# Patient Record
Sex: FEMALE | Race: WHITE | NOT HISPANIC OR LATINO | Employment: OTHER | URBAN - NONMETROPOLITAN AREA
[De-identification: names, ages, dates, MRNs, and addresses within clinical notes are randomized per-mention and may not be internally consistent; named-entity substitution may affect disease eponyms.]

---

## 2017-05-18 ENCOUNTER — TRANSCRIBE ORDERS (OUTPATIENT)
Dept: ADMINISTRATIVE | Facility: HOSPITAL | Age: 77
End: 2017-05-18

## 2017-05-18 DIAGNOSIS — Z12.31 VISIT FOR SCREENING MAMMOGRAM: Primary | ICD-10-CM

## 2017-05-31 ENCOUNTER — HOSPITAL ENCOUNTER (OUTPATIENT)
Dept: MAMMOGRAPHY | Facility: HOSPITAL | Age: 77
Discharge: HOME | End: 2017-05-31
Admitting: INTERNAL MEDICINE

## 2017-05-31 DIAGNOSIS — Z12.31 VISIT FOR SCREENING MAMMOGRAM: ICD-10-CM

## 2017-05-31 PROCEDURE — 77063 BREAST TOMOSYNTHESIS BI: CPT | Performed by: RADIOLOGY

## 2017-05-31 PROCEDURE — G0202 SCR MAMMO BI INCL CAD: HCPCS

## 2017-05-31 PROCEDURE — 77063 BREAST TOMOSYNTHESIS BI: CPT

## 2017-05-31 PROCEDURE — G0202 SCR MAMMO BI INCL CAD: HCPCS | Performed by: RADIOLOGY

## 2017-09-26 ENCOUNTER — OFFICE VISIT (OUTPATIENT)
Dept: NEUROSURGERY | Facility: CLINIC | Age: 77
End: 2017-09-26

## 2017-09-26 VITALS
HEIGHT: 67 IN | TEMPERATURE: 97.7 F | BODY MASS INDEX: 33.43 KG/M2 | SYSTOLIC BLOOD PRESSURE: 140 MMHG | DIASTOLIC BLOOD PRESSURE: 66 MMHG | WEIGHT: 213 LBS

## 2017-09-26 DIAGNOSIS — M51.36 BULGING LUMBAR DISC: ICD-10-CM

## 2017-09-26 DIAGNOSIS — M51.36 DEGENERATIVE DISC DISEASE, LUMBAR: Primary | ICD-10-CM

## 2017-09-26 DIAGNOSIS — M79.605 BILATERAL LEG PAIN: ICD-10-CM

## 2017-09-26 DIAGNOSIS — M79.604 BILATERAL LEG PAIN: ICD-10-CM

## 2017-09-26 DIAGNOSIS — M43.16 SPONDYLOLISTHESIS OF LUMBAR REGION: ICD-10-CM

## 2017-09-26 PROCEDURE — 99213 OFFICE O/P EST LOW 20 MIN: CPT | Performed by: NEUROLOGICAL SURGERY

## 2017-09-26 RX ORDER — BIOTIN 5 MG
CAPSULE ORAL
COMMUNITY
End: 2019-08-06

## 2017-09-26 RX ORDER — FLUTICASONE PROPIONATE 50 MCG
2 SPRAY, SUSPENSION (ML) NASAL AS NEEDED
COMMUNITY

## 2017-09-26 RX ORDER — AMLODIPINE BESYLATE 10 MG/1
10 TABLET ORAL DAILY
Status: ON HOLD | COMMUNITY
End: 2019-08-20

## 2017-09-26 RX ORDER — ASPIRIN 81 MG/1
81 TABLET ORAL DAILY
COMMUNITY
End: 2019-08-23 | Stop reason: HOSPADM

## 2017-09-26 RX ORDER — MULTIPLE VITAMINS W/ MINERALS TAB 9MG-400MCG
1 TAB ORAL DAILY
COMMUNITY

## 2017-09-26 RX ORDER — LISINOPRIL 40 MG/1
40 TABLET ORAL DAILY
COMMUNITY
End: 2018-09-14

## 2017-09-26 RX ORDER — KRILL/OM-3/DHA/EPA/PHOSPHO/AST 500MG-86MG
1 CAPSULE ORAL DAILY
COMMUNITY

## 2017-09-26 RX ORDER — QUINAPRIL HCL AND HYDROCHLOROTHIAZIDE 20; 25 MG/1; MG/1
1 TABLET ORAL DAILY
COMMUNITY
Start: 2017-09-18 | End: 2019-10-30

## 2017-09-26 RX ORDER — LEVOTHYROXINE SODIUM 112 UG/1
112 TABLET ORAL DAILY
COMMUNITY

## 2017-09-26 RX ORDER — ATORVASTATIN CALCIUM 20 MG/1
10 TABLET, FILM COATED ORAL 2 TIMES WEEKLY
COMMUNITY
Start: 2017-09-18 | End: 2020-08-10 | Stop reason: SDUPTHER

## 2017-09-26 RX ORDER — ATORVASTATIN CALCIUM 40 MG/1
40 TABLET, FILM COATED ORAL DAILY
COMMUNITY
End: 2018-07-31

## 2017-09-26 RX ORDER — AMLODIPINE BESYLATE 5 MG/1
TABLET ORAL
COMMUNITY
Start: 2017-09-18 | End: 2018-07-31

## 2017-09-26 NOTE — PROGRESS NOTES
Jewels Venegas  1940  [id number]      Chief Complaint   Patient presents with   • Back Pain   • Numbness   • Tingling       HISTORY OF PRESENT ILLNESS:  [ This is a 77-year-old female who underwent multilevel laminectomies L2-L4 and 2015 for neurogenic claudication and has done exceedingly well since that time with resolution of her symptoms.  In the past few months however she is developed pain in her mid back which occasionally radiates into her right leg.  She notes that she has some numbness in her right leg in a questionable weakness in her hip flexion and quadriceps on the right.  Furthermore she notes that when she raises her arms above her head she has intense back pain.  She has no bowel or bladder dysfunction.  That evening and standing make matters worse.  It has increased over the past several months.  Lumbar MRIs been performed and she is referred for reevaluation.]    Past Medical History:   Diagnosis Date   • Arthritis    • Back problem    • Hypertension    • Osteoporosis    • Thyroid disease        Past Surgical History:   Procedure Laterality Date   • APPENDECTOMY  1977   • CHOLECYSTECTOMY     • LUMBAR LAMINECTOMY  06/2015    Dr. Domenico Reid, [facility]       Family History   Problem Relation Age of Onset   • Cancer Father    • Heart disease Father        Social History     Social History   • Marital status:      Spouse name: N/A   • Number of children: N/A   • Years of education: N/A     Occupational History   • Not on file.     Social History Main Topics   • Smoking status: Former Smoker     Quit date: 1982   • Smokeless tobacco: Never Used   • Alcohol use No   • Drug use: No   • Sexual activity: Defer     Other Topics Concern   • Not on file     Social History Narrative       Allergies   Allergen Reactions   • Codeine Itching         Current Outpatient Prescriptions:   •  amLODIPine (NORVASC) 10 MG tablet, Take 10 mg by mouth Daily., Disp: , Rfl:   •  aspirin 81 MG EC  tablet, Take 81 mg by mouth Daily., Disp: , Rfl:   •  atorvastatin (LIPITOR) 40 MG tablet, Take 40 mg by mouth Daily., Disp: , Rfl:   •  Biotin (BIOTIN 5000) 5 MG capsule, Take  by mouth., Disp: , Rfl:   •  Coenzyme Q10 (CO Q 10) 100 MG capsule, Take  by mouth., Disp: , Rfl:   •  Cyanocobalamin (B-12-SL SL), Place  under the tongue Daily., Disp: , Rfl:   •  fluticasone (FLONASE) 50 MCG/ACT nasal spray, 2 sprays into each nostril As Needed for Rhinitis., Disp: , Rfl:   •  Krill Oil 500 MG capsule, Take  by mouth., Disp: , Rfl:   •  levothyroxine (SYNTHROID, LEVOTHROID) 112 MCG tablet, Take 112 mcg by mouth Daily., Disp: , Rfl:   •  lisinopril (PRINIVIL,ZESTRIL) 40 MG tablet, Take 40 mg by mouth Daily., Disp: , Rfl:   •  Multiple Vitamins-Minerals (MULTIVITAMIN WITH MINERALS) tablet tablet, Take 1 tablet by mouth Daily., Disp: , Rfl:   •  vitamin D (ERGOCALCIFEROL) 52731 units capsule capsule, Take 50,000 Units by mouth 1 (One) Time Per Week., Disp: , Rfl:     Review of Systems   Constitutional: Negative for activity change, appetite change, chills, diaphoresis, fatigue, fever and unexpected weight change.   HENT: Negative for congestion, dental problem, drooling, ear discharge, ear pain, facial swelling, hearing loss, mouth sores, nosebleeds, postnasal drip, rhinorrhea, sinus pressure, sneezing, sore throat, tinnitus, trouble swallowing and voice change.    Eyes: Negative for photophobia, pain, discharge, redness, itching and visual disturbance.   Respiratory: Negative for apnea, cough, choking, chest tightness, shortness of breath, wheezing and stridor.    Cardiovascular: Negative for chest pain, palpitations and leg swelling.   Gastrointestinal: Negative for abdominal distention, abdominal pain, anal bleeding, blood in stool, constipation, diarrhea, nausea, rectal pain and vomiting.   Endocrine: Negative for cold intolerance, heat intolerance, polydipsia, polyphagia and polyuria.   Genitourinary: Negative for  "decreased urine volume, difficulty urinating, dysuria, enuresis, flank pain, frequency, genital sores, hematuria and urgency.   Musculoskeletal: Positive for back pain. Negative for arthralgias, gait problem, joint swelling, myalgias, neck pain and neck stiffness.   Skin: Negative for color change, pallor, rash and wound.   Allergic/Immunologic: Negative for environmental allergies, food allergies and immunocompromised state.   Neurological: Positive for numbness. Negative for dizziness, tremors, seizures, syncope, facial asymmetry, speech difficulty, weakness, light-headedness and headaches.   Hematological: Negative for adenopathy. Does not bruise/bleed easily.   Psychiatric/Behavioral: Negative for agitation, behavioral problems, confusion, decreased concentration, dysphoric mood, hallucinations, self-injury, sleep disturbance and suicidal ideas. The patient is not nervous/anxious and is not hyperactive.        Vitals:    09/26/17 1528   BP: 140/66   BP Location: Right arm   Patient Position: Sitting   Temp: 97.7 °F (36.5 °C)   TempSrc: Temporal Artery    Weight: 213 lb (96.6 kg)   Height: 66.5\" (168.9 cm)       Neurological Examination:    Mental status/speech: The patient is alert and oriented.  Speech is clear without aphysia or dysarthria.  No overt cognitive deficits.    Cranial nerve examination:    Olfaction: Smell is intact.  Vision: Vision is intact without visual field abnormalities.  Funduscopic examination is normal.  No pupillary irregularity.  Ocular motor examination: The extraocular muscles are intact.  There is no diplopia.  The pupil is round and reactive to both light and accommodation.  There is no nystagmus.  Facial movement/sensation: There is no facial weakness.  Sensation is intact in the first, second, and third divisions of the trigeminal nerve.  The corneal reflex is intact.  Auditory: Hearing is intact to finger rub bilaterally.  Cranial nerves IX, X, XI, XII: Phonation is normal.  No " dysphagia.  Tongue is protruded in the midline without atrophy.  The gag reflex is intact.  Shoulder shrug is normal.    Musculoligamentous ligamentous examination: She has limitation range of motion.  She is slightly weaker in her hip flexion and quadriceps on the right side.  The deep tendon reflexes are hypoactive.  Straight leg raising is negative.  He is normal otherwise.    Medical Decision Making:     Diagnostic Data Set:  Lumbar MRI shows the laminectomy site.  There appears to be a disc protrusion L3-L4 to the right fifth beneath the facet complex.  She has marked disc space narrowing with listhesis and a variety of levels.  There are no Modic changes, however.      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  I will review those studies with her MRI        I greatly appreciate the opportunity to see and evaluate this individual.  If you have questions or concerns regarding issues that I may have overlooked please call me at any time: [phone].  Chucky Reid M.D.  [facility]  [address] [place] [address]    Scribed for Domenico Reid MD by Jocelyn Lozada CMA. 9/26/2017  4:03 PM     I have read and concur with the information provided by the scribe.  Domenico Reid MD

## 2017-10-04 ENCOUNTER — APPOINTMENT (OUTPATIENT)
Dept: NEUROLOGY | Facility: HOSPITAL | Age: 77
End: 2017-10-04
Attending: NEUROLOGICAL SURGERY

## 2017-10-04 ENCOUNTER — HOSPITAL ENCOUNTER (OUTPATIENT)
Dept: GENERAL RADIOLOGY | Facility: HOSPITAL | Age: 77
Discharge: HOME | End: 2017-10-04

## 2017-10-04 ENCOUNTER — HOSPITAL ENCOUNTER (OUTPATIENT)
Dept: CT IMAGING | Facility: HOSPITAL | Age: 77
Discharge: HOME | End: 2017-10-04

## 2017-10-04 ENCOUNTER — HOSPITAL ENCOUNTER (OUTPATIENT)
Dept: NEUROLOGY | Facility: HOSPITAL | Age: 77
Discharge: HOME | End: 2017-10-04
Attending: NEUROLOGICAL SURGERY

## 2017-10-04 ENCOUNTER — APPOINTMENT (OUTPATIENT)
Dept: GENERAL RADIOLOGY | Facility: HOSPITAL | Age: 77
End: 2017-10-04
Attending: NEUROLOGICAL SURGERY

## 2017-10-04 ENCOUNTER — HOSPITAL ENCOUNTER (OUTPATIENT)
Dept: GENERAL RADIOLOGY | Facility: HOSPITAL | Age: 77
Discharge: HOME | End: 2017-10-04
Attending: NEUROLOGICAL SURGERY | Admitting: NEUROLOGICAL SURGERY

## 2017-10-04 VITALS
WEIGHT: 212.96 LBS | RESPIRATION RATE: 16 BRPM | SYSTOLIC BLOOD PRESSURE: 177 MMHG | TEMPERATURE: 97.5 F | DIASTOLIC BLOOD PRESSURE: 73 MMHG | BODY MASS INDEX: 28.85 KG/M2 | HEIGHT: 72 IN | HEART RATE: 87 BPM | OXYGEN SATURATION: 97 %

## 2017-10-04 PROCEDURE — 72265 MYELOGRAPHY L-S SPINE: CPT

## 2017-10-04 PROCEDURE — 63710000001 DIPHENHYDRAMINE PER 50 MG: Performed by: NEUROLOGICAL SURGERY

## 2017-10-04 PROCEDURE — 0 IOPAMIDOL 41 % SOLUTION: Performed by: NEUROLOGICAL SURGERY

## 2017-10-04 PROCEDURE — A9270 NON-COVERED ITEM OR SERVICE: HCPCS | Performed by: NEUROLOGICAL SURGERY

## 2017-10-04 PROCEDURE — 72131 CT LUMBAR SPINE W/O DYE: CPT

## 2017-10-04 PROCEDURE — 63710000001 DIAZEPAM 5 MG TABLET: Performed by: NEUROLOGICAL SURGERY

## 2017-10-04 PROCEDURE — 95886 MUSC TEST DONE W/N TEST COMP: CPT

## 2017-10-04 PROCEDURE — 72120 X-RAY BEND ONLY L-S SPINE: CPT

## 2017-10-04 PROCEDURE — 95910 NRV CNDJ TEST 7-8 STUDIES: CPT

## 2017-10-04 RX ORDER — LIDOCAINE HYDROCHLORIDE 10 MG/ML
10 INJECTION, SOLUTION INFILTRATION; PERINEURAL ONCE
Status: COMPLETED | OUTPATIENT
Start: 2017-10-04 | End: 2017-10-04

## 2017-10-04 RX ORDER — DIPHENHYDRAMINE HCL 50 MG
50 CAPSULE ORAL ONCE
Status: COMPLETED | OUTPATIENT
Start: 2017-10-04 | End: 2017-10-04

## 2017-10-04 RX ORDER — DIAZEPAM 5 MG/1
10 TABLET ORAL ONCE
Status: COMPLETED | OUTPATIENT
Start: 2017-10-04 | End: 2017-10-04

## 2017-10-04 RX ADMIN — DIPHENHYDRAMINE HYDROCHLORIDE 50 MG: 50 CAPSULE ORAL at 08:29

## 2017-10-04 RX ADMIN — DIAZEPAM 10 MG: 5 TABLET ORAL at 08:28

## 2017-10-04 RX ADMIN — LIDOCAINE HYDROCHLORIDE 10 ML: 10 INJECTION, SOLUTION INFILTRATION; PERINEURAL at 10:05

## 2017-10-04 RX ADMIN — IOPAMIDOL 15 ML: 408 INJECTION, SOLUTION INTRATHECAL at 10:05

## 2017-10-04 NOTE — PLAN OF CARE
Problem: Patient Care Overview (Adult)  Goal: Discharge Needs Assessment  Outcome: Ongoing (interventions implemented as appropriate)    10/04/17 0900   Discharge Needs Assessment   Concerns To Be Addressed no discharge needs identified   Readmission Within The Last 30 Days no previous admission in last 30 days   Equipment Needed After Discharge none   Discharge Disposition home or self-care   Current Health   Anticipated Changes Related to Illness none   Self-Care   Equipment Currently Used at Home none   Living Environment   Transportation Available family or friend will provide

## 2017-10-04 NOTE — PLAN OF CARE
Problem: Patient Care Overview (Adult)  Goal: Plan of Care Review  Outcome: Ongoing (interventions implemented as appropriate)    10/04/17 0900   Coping/Psychosocial Response Interventions   Plan Of Care Reviewed With patient;family   Patient Care Overview   Progress no change

## 2017-10-04 NOTE — POST-PROCEDURE NOTE
Radiology Procedure    Pre-procedure: procedure, risks discussed with patient. Patient understood and consented to procedure     Procedure Performed: lumbar myelogram     IV Sedation and/or Anesthesia:  No    Complications: none    Preliminary Findings: pending    Specimen Removed: none    Estimated Blood Loss:  0ml    Post-Procedure Diagnosis: pending    Post-Procedure Plan: ct L spine, encourage fluids, bed rest x 2 hours    Standard Discharge Instructions Given:yes     KATHI Danielle  10/04/17  10:01 AM

## 2017-10-04 NOTE — PLAN OF CARE
Problem: Myelogram (Adult)  Goal: Signs and Symptoms of Listed Potential Problems Will be Absent or Manageable (Myelogram)  Outcome: Ongoing (interventions implemented as appropriate)    10/04/17 0900   Myelogram   Problems Assessed (Myelogram) all   Problems Present (Myelogram) pain;situational response

## 2017-10-05 ENCOUNTER — TELEPHONE (OUTPATIENT)
Dept: INTERVENTIONAL RADIOLOGY/VASCULAR | Facility: HOSPITAL | Age: 77
End: 2017-10-05

## 2017-10-20 ENCOUNTER — TELEPHONE (OUTPATIENT)
Dept: NEUROSURGERY | Facility: CLINIC | Age: 77
End: 2017-10-20

## 2017-10-20 DIAGNOSIS — M43.16 SPONDYLOLISTHESIS OF LUMBAR REGION: ICD-10-CM

## 2017-10-20 DIAGNOSIS — M51.36 DDD (DEGENERATIVE DISC DISEASE), LUMBAR: Primary | ICD-10-CM

## 2017-11-20 ENCOUNTER — DOCUMENTATION (OUTPATIENT)
Dept: NEUROSURGERY | Facility: CLINIC | Age: 77
End: 2017-11-20

## 2017-11-20 NOTE — PROGRESS NOTES
[facility] called to request a refill on Tramadol for patient.  I called the patient to confirm this and she said that Dr. Reid made her an apt for this Wed to see a pain specialist.  I told her that that would have to take care of her pain management since she has already been referred.  She was thankful for the call and hoped it didn't create any issues.  I reassured her it did not.

## 2017-11-27 NOTE — TELEPHONE ENCOUNTER
Provider:  [place]  Pharmacy: [facility]  Last visit:   09/26/17  Next visit: none scheduled    KIKI:  (only 1 result on kiki)  10/05/2017 Tramadol 50mg 1940 60 15 Domenico Reid    Reason for call:       Automated refill request from patient's pharmacy    (I have the form for the Tramadol that will need to be signed and faxed to [employer] if approved)

## 2017-11-28 RX ORDER — METHOCARBAMOL 750 MG/1
750 TABLET, FILM COATED ORAL 2 TIMES DAILY
Qty: 60 TABLET | Refills: 1 | Status: SHIPPED | OUTPATIENT
Start: 2017-11-28 | End: 2018-01-10 | Stop reason: SDUPTHER

## 2017-11-28 RX ORDER — TRAMADOL HYDROCHLORIDE 50 MG/1
50 TABLET ORAL EVERY 6 HOURS PRN
Qty: 60 TABLET
Start: 2017-11-28 | End: 2019-08-23 | Stop reason: HOSPADM

## 2018-01-10 RX ORDER — METHOCARBAMOL 750 MG/1
TABLET, FILM COATED ORAL
Qty: 120 TABLET | Refills: 1 | Status: SHIPPED | OUTPATIENT
Start: 2018-01-10 | End: 2019-03-12 | Stop reason: SDUPTHER

## 2018-01-10 NOTE — TELEPHONE ENCOUNTER
Provider: Franklin   Caller: pharmacy  Time of call:     Phone #:  [phone]  Surgery: n/a   Surgery Date:    Last visit: 9/26/17    Next visit: n/a    KIKI:         Reason for call:       Refill request

## 2018-01-12 ENCOUNTER — DOCUMENTATION (OUTPATIENT)
Dept: NEUROSURGERY | Facility: CLINIC | Age: 78
End: 2018-01-12

## 2018-01-12 NOTE — PROGRESS NOTES
Patient notified that her medication has been approved.  I will fax approval letter to [facility].

## 2018-06-04 ENCOUNTER — HOSPITAL ENCOUNTER (OUTPATIENT)
Dept: MAMMOGRAPHY | Facility: HOSPITAL | Age: 78
Discharge: HOME | End: 2018-06-04
Admitting: INTERNAL MEDICINE

## 2018-06-04 DIAGNOSIS — Z12.39 SCREENING BREAST EXAMINATION: ICD-10-CM

## 2018-06-04 PROCEDURE — 77067 SCR MAMMO BI INCL CAD: CPT | Performed by: RADIOLOGY

## 2018-06-04 PROCEDURE — 77067 SCR MAMMO BI INCL CAD: CPT

## 2018-06-04 PROCEDURE — 77063 BREAST TOMOSYNTHESIS BI: CPT | Performed by: RADIOLOGY

## 2018-06-04 PROCEDURE — 77063 BREAST TOMOSYNTHESIS BI: CPT

## 2018-06-14 ENCOUNTER — TELEPHONE (OUTPATIENT)
Dept: NEUROSURGERY | Facility: CLINIC | Age: 78
End: 2018-06-14

## 2018-06-14 DIAGNOSIS — M43.16 SPONDYLOLISTHESIS OF LUMBAR REGION: Primary | ICD-10-CM

## 2018-06-14 DIAGNOSIS — M51.36 DDD (DEGENERATIVE DISC DISEASE), LUMBAR: ICD-10-CM

## 2018-06-14 NOTE — TELEPHONE ENCOUNTER
Provider:  [place]  Caller: Jewels Venegas  Time of call:   02:04 PM  Phone #:  [phone]  Last visit:   09/26/17  Next visit: PRN    Reason for call:       Pt has been seeing Dr. Haley for pain management, whom Dr. Reid referred her to, said that she was getting ready to do the SCS trial with him, but now his practice doesn't take her insurance anymore. She wants to know if Dr. Reid will give her a new referral to pain management.    I went ahead and pended a new referral

## 2018-07-18 ENCOUNTER — TELEPHONE (OUTPATIENT)
Dept: PAIN MEDICINE | Facility: CLINIC | Age: 78
End: 2018-07-18

## 2018-07-18 NOTE — TELEPHONE ENCOUNTER
[facility] #[id number]  EMG/NCV of both lower extremities: Sensorimotor neuropathy, axonal, moderate. Chronic polyradiculopathy involving L3/L4/L5 elements bilaterally     CT LUMBAR SPINE WO CONTRAST- 10/04/2017: Bony findings demonstrate minimal retrolisthesis of L2 on L3. There is a minimal dextroscoliotic curvature of the lumbar spine apex L4 with slight rotatory component at the L4-L5 juncture. Multilevel osteophyte formation with bridging anteriorly of the L2-L3, L3-L4, L4-L5 and L5-S1 levels. Facets are well aligned with facet hypertrophy noted in the lower lumbar segments. No evidence for acute fracture with  preservation of vertebral body heights. Intervertebral disc space heights are decreased at essentially all levels but most significant at the L2-L3, L3-L4, L4-L5 and L5-S1 levels where there is disc desiccation and associated degenerative change of the surrounding endplates. Axial datasets with level by level evaluation as follows:  L1-L2: Severely limited evaluation due to poor thecal sac opacification. No gross thecal sac contour irregularity.  L2-L3: Disc osteophyte complex with associated circumferential disc bulge with concomitant facet hypertrophy producing moderate to severe thecal sac effacement with moderate bilateral nerve root sleeve effacement.L3-L4: Disc osteophyte complex with associated disc bulge with left lateralizing component involving the left subarticular recess and left neural foramina producing moderate thecal sac effacement with moderate to severe left nerve root sleeve effacement.  L4-L5: Disc osteophyte complex with disc bulge containing far right lateral and right paracentral components producing mild to moderate thecal sac effacement with mild left and total right nerve root sleeve effacement.  L5-S1: Disc osteophyte complex with circumferential disc bulge component producing mild thecal sac effacement with moderate right nerve root sleeve effacement.    IR MYELOGRAM LUMBAR  SPINE-, XR SPINE LUMBAR FLEX AND EXT: There are postoperative changes that are consistent with a laminectomy at the L2-L5 levels. There is moderate diffuse arthropathy of the lumbar spine with disc space narrowing, endplate disease, and osteophyte formation. There is a grade 1 retrolisthesis of L2 on L3 which measures approximate 10 mm in the flexed, and extended positions. There was good filling of the thecal sac. No intrathecal mass was identified. The nerve root sleeves appear bilaterally symmetric. There is mild anterior impression on thecal sac at the L2-L3, L3-L4, and L4-L5 levels. Please correlate with CT imaging.        MRI of the lumbar spine July 31, 2017 revealed retrolisthesis of L2 on L3.  Grade 1 anterior spondylolisthesis of L5 on S1.  Vertebral body height is preserved.  There is multilevel disc desiccation with disc space narrowing.  There is normal signal intensity in the cervical cord.  The spinal cord ends at the level of L1-L2.  Disc levels:  L1-L2: Mild annular disc bulge with no central or significant neuroforaminal stenosis  L2/L3: Broad-based disc osteophyte complex.  No central canal stenosis.  Moderate bilateral neuroforaminal stenosis.  L3-L4: Right paracentral protrusion.  Disc material a sense along the anterior aspect of the central canal and along the posterior aspect of the inferior endplate of L3.  This is superimposed on this osteophyte complex.  There is no central stenosis.  There is severe bilateral neuroforaminal stenosis.  L4-L5: Broad-based disc osteophyte complex.  There is no central canal stenosis.  Bilateral neuroforaminal stenosis.  L5-S1: Disc osteophyte complex.  There is no central canal stenosis.  Moderate to severe right and mild left neuroforaminal stenosis.

## 2018-07-27 PROBLEM — M47.9 SPONDYLARTHROSIS: Status: ACTIVE | Noted: 2018-07-27

## 2018-07-27 PROBLEM — M81.0 OSTEOPOROSIS: Status: ACTIVE | Noted: 2018-07-27

## 2018-07-27 PROBLEM — G62.9 POLYNEUROPATHY: Status: ACTIVE | Noted: 2018-07-27

## 2018-07-27 PROBLEM — M48.062 LUMBAR STENOSIS WITH NEUROGENIC CLAUDICATION: Status: ACTIVE | Noted: 2018-07-27

## 2018-07-27 PROBLEM — M96.1 POSTLAMINECTOMY SYNDROME OF LUMBAR REGION: Status: ACTIVE | Noted: 2018-07-27

## 2018-07-27 PROBLEM — Z98.890 HISTORY OF LUMBAR LAMINECTOMY: Status: ACTIVE | Noted: 2018-07-27

## 2018-07-27 NOTE — PROGRESS NOTES
"Chief Complaint: \"Pain in my lower back and legs (worse on the right side).\"     History of Present Illness:   Patient: Ms. Jewels Venegas, 77 y.o. female   Referring physician: Padmaja Brown PA-C   Reason for referral: Consultation for intractable chronic lower back pain.   Pain history: Patient reports a 6-year history of pain, which began without incident. Patient underwent multilevel laminectomies L2-L4 in 2015 with Dr. Reid for neurogenic claudication and did exceedingly well with resolution of her symptoms until about 2016. Pain has progressed in intensity over the past 1 year.   Pain description: constant lower back pain with intermittent exacerbation, described as crushing, sharp, pressure, and stabbing sensation.   Radiation of pain: The lower back pain radiates into the lateral and anterior aspect of the thighs, the lateral aspect of the shins, and into the dorsum of her feet  Pain intensity today: 3/10 (sitting) standing or walking (6/10)  Average pain intensity last week: 4/10  Pain intensity ranges from: 1/10 to 9/10  Aggravating factors: Pain increases with standing longer than 1 minutes and ambulating with her cane or walker more than 5-10 minutes.  Alleviating factors: Pain decreases with sitting, lying down, heat and analgesics.     Associated symptoms:   Patient reports pain, numbness and weakness in the lower extremities, worse on the right side.  Patient denies any new bladder or bowel problems. Patient has a history of chronic bladder incontinence.   Patient reports difficulties with her balance but denies recent falls.     Review of previous therapies and additional medical records:  Jewels Venegas has already failed the following measures, including:   Conservative measures: oral analgesics, topical analgesics, physical therapy, ice and heat   Interventional measures: Patient underwent three Racz procedures with , her last procedure was in April. She reports no long-lasting relief from those " procedures.  Surgical measures: Patient underwent L2-L4 laminectomies and medial factectomies with  on 06/24/2015. Patient experienced 100% pain relief from her surgery for over 1 year.   Jewels Venegas underwent follow-up neurosurgical consultation with Dr. Reid on 09/26/2017, and was found not to be a surgical candidate.  Patient underwent psychological evaluation with Dr. Coral melendez and has been found to be an appropriate candidate for a spinal cord stimulation  Jewels Venegas presents with significant comorbidities including insomnia, not engaged in treatment, hypertension, osteoporosis engaged in treatment.  In terms of current analgesics, Jewels Venegas takes: Robaxin, tramadol   I have reviewed [facility] Report #[id number] consistent to medication reconciliation.     Global Pain Scale  07-31-18                 Pain  14                 Feelings  3                 Clinical outcomes  11                 Activities  13                 GPS Total:  41                    Review of Diagnostic Studies:    EMG/NCV of both lower extremities 10/04/2017: Sensorimotor neuropathy, axonal, moderate. 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 There is a minimal dextroscoliotic curvature of the lumbar spine apex L4 with slight rotatory component at the L4-L5 juncture. Multilevel osteophyte formation with bridging anteriorly of the L2-L3, L3-L4, L4-L5 and L5-S1 levels. Facets are well aligned with facet hypertrophy noted in the lower lumbar segments. Preservation of vertebral body heights. Intervertebral disc space heights are decreased at essentially all levels but most significant at the L2-L3, L3-L4, L4-L5 and L5-S1 levels where there is disc desiccation and associated degenerative change of the surrounding endplates. Axial datasets with level by level evaluation as follows:  L1-L2: Severely limited evaluation due to poor  thecal sac opacification. No gross thecal sac contour irregularity.  L2-L3: Disc osteophyte complex with associated circumferential disc bulge with concomitant facet hypertrophy producing moderate to severe thecal sac effacement with moderate bilateral nerve root sleeve effacement.  L3-L4: Disc osteophyte complex with associated disc bulge with left lateralizing component involving the left subarticular recess and left neural foramina producing moderate thecal sac effacement with moderate to severe left nerve root sleeve effacement.  L4-L5: Disc osteophyte complex with disc bulge containing far right lateral and right paracentral components producing mild to moderate thecal sac effacement with mild left and total right nerve root sleeve effacement.  L5-S1: Disc osteophyte complex with circumferential disc bulge component producing mild thecal sac effacement with moderate right nerve root sleeve effacement.  IR MYELOGRAM LUMBAR SPINE-, XR SPINE LUMBAR FLEX AND EXT: There are postoperative changes consistent with a laminectomy at the L2-L5 levels. Moderate diffuse arthropathy of the lumbar spine with disc space narrowing, endplate disease, and osteophyte formation. There is a grade 1 retrolisthesis of L2 on L3 which measures approximate 10 mm in the flexed, and extended positions. There was good filling of the thecal sac. No intrathecal mass was identified. The nerve root sleeves appear bilaterally symmetric. There is mild anterior impression on thecal sac at the L2-L3, L3-L4, and L4-L5 levels. Please correlate with CT imaging.      MRI of the lumbar spine July 31, 2017 revealed retrolisthesis of L2 on L3. Grade 1 anterior spondylolisthesis of L5 on S1.  Vertebral body height is preserved. There is multilevel disc desiccation with disc space narrowing. There is normal signal intensity in the cervical cord.  The spinal cord ends at the level of L1-L2.  Disc levels:  L1-L2: Mild annular disc bulge with no central or  significant neuroforaminal stenosis  L2-L3: Broad-based disc osteophyte complex.  No central canal stenosis. Moderate bilateral neuroforaminal stenosis.  L3-L4: Right paracentral protrusion.  Disc material a sense along the anterior aspect of the central canal and along the posterior aspect of the inferior endplate of L3.  This is superimposed on this osteophyte complex.  There is no central stenosis.  There is severe bilateral neuroforaminal stenosis.  L4-L5: Broad-based disc osteophyte complex.  There is no central canal stenosis.  Bilateral neuroforaminal stenosis.  L5-S1: Disc osteophyte complex.  There is no central canal stenosis. Moderate to severe right and mild left neuroforaminal stenosis.    Review of Systems   Genitourinary: Positive for frequency and urgency.   Musculoskeletal: Positive for arthralgias, back pain, joint swelling and myalgias.   Allergic/Immunologic: Positive for environmental allergies.   Neurological: Positive for weakness and numbness.   All other systems reviewed and are negative.        Patient Active Problem List   Diagnosis   • Postlaminectomy syndrome of lumbar region   • History of lumbar laminectomy   • Osteoporosis   • Lumbar stenosis with neurogenic claudication   • Polyneuropathy   • Spondylarthrosis   • Benign paroxysmal positional vertigo   • Insomnia due to medical condition   • At high risk for falls   • Physical deconditioning       Past Medical History:   Diagnosis Date   • Arthritis    • Back problem    • Hypertension    • Osteoporosis    • Thyroid disease          Past Surgical History:   Procedure Laterality Date   • APPENDECTOMY  1977   • CHOLECYSTECTOMY     • LUMBAR LAMINECTOMY  06/2015    Dr. Domenico Reid, [facility]         Family History   Problem Relation Age of Onset   • Cancer Father    • Heart disease Father    • Breast cancer Neg Hx          Social History     Social History   • Marital status:      Social History Main Topics   •  "Smoking status: Former Smoker     Quit date: 1982   • Smokeless tobacco: Never Used   • Alcohol use No   • Drug use: No   • Sexual activity: Defer     Other Topics Concern   • Not on file           Current Outpatient Prescriptions:   •  amLODIPine (NORVASC) 10 MG tablet, Take 10 mg by mouth Daily., Disp: , Rfl:   •  aspirin 81 MG EC tablet, Take 81 mg by mouth Daily., Disp: , Rfl:   •  atorvastatin (LIPITOR) 20 MG tablet, , Disp: , Rfl:   •  Biotin (BIOTIN 5000) 5 MG capsule, Take  by mouth., Disp: , Rfl:   •  Coenzyme Q10 (CO Q 10) 100 MG capsule, Take  by mouth., Disp: , Rfl:   •  Cyanocobalamin (B-12-SL SL), Place  under the tongue Daily., Disp: , Rfl:   •  fluticasone (FLONASE) 50 MCG/ACT nasal spray, 2 sprays into each nostril As Needed for Rhinitis., Disp: , Rfl:   •  Krill Oil 500 MG capsule, Take  by mouth., Disp: , Rfl:   •  levothyroxine (SYNTHROID, LEVOTHROID) 112 MCG tablet, Take 112 mcg by mouth Daily., Disp: , Rfl:   •  lisinopril (PRINIVIL,ZESTRIL) 40 MG tablet, Take 40 mg by mouth Daily., Disp: , Rfl:   •  methocarbamol (ROBAXIN) 750 MG tablet, TAKE 1 TABLET TWICE DAILY, Disp: 120 tablet, Rfl: 1  •  Multiple Vitamins-Minerals (MULTIVITAMIN WITH MINERALS) tablet tablet, Take 1 tablet by mouth Daily., Disp: , Rfl:   •  quinapril-hydrochlorothiazide (ACCURETIC) 20-25 MG per tablet, , Disp: , Rfl:   •  traMADol (ULTRAM) 50 MG tablet, Take 1 tablet by mouth Every 6 (Six) Hours As Needed for Moderate Pain ., Disp: 60 tablet, Rfl:   •  vitamin D (ERGOCALCIFEROL) 15443 units capsule capsule, Take 50,000 Units by mouth 1 (One) Time Per Week., Disp: , Rfl:       Allergies   Allergen Reactions   • Codeine Itching       /80   Pulse 93   Temp 98.3 °F (36.8 °C) (Temporal Artery )   Resp 18   Ht 195.6 cm (77\")   Wt 92.9 kg (204 lb 12.8 oz)   SpO2 99%   BMI 24.29 kg/m²       Physical Exam:  Constitutional: Patient is oriented to person, place, and time. Vital signs are normal. Patient appears " well-developed and well-nourished.   HEENT: Head: Normocephalic and atraumatic. Eyes: Conjunctivae and lids are normal. Pupils: Equal, round, reactive to light.   Neck: Trachea normal. Neck supple. No JVD present.   Pulmonary Respiratory effort: No increased work of breathing or signs of respiratory distress. Auscultation of lungs: Clear to auscultation.   Cardiovascular Auscultation of heart: Normal rate and rhythm, normal S1 and S2, no murmurs.   Peripheral vascular exam: Femoral: right 2+, left 2+. Posterior tibialis: right 2+ and left 2+. Dorsalis pedis: right 2+ and left 2+. No edema.   Abdomen: The abdomen was soft and nontender. Bowel sounds were normal.   Musculoskeletal   Gait and station: Gait evaluation demonstrated shuffling   Lumbar spine: The range of motion of the lumbar spine is limited secondary to pain. Extension of the lumbar spine increased and reproduced pain. Lumbar facet joint loading maneuvers are positive.  Sean and Gaenslen's tests are negative   Piriformis maneuvers are negative   Palpation of the bilateral ischial tuberosities, unrevealing   Palpation of the bilateral greater trochanter, unrevealing   Examination of the Iliotibial band: unrevealing   Hip joints: The range of motion of the hip joints is full and without pain   Neurological: Patient is alert and oriented to person, place, and time. Speech: speech is normal. Cortical function: Normal mental status.   Cranial nerves: Cranial nerves 2-12 intact.   Reflex Scores:  Right patellar: 1+  Left patellar: 1+  Right achilles: 1+  Left achilles: 1+  Motor strength: 5/5  Motor Tone: normal tone.   Involuntary movements: none.   Superficial/Primitive Reflexes: primitive reflexes were absent.   Right Correa: absent  Left Correa: absent  Right ankle clonus: absent  Left ankle clonus: absent   Negative long tract signs. Straight leg raising test is negative. Femoral stretch sign is negative.   Sensation: No sensory loss. Sensory exam:  intact to light touch, intact pain and temperature sensation, intact vibration sensation and normal proprioception.   Coordination: Normal finger to nose and heel to shin. Normal balance and negative. Romberg's sign negative.   Skin and subcutaneous tissue: Skin is warm and intact. No rash noted. No cyanosis.   Psychiatric: Judgment and insight: Normal. Orientation to person, place and time: Normal. Recent and remote memory: Intact. Mood and affect: Normal.     ASSESSMENT:   1. Postlaminectomy syndrome of lumbar region    2. Lumbar stenosis with neurogenic claudication    3. History of lumbar laminectomy    4. Polyneuropathy    5. Other osteoarthritis of spine, lumbar region    6. Osteoporosis, unspecified osteoporosis type, unspecified pathological fracture presence    7. Benign paroxysmal positional vertigo, unspecified laterality    8. Insomnia due to medical condition    9. At high risk for falls    10. Physical deconditioning      PLAN/MEDICAL DECISION MAKING: I had a lengthy conversation with Ms.Cora RENATA Venegas regarding her chronic pain condition and potential therapeutic options including risks, benefits, alternative therapies, to name a few. Patient has failed to obtain pain relief with conservative measures, interventional pain management measures and previous surgical intervention, as referenced above. Patient presents with lumbar postlaminectomy syndrome. Patient is status post multilevel laminectomies L2-L5 in 2015 for neurogenic claudication with complete resolution of her symptoms until 2016. Patient has been found not to be a surgical candidate. She has been referred for spinal cord stimulation. EMG/NCV of both lower extremities revealed chronic polyradiculopathy involving L3/L4/L5 elements bilaterally and moderate. axonal sensorimotor neuropathy. Patient underwent psychological evaluation with Dr. Lea and has been found to be an appropriate candidate for a spinal cord stimulation. I have reviewed  all available patient's medical records as well as previous therapies as referenced above.  Therefore, I have proposed the following plan:  1. Diagnostic studies: CBC with differential, PT/INR and PTT  2. Pharmacological measures: Reviewed. Discussed.   A. Patient takes Robaxin, tramadol.   B. Trial with Rheumate one tablet daily  C. Start pyridoxine 25 mg one tablet by mouth three times daily  3. Interventional pain management measures: Patient will be scheduled for a spinal cord stimulator trial with [facility].  Patient has received an educational DVD on spinal cord stimulation therapies.  4. Long-term rehabilitation efforts:  A. The patient does not have a history of falls. I did complete a risk assessment for falls. Fall precautions: Patient has been instructed regarding universal fall precautions, such as;   · Using gait aids a  cane or a rolling walker at the appropriate height at all times for ambulation   · Removing all area rugs and coffee tables to create a safe environment at home  · Ensure clean, dry floors  · Wearing supportive footwear and properly fitting clothing  · Ensure bed/chair is appropriate height and patient's feet can touch the floor  · Using a shower transfer bench  · Using walk-in shower and having shower safety bars installed  · Ensure proper lighting, minimize glare  · Have nightlights operational and in use  · Participation in an exercise program for gait training, balance training and strength  · Avoid carrying laundry up and down steps  · Ensure proper compliance and organization of medications to avoid errors   · Avoid use of over the counter sedatives and alcohol consumption  · Ensure easy access to call bell, glasses, TV control, telephone  · Ensure glasses/hearing aids are in use or close by (on top of night table)  B.  Patient will start a comprehensive physical therapy program for water therapy, therapeutic exercise, core strengthening, and vestibular rehabilitation once pain  is under control  C.  Start an exercise program such as water therapy  D.  Contrast therapy: Apply ice-packs for 15-20 minutes, followed by heating pads for 15-20 minutes to affected area   5.  The patient has been instructed to contact my office with any questions or difficulties. The patient understands the plan and agrees to proceed accordingly.       Patient Care Team:  Renée BARNETT MD as PCP - General  Renée BARNETT MD as PCP - Family Medicine  Juju Brown PA-C as Physician Assistant (Physician Assistant)  Doron Gale MD as Consulting Physician (Pain Medicine)  Domenico Reid MD as Consulting Physician (Neurosurgery)     No orders of the defined types were placed in this encounter.        No future appointments.      Doron Gale MD     EMR Dragon/Transcription disclaimer:  Much of this encounter note is an electronic transcription of spoken language to printed text. Electronic transcription of spoken language may permit erroneous, or at times, nonsensical words or phrases to be inadvertently transcribed. Although I have reviewed the note for such errors, some may still exist.

## 2018-07-31 ENCOUNTER — OFFICE VISIT (OUTPATIENT)
Dept: PAIN MEDICINE | Facility: CLINIC | Age: 78
End: 2018-07-31

## 2018-07-31 VITALS
TEMPERATURE: 98.3 F | OXYGEN SATURATION: 99 % | WEIGHT: 204.8 LBS | HEIGHT: 72 IN | DIASTOLIC BLOOD PRESSURE: 80 MMHG | HEART RATE: 93 BPM | BODY MASS INDEX: 27.74 KG/M2 | RESPIRATION RATE: 18 BRPM | SYSTOLIC BLOOD PRESSURE: 130 MMHG

## 2018-07-31 DIAGNOSIS — M48.062 LUMBAR STENOSIS WITH NEUROGENIC CLAUDICATION: ICD-10-CM

## 2018-07-31 DIAGNOSIS — G62.9 POLYNEUROPATHY: ICD-10-CM

## 2018-07-31 DIAGNOSIS — G47.01 INSOMNIA DUE TO MEDICAL CONDITION: ICD-10-CM

## 2018-07-31 DIAGNOSIS — Z91.81 AT HIGH RISK FOR FALLS: ICD-10-CM

## 2018-07-31 DIAGNOSIS — M96.1 POSTLAMINECTOMY SYNDROME OF LUMBAR REGION: ICD-10-CM

## 2018-07-31 DIAGNOSIS — M81.0 OSTEOPOROSIS, UNSPECIFIED OSTEOPOROSIS TYPE, UNSPECIFIED PATHOLOGICAL FRACTURE PRESENCE: ICD-10-CM

## 2018-07-31 DIAGNOSIS — H81.10 BENIGN PAROXYSMAL POSITIONAL VERTIGO, UNSPECIFIED LATERALITY: ICD-10-CM

## 2018-07-31 DIAGNOSIS — Z51.89 ENCOUNTER FOR OTHER SPECIFIED AFTERCARE: ICD-10-CM

## 2018-07-31 DIAGNOSIS — M47.896 OTHER OSTEOARTHRITIS OF SPINE, LUMBAR REGION: ICD-10-CM

## 2018-07-31 DIAGNOSIS — Z01.818 PRE-OP TESTING: Primary | ICD-10-CM

## 2018-07-31 DIAGNOSIS — Z98.890 HISTORY OF LUMBAR LAMINECTOMY: ICD-10-CM

## 2018-07-31 DIAGNOSIS — R53.81 PHYSICAL DECONDITIONING: ICD-10-CM

## 2018-07-31 PROCEDURE — 99204 OFFICE O/P NEW MOD 45 MIN: CPT | Performed by: ANESTHESIOLOGY

## 2018-07-31 RX ORDER — PYRIDOXINE HCL (VITAMIN B6) 25 MG
25 TABLET ORAL 3 TIMES DAILY
Qty: 90 TABLET | Refills: 5 | Status: SHIPPED | OUTPATIENT
Start: 2018-07-31 | End: 2018-08-23 | Stop reason: SDUPTHER

## 2018-07-31 RX ORDER — ME-TETRAHYDROFOLATE/B12/HRB236 1-1-500 MG
CAPSULE ORAL
Qty: 30 CAPSULE | Refills: 5 | Status: SHIPPED | OUTPATIENT
Start: 2018-07-31 | End: 2018-08-23 | Stop reason: SDUPTHER

## 2018-08-13 ENCOUNTER — APPOINTMENT (OUTPATIENT)
Dept: LAB | Facility: HOSPITAL | Age: 78
End: 2018-08-13

## 2018-08-13 LAB
APTT PPP: 30.3 SECONDS (ref 23.8–36.1)
BASOPHILS # BLD AUTO: 0.04 10*3/MM3 (ref 0–0.3)
BASOPHILS NFR BLD AUTO: 0.7 % (ref 0–2)
DEPRECATED RDW RBC AUTO: 47.1 FL (ref 37–54)
EOSINOPHIL # BLD AUTO: 0.4 10*3/MM3 (ref 0–0.7)
EOSINOPHIL NFR BLD AUTO: 6.9 % (ref 0–7)
ERYTHROCYTE [DISTWIDTH] IN BLOOD BY AUTOMATED COUNT: 13.8 % (ref 11.5–14.5)
HCT VFR BLD AUTO: 42 % (ref 37–47)
HGB BLD-MCNC: 13.5 G/DL (ref 12–16)
IMM GRANULOCYTES # BLD: 0.01 10*3/MM3 (ref 0–0.03)
IMM GRANULOCYTES NFR BLD: 0.2 % (ref 0–0.5)
INR PPP: 0.9 (ref 0.9–1.1)
LYMPHOCYTES # BLD AUTO: 1.66 10*3/MM3 (ref 1–3)
LYMPHOCYTES NFR BLD AUTO: 28.8 % (ref 16–46)
MCH RBC QN AUTO: 31.3 PG (ref 27–33)
MCHC RBC AUTO-ENTMCNC: 32.1 G/DL (ref 33–37)
MCV RBC AUTO: 97.2 FL (ref 80–94)
MONOCYTES # BLD AUTO: 0.5 10*3/MM3 (ref 0.1–0.9)
MONOCYTES NFR BLD AUTO: 8.7 % (ref 0–12)
NEUTROPHILS # BLD AUTO: 3.16 10*3/MM3 (ref 1.4–6.5)
NEUTROPHILS NFR BLD AUTO: 54.7 % (ref 40–75)
PLATELET # BLD AUTO: 302 10*3/MM3 (ref 130–400)
PMV BLD AUTO: 9.8 FL (ref 6–10)
PROTHROMBIN TIME: 12.4 SECONDS (ref 11–15.4)
RBC # BLD AUTO: 4.32 10*6/MM3 (ref 4.2–5.4)
WBC NRBC COR # BLD: 5.77 10*3/MM3 (ref 4.5–12.5)

## 2018-08-13 PROCEDURE — 36415 COLL VENOUS BLD VENIPUNCTURE: CPT

## 2018-08-13 PROCEDURE — 85610 PROTHROMBIN TIME: CPT | Performed by: ANESTHESIOLOGY

## 2018-08-13 PROCEDURE — 85730 THROMBOPLASTIN TIME PARTIAL: CPT | Performed by: ANESTHESIOLOGY

## 2018-08-13 PROCEDURE — 85025 COMPLETE CBC W/AUTO DIFF WBC: CPT | Performed by: ANESTHESIOLOGY

## 2018-08-20 ENCOUNTER — OUTSIDE FACILITY SERVICE (OUTPATIENT)
Dept: PAIN MEDICINE | Facility: CLINIC | Age: 78
End: 2018-08-20

## 2018-08-20 PROCEDURE — 99152 MOD SED SAME PHYS/QHP 5/>YRS: CPT | Performed by: ANESTHESIOLOGY

## 2018-08-20 PROCEDURE — 95972 ALYS CPLX SP/PN NPGT W/PRGRM: CPT | Performed by: ANESTHESIOLOGY

## 2018-08-20 PROCEDURE — 63650 IMPLANT NEUROELECTRODES: CPT | Performed by: ANESTHESIOLOGY

## 2018-08-21 ENCOUNTER — TELEPHONE (OUTPATIENT)
Dept: PAIN MEDICINE | Facility: CLINIC | Age: 78
End: 2018-08-21

## 2018-08-21 PROBLEM — Z46.2 ENCOUNTER FOR FITTING AND ADJUSTMENT OF NEUROPACEMAKER OF SPINAL CORD: Status: ACTIVE | Noted: 2018-08-21

## 2018-08-21 NOTE — PROGRESS NOTES
"Chief Complaint: \"I did very well during the stimulator trial.\"    Brief History: Mrs. Jewels Vneegas is a 78 y.o. female, who returns to the clinic for possible spinal cord stimulator reprogramming and removal of spinal cord stimulator trial leads placed on 08/20/2018. Ms. Jewels Venegas reports 100% pain relief of her chronic lower back and lower extremity pain along with remarkable functional improvement with the use of her stimulator device. Patient reports significant relief of her previously severe neurogenic claudication. In addition, patient reports improvement of her nocturnal pain with the use of the SCS device. Also, she reports significant improvement of her urinary urgency.  Pain level is rated as 0/10 with the stimulator \"turned on.”   Patient level ranges from 0/10 to 1/10 with the use of the spinal cord stimulator.    Patient did not take additional analgesics throughout her spinal cord stimulator trial. She has remained afebrile throughout the trial. Dressings are dry and intact. Patient denies any complications related to the procedure.   Patient denies pain, numbness and weakness in the lower extremities.  Patient denies  any new bladder or bowel problems.   Patient is very satisfied with the trial and would like to move forward with implantation of a spinal cord stimulator device.     Pain History Prior to SCS Trial:  Pain history: Patient reports a 6-year history of pain, which began without incident. Patient underwent multilevel laminectomies L2-L4 in 2015 with Dr. Reid for neurogenic claudication and did exceedingly well with resolution of her symptoms until about 2016. Pain has progressed in intensity over the past 1 year.   Pain description: constant lower back pain with intermittent exacerbation, described as crushing, sharp, pressure, and stabbing sensation.   Radiation of pain: The lower back pain radiates into the lateral and anterior aspect of the thighs, the lateral aspect of the shins, " and into the dorsum of her feet  Pain intensity today: 3/10 (sitting) standing or walking (6/10)  Average pain intensity last week: 4/10  Pain intensity ranges from: 1/10 to 9/10  Aggravating factors: Pain increases with standing longer than 1 minutes and ambulating with her cane or walker more than 5-10 minutes.  Alleviating factors: Pain decreases with sitting, lying down, heat and analgesics.   Associated symptoms:   Patient reports pain, numbness and weakness in the lower extremities, worse on the right side.  Patient denies any new bladder or bowel problems. Patient has a history of chronic bladder incontinence.   Patient reports difficulties with her balance but denies recent falls.      Review of previous therapies and additional medical records:  Jewels Venegas has already failed the following measures, including:   Conservative measures: oral analgesics, topical analgesics, physical therapy, ice and heat   Interventional measures: Patient underwent three Racz procedures with , her last procedure was in April. She reports no long-lasting relief from those procedures.  Surgical measures: Patient underwent L2-L4 laminectomies and medial factectomies with  on 06/24/2015. Patient experienced 100% pain relief from her surgery for over 1 year.   Jewels Venegas underwent follow-up neurosurgical consultation with Dr. Reid on 09/26/2017, and was found not to be a surgical candidate.  Patient underwent psychological evaluation with Dr. Coral melendez and has been found to be an appropriate candidate for a spinal cord stimulation  Jewels Venegas presents with significant comorbidities including insomnia, not engaged in treatment, hypertension, osteoporosis engaged in treatment.  In terms of current analgesics, Jewels Venegas takes: Robaxin, tramadol   I have reviewed Bishop Report #[id number] consistent to medication reconciliation.     Global Pain Scale  07-31-18 08-23-18               Pain  14  6                Feelings  3  0               Clinical outcomes  11  1               Activities  13  1               GPS Total:  41  8                  Review of Diagnostic Studies:    EMG/NCV of both lower extremities 10/04/2017: Sensorimotor neuropathy, axonal, moderate. 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 There is a minimal dextroscoliotic curvature of the lumbar spine apex L4 with slight rotatory component at the L4-L5 juncture. Multilevel osteophyte formation with bridging anteriorly of the L2-L3, L3-L4, L4-L5 and L5-S1 levels. Facets are well aligned with facet hypertrophy noted in the lower lumbar segments. Preservation of vertebral body heights. Intervertebral disc space heights are decreased at essentially all levels but most significant at the L2-L3, L3-L4, L4-L5 and L5-S1 levels where there is disc desiccation and associated degenerative change of the surrounding endplates. Axial datasets with level by level evaluation as follows:  L1-L2: Severely limited evaluation due to poor thecal sac opacification. No gross thecal sac contour irregularity.  L2-L3: Disc osteophyte complex with associated circumferential disc bulge with concomitant facet hypertrophy producing moderate to severe thecal sac effacement with moderate bilateral nerve root sleeve effacement.  L3-L4: Disc osteophyte complex with associated disc bulge with left lateralizing component involving the left subarticular recess and left neural foramina producing moderate thecal sac effacement with moderate to severe left nerve root sleeve effacement.  L4-L5: Disc osteophyte complex with disc bulge containing far right lateral and right paracentral components producing mild to moderate thecal sac effacement with mild left and total right nerve root sleeve effacement.  L5-S1: Disc osteophyte complex with circumferential disc bulge component producing mild  thecal sac effacement with moderate right nerve root sleeve effacement.  IR MYELOGRAM LUMBAR SPINE-, XR SPINE LUMBAR FLEX AND EXT: There are postoperative changes consistent with a laminectomy at the L2-L5 levels. Moderate diffuse arthropathy of the lumbar spine with disc space narrowing, endplate disease, and osteophyte formation. There is a grade 1 retrolisthesis of L2 on L3 which measures approximate 10 mm in the flexed, and extended positions. There was good filling of the thecal sac. No intrathecal mass was identified. The nerve root sleeves appear bilaterally symmetric. There is mild anterior impression on thecal sac at the L2-L3, L3-L4, and L4-L5 levels. Please correlate with CT imaging.      MRI of the lumbar spine July 31, 2017 revealed retrolisthesis of L2 on L3. Grade 1 anterior spondylolisthesis of L5 on S1. Vertebral body height is preserved. There is multilevel disc desiccation with disc space narrowing. There is normal signal intensity in the cervical cord.  The spinal cord ends at the level of L1-L2.  Disc levels:  L1-L2: Mild annular disc bulge with no central or significant neuroforaminal stenosis  L2-L3: Broad-based disc osteophyte complex.  No central canal stenosis. Moderate bilateral neuroforaminal stenosis.  L3-L4: Right paracentral protrusion.  Disc material a sense along the anterior aspect of the central canal and along the posterior aspect of the inferior endplate of L3.  This is superimposed on this osteophyte complex.  There is no central stenosis.  There is severe bilateral neuroforaminal stenosis.  L4-L5: Broad-based disc osteophyte complex.  There is no central canal stenosis.  Bilateral neuroforaminal stenosis.  L5-S1: Disc osteophyte complex.  There is no central canal stenosis. Moderate to severe right and mild left neuroforaminal stenosis.    The following portions of the patient's history were reviewed and updated as appropriate: problem list, past medical history, past surgery  "history, social history, family history, medications, and allergies    Review of Systems   Genitourinary: Positive for frequency.   Allergic/Immunologic: Positive for environmental allergies.   All other systems reviewed and are negative.      /70   Pulse 68   Temp 97.1 °F (36.2 °C) (Temporal Artery )   Resp 18   Ht 195.6 cm (77\")   Wt 92.5 kg (204 lb)   SpO2 98%   BMI 24.19 kg/m²       Physical Exam   Neurologic Exam  Constitutional: Patient is oriented to person, place, and time. Patient appears well-developed and well-nourished.   HEENT: Head: Normocephalic and atraumatic. Eyes: Conjunctivae and lids are normal. Pupils: Equal, round, reactive to light.   Neck: Trachea normal. Neck supple. No JVD present.   Pulmonary Respiratory effort: No increased work of breathing or signs of respiratory distress. Auscultation of lungs: Clear to auscultation.   Cardiovascular Auscultation of heart: Normal rate and rhythm, normal S1 and S2, no murmurs.   Peripheral vascular exam: Femoral: right 2+, left 2+. Posterior tibialis: right 2+ and left 2+. Dorsalis pedis: right 2+ and left 2+. No edema.   Abdomen: The abdomen was soft and nontender. Bowel sounds were normal.   Musculoskeletal   Gait and station: Gait evaluation demonstrated shuffling   Lumbar spine: The range of motion of the lumbar spine is limited secondary to pain. Extension of the lumbar spine increased and reproduced pain. Lumbar facet joint loading maneuvers are positive.  Sean and Gaenslen's tests are negative   Piriformis maneuvers are negative   Palpation of the bilateral ischial tuberosities, unrevealing   Palpation of the bilateral greater trochanter, unrevealing   Examination of the Iliotibial band: unrevealing   Hip joints: The range of motion of the hip joints is full and without pain   Neurological: Patient is alert and oriented to person, place, and time. Speech: speech is normal. Cortical function: Normal mental status.   Cranial nerves: " Cranial nerves 2-12 intact.   Reflex Scores: Right patellar: 1+ Left patellar: 1+ Right achilles: 1+ Left achilles: 1+. Motor strength: 5/5. Negative long tract signs. Straight leg raising test is negative. Femoral stretch sign is negative.   Sensation: No sensory loss. Sensory exam: intact to light touch, intact pain and temperature sensation, intact vibration sensation and normal proprioception.   Coordination: Normal finger to nose and heel to shin. Normal balance and negative. Romberg's sign negative.   Skin and subcutaneous tissue: Skin is warm and intact. No rash noted. No cyanosis.   Psychiatric: Judgment and insight: Normal. Orientation to person, place and time: Normal. Recent and remote memory: Intact. Mood and affect: Normal.     PROCEDURES:   Procedure #1: Analysis of the spinal cord stimulator device with complex spinal cord stimulator reprogramming   Patient used the [employer] spinal cord stimulator device 100% of the time since initiation of the trial phase.  The spinal cord stimulator device was reprogrammed under my direct supervision and two programs were created by adjusting electrode polarities, amplitude, pulse width, pulse rate, BurstDR, micro-dosing, in the following fashion;    Program TWO (Best Program):    Electrode polarities: 10-, 13+  Amplitude: 0.25-1.5 mA     Pulse width: 1000 mcs  Rate: 40 Hz  IntraBurst rate: 500 Hz  Micro-dosing ratio: 30:90    Patient experienced significant pain relief with coverage with pleasant paresthesia in all areas of chronic pain.  Time spent reprogrammin minutes  A copy of the telemetry report will be scanned in the patient's chart.    Procedure #2: Removal of spinal cord stimulator trial leads.   Two leads were removed with tips intact. There is no erythema, drainage, or fluid accumulation at the site. The area was cleansed with chlorhexidine.  A small Covaderm was applied.     ASSESSMENT:   1. Postlaminectomy syndrome of lumbar region    2. History  of lumbar laminectomy    3. Lumbar stenosis with neurogenic claudication    4. Polyneuropathy    5. Other osteoarthritis of spine, lumbar region    6. Benign paroxysmal positional vertigo, unspecified laterality    7. Osteoporosis, unspecified osteoporosis type, unspecified pathological fracture presence    8. Insomnia due to medical condition    9. At high risk for falls    10. Physical deconditioning    11. Encounter for fitting and adjustment of neuropacemaker of spinal cord      PLAN: Patient's chronic pain condition has been significantly improved during her spinal cord stimulator trial. I had a lengthy conversation with Ms.Jewels Venegas regarding her chronic pain condition and potential therapeutic options including risks, benefits, alternative therapies, to name a few. Patient has failed to obtain pain relief with conservative measures, interventional pain management measures and previous surgical intervention, as referenced above. Patient presents with lumbar postlaminectomy syndrome. EMG/NCV of both lower extremities revealed chronic polyradiculopathy involving L3/L4/L5 elements bilaterally and moderate. axonal sensorimotor neuropathy. Patient underwent psychological evaluation with Dr. Lea and has been found to be an appropriate candidate for a spinal cord stimulation. I have reviewed all available patient's medical records as well as previous therapies as referenced above. Patient is very satisfied with the trial and would like to move forward with implantation of a spinal cord stimulator device. Therefore, I have proposed the following plan:  1. Referral to Dr. Vince Olivas in consultation for implantation of a spinal cord stimulator device. I have recommended [employer] Penta paddle lead (Full Body MRI compatible)   with the top electrodes projecting at the level of the superior endplate of the T7 vertebral level. I have recommended [employer] Proclaim 7 IPG Non-Rechargeable (Full Body MRI  compatible) . Patient will follow-up with morro ortiz.   2. Recommendations for infection control prior to surgery, as follows:  · Start over-the-counter Hibiclens showers daily 5 days before surgery  · Chlorhexidine towelettes (given at time of Pre-Admission Testing appointment) night before and day of surgery  3. Diagnostics: MRI of the thoracic spine without contrast to assess patency of thoracic epidural space for placement of surgical leads.  4. Pharmacological measures: Reviewed. Discussed.   A. Patient takes Robaxin, tramadol.   B. Trial with Rheumate one tablet daily  C. Start pyridoxine 25 mg one tablet by mouth three times daily  5.  Long-term rehabilitation efforts:  A. The patient does not have a history of falls. I did complete a risk assessment for falls. Fall precautions: Patient has been instructed regarding universal fall precautions, such as;   · Using gait aids a  cane or a rolling walker at the appropriate height at all times for ambulation   · Removing all area rugs and coffee tables to create a safe environment at home  · Ensure clean, dry floors  · Wearing supportive footwear and properly fitting clothing  · Ensure bed/chair is appropriate height and patient's feet can touch the floor  · Using a shower transfer bench  · Using walk-in shower and having shower safety bars installed  · Ensure proper lighting, minimize glare  · Have nightlights operational and in use  · Participation in an exercise program for gait training, balance training and strength  · Avoid carrying laundry up and down steps  · Ensure proper compliance and organization of medications to avoid errors   · Avoid use of over the counter sedatives and alcohol consumption  · Ensure easy access to call bell, glasses, TV control, telephone  · Ensure glasses/hearing aids are in use or close by (on top of night table)  B.  Patient will start a comprehensive physical therapy program for water therapy, therapeutic exercise, core  strengthening, and vestibular rehabilitation once pain is under control  C.  Start an exercise program such as water therapy  D.  Contrast therapy: Apply ice-packs for 15-20 minutes, followed by heating pads for 15-20 minutes to affected area   6.  The patient has been instructed to contact my office with any questions or difficulties. The patient understands the plan and agrees to proceed accordingly.      Patient Care Team:  Renée Multani MD as PCP - General  Renée Multani MD as PCP - Family Medicine  Apro, Juju Pryor PA-C as Physician Assistant (Physician Assistant)  Doron Gale MD as Consulting Physician (Pain Medicine)  Domenico Reid MD as Consulting Physician (Neurosurgery)     No orders of the defined types were placed in this encounter.        No future appointments.      Doron Gale MD      EMR Dragon/Transcription disclaimer:  Much of this encounter note is an electronic transcription of spoken language to printed text. Electronic transcription of spoken language may permit erroneous, or at times, nonsensical words or phrases to be inadvertently transcribed. Although I have reviewed the note for such errors, some may still exist.

## 2018-08-21 NOTE — TELEPHONE ENCOUNTER
Patient had spinal cord stimulator trial with [employer]. [employer] on 08/20/2018. Called to f/u with patient post procedure. Reached voicemail. Left message for return call

## 2018-08-23 ENCOUNTER — OFFICE VISIT (OUTPATIENT)
Dept: PAIN MEDICINE | Facility: CLINIC | Age: 78
End: 2018-08-23

## 2018-08-23 VITALS
BODY MASS INDEX: 27.63 KG/M2 | RESPIRATION RATE: 18 BRPM | SYSTOLIC BLOOD PRESSURE: 124 MMHG | TEMPERATURE: 97.1 F | WEIGHT: 204 LBS | HEART RATE: 68 BPM | HEIGHT: 72 IN | OXYGEN SATURATION: 98 % | DIASTOLIC BLOOD PRESSURE: 70 MMHG

## 2018-08-23 DIAGNOSIS — Z46.2 ENCOUNTER FOR FITTING AND ADJUSTMENT OF NEUROPACEMAKER OF SPINAL CORD: ICD-10-CM

## 2018-08-23 DIAGNOSIS — M48.062 LUMBAR STENOSIS WITH NEUROGENIC CLAUDICATION: ICD-10-CM

## 2018-08-23 DIAGNOSIS — Z01.818 PRE-OP TESTING: ICD-10-CM

## 2018-08-23 DIAGNOSIS — Z98.890 HISTORY OF LUMBAR LAMINECTOMY: ICD-10-CM

## 2018-08-23 DIAGNOSIS — M47.896 OTHER OSTEOARTHRITIS OF SPINE, LUMBAR REGION: ICD-10-CM

## 2018-08-23 DIAGNOSIS — G62.9 POLYNEUROPATHY: ICD-10-CM

## 2018-08-23 DIAGNOSIS — Z91.81 AT HIGH RISK FOR FALLS: ICD-10-CM

## 2018-08-23 DIAGNOSIS — M96.1 POSTLAMINECTOMY SYNDROME OF LUMBAR REGION: Primary | ICD-10-CM

## 2018-08-23 DIAGNOSIS — M81.0 OSTEOPOROSIS, UNSPECIFIED OSTEOPOROSIS TYPE, UNSPECIFIED PATHOLOGICAL FRACTURE PRESENCE: ICD-10-CM

## 2018-08-23 DIAGNOSIS — G47.01 INSOMNIA DUE TO MEDICAL CONDITION: ICD-10-CM

## 2018-08-23 DIAGNOSIS — M96.1 POSTLAMINECTOMY SYNDROME OF LUMBAR REGION: ICD-10-CM

## 2018-08-23 DIAGNOSIS — H81.10 BENIGN PAROXYSMAL POSITIONAL VERTIGO, UNSPECIFIED LATERALITY: ICD-10-CM

## 2018-08-23 DIAGNOSIS — R53.81 PHYSICAL DECONDITIONING: ICD-10-CM

## 2018-08-23 PROCEDURE — 95972 ALYS CPLX SP/PN NPGT W/PRGRM: CPT | Performed by: ANESTHESIOLOGY

## 2018-08-23 PROCEDURE — 99024 POSTOP FOLLOW-UP VISIT: CPT | Performed by: ANESTHESIOLOGY

## 2018-08-23 RX ORDER — PYRIDOXINE HCL (VITAMIN B6) 25 MG
25 TABLET ORAL 3 TIMES DAILY
Qty: 90 TABLET | Refills: 5 | Status: SHIPPED | OUTPATIENT
Start: 2018-08-23 | End: 2019-12-20

## 2018-08-23 RX ORDER — ME-TETRAHYDROFOLATE/B12/HRB236 1-1-500 MG
CAPSULE ORAL
Qty: 30 CAPSULE | Refills: 5 | Status: SHIPPED | OUTPATIENT
Start: 2018-08-23 | End: 2019-08-06

## 2018-08-29 ENCOUNTER — HOSPITAL ENCOUNTER (OUTPATIENT)
Dept: MRI IMAGING | Facility: HOSPITAL | Age: 78
Discharge: HOME | End: 2018-08-29
Attending: ANESTHESIOLOGY | Admitting: ANESTHESIOLOGY

## 2018-08-29 PROCEDURE — 72146 MRI CHEST SPINE W/O DYE: CPT

## 2018-09-04 ENCOUNTER — TELEPHONE (OUTPATIENT)
Dept: PAIN MEDICINE | Facility: CLINIC | Age: 78
End: 2018-09-04

## 2018-09-04 RX ORDER — EMOLLIENT COMBINATION NO.32
EMULSION, EXTENDED RELEASE TOPICAL
Qty: 225 G | Refills: 0 | Status: SHIPPED | OUTPATIENT
Start: 2018-09-04

## 2018-09-04 NOTE — TELEPHONE ENCOUNTER
Patient called to report a possible reaction to the skin prep. She reports that after the removal of the dressing that she had fine blisters and peeling.   Patient is using caladryl lotion and Benadryl PO OTC.  Patient reports that she is still itchy with a red rash.    Patient was prepped with ChloraPrep and Duraprep and Ioban.   Patient reports that she had a reaction to betadine many years ago, that she forgot of. Patient also reports that she is allergic to Dial and Zest soaps.     Spoke with patient advised to use Epiceram twice daily to assist with healing of back.

## 2018-09-14 ENCOUNTER — OFFICE VISIT (OUTPATIENT)
Dept: NEUROSURGERY | Facility: CLINIC | Age: 78
End: 2018-09-14

## 2018-09-14 ENCOUNTER — PREP FOR SURGERY (OUTPATIENT)
Dept: OTHER | Facility: HOSPITAL | Age: 78
End: 2018-09-14

## 2018-09-14 VITALS — WEIGHT: 199 LBS | TEMPERATURE: 98.8 F | BODY MASS INDEX: 31.23 KG/M2 | HEIGHT: 67 IN

## 2018-09-14 DIAGNOSIS — M48.062 LUMBAR STENOSIS WITH NEUROGENIC CLAUDICATION: Primary | ICD-10-CM

## 2018-09-14 DIAGNOSIS — M48.062 SPINAL STENOSIS OF LUMBAR REGION WITH NEUROGENIC CLAUDICATION: ICD-10-CM

## 2018-09-14 DIAGNOSIS — M96.1 POST LAMINECTOMY SYNDROME: Primary | ICD-10-CM

## 2018-09-14 PROCEDURE — 99213 OFFICE O/P EST LOW 20 MIN: CPT | Performed by: NEUROLOGICAL SURGERY

## 2018-09-14 RX ORDER — SODIUM CHLORIDE 0.9 % (FLUSH) 0.9 %
1-10 SYRINGE (ML) INJECTION AS NEEDED
Status: CANCELLED | OUTPATIENT
Start: 2018-09-14

## 2018-09-14 RX ORDER — CEFAZOLIN SODIUM 2 G/100ML
2 INJECTION, SOLUTION INTRAVENOUS ONCE
Status: CANCELLED | OUTPATIENT
Start: 2018-09-14 | End: 2018-09-14

## 2018-09-14 RX ORDER — FAMOTIDINE 20 MG/1
20 TABLET, FILM COATED ORAL
Status: CANCELLED | OUTPATIENT
Start: 2018-09-14

## 2018-09-14 NOTE — H&P
Patient: Jewels Venegas  : 1940     Primary Care Provider: Renée Multani MD     Requesting Provider: As above           History     Chief Complaint: Back and bilateral lower extremity pain.     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  She underwent multilevel lumbar laminectomy by my colleague Dr. Reid a number of years ago and did quite well for about a year.  She's developed increasing back pain that extends into both legs, right greater than left.  She is very intolerant of standing and walking.  She does better when off her feet.  The pain extends into the inner thighs and then into the lateral calves.  She's had some intermittent numbness in her legs as well.  A recent trial of a [employer] epidural spinal cord stimulator provided about 90% relief for her pain.  She developed some blistering that may well have been related to some of the tape that was used on her bandage.     Review of Systems   Constitutional: Negative for activity change, appetite change, chills, diaphoresis, fatigue, fever and unexpected weight change.   HENT: Positive for postnasal drip. Negative for congestion, dental problem, drooling, ear discharge, ear pain, facial swelling, hearing loss, mouth sores, nosebleeds, rhinorrhea, sinus pressure, sneezing, sore throat, tinnitus, trouble swallowing and voice change.    Eyes: Negative for photophobia, pain, discharge, redness, itching and visual disturbance.   Respiratory: Negative for apnea, cough, choking, chest tightness, shortness of breath, wheezing and stridor.    Cardiovascular: Negative for chest pain, palpitations and leg swelling.   Gastrointestinal: Negative for abdominal distention, abdominal pain, anal bleeding, blood in stool, constipation, diarrhea, nausea, rectal pain and vomiting.   Endocrine: Negative for cold intolerance, heat intolerance, polydipsia, polyphagia and polyuria.   Genitourinary: Positive  for urgency. Negative for decreased urine volume, difficulty urinating, dysuria, enuresis, flank pain, frequency, genital sores and hematuria.   Musculoskeletal: Positive for back pain and gait problem. Negative for arthralgias, joint swelling, myalgias, neck pain and neck stiffness.   Skin: Negative for color change, pallor, rash and wound.   Allergic/Immunologic: Positive for environmental allergies. Negative for food allergies and immunocompromised state.   Neurological: Positive for numbness. Negative for dizziness, tremors, seizures, syncope, facial asymmetry, speech difficulty, weakness, light-headedness and headaches.   Hematological: Negative for adenopathy. Does not bruise/bleed easily.   Psychiatric/Behavioral: Negative for agitation, behavioral problems, confusion, decreased concentration, dysphoric mood, hallucinations, self-injury, sleep disturbance and suicidal ideas. The patient is not nervous/anxious and is not hyperactive.          The patient's past medical history, past surgical history, family history, and social history have been reviewed at length in the electronic medical record.     Past Medical History:   Diagnosis Date   • Arthritis    • Back problem    • Hypertension    • Osteoporosis    • Thyroid disease      Past Surgical History:   Procedure Laterality Date   • APPENDECTOMY  1977   • CHOLECYSTECTOMY     • LUMBAR LAMINECTOMY  06/2015    Dr. Domenico Reid, [facility] L2-5     Family History   Problem Relation Age of Onset   • Cancer Father    • Heart disease Father    • Breast cancer Neg Hx      Social History     Social History   • Marital status:      Spouse name: N/A   • Number of children: N/A   • Years of education: N/A     Occupational History   • Not on file.     Social History Main Topics   • Smoking status: Former Smoker     Quit date: 1982   • Smokeless tobacco: Never Used   • Alcohol use No   • Drug use: No   • Sexual activity: Defer     Other Topics  "Concern   • Not on file     Social History Narrative   • No narrative on file     Allergies   Allergen Reactions   • Betadine [Povidone Iodine] Rash     Itching, and blisters   • Adhesive Tape Rash   • Codeine Itching       Physical Exam:   Temp 98.8 °F (37.1 °C) (Temporal Artery )   Ht 170.2 cm (67\")   Wt 90.3 kg (199 lb)   BMI 31.17 kg/m²   CONSTITUTIONAL: Patient is well-nourished, pleasant and appears stated age.  CV: Heart regular rate and rhythm without murmur, rub, or gallop.  PULMONARY: Lungs are clear to ascultation.  MUSCULOSKELETAL:  Straight leg raising is negative.  Sean's Sign is negative.  ROM in back is normal.  Tenderness in the back to palpation is not observed.  NEUROLOGICAL:  Orientation, memory, attention span, language function, and cognition have been examined and are intact.  Strength is intact in the lower extremities to direct testing.  Muscle tone is normal throughout.  Station and gait are normal.  Sensation is intact to light touch testing throughout except the lateral aspect of her right calf where it is diminished.  Deep tendon reflexes are 1+ and symmetrical.  Coordination is intact.        Medical Decision Making     Data Review:   Lumbar myelogram does not reveal significant root compromise although there is diffuse spondylosis.    Her thoracic MRI demonstrates some diffuse spondylosis but I don't see a high-grade lesion that would preclude lead placement.     Diagnosis:   Lumbar spondylosis.     Treatment Options:   Dr. Gale has requested [employer]. Andrés epidural spinal cord stimulator placement with the Penta lead projecting to the superior T7 level.  The nature of the procedure as well as the potential risks, complications, limitations, and alternatives to the procedure were discussed at length with the patient and the patient has agreed to proceed with surgery.  We will avoid the use of tape on her dressings postoperatively.          Diagnosis Plan   1. Post laminectomy " syndrome      2. Spinal stenosis of lumbar region with neurogenic claudication

## 2018-09-14 NOTE — PROGRESS NOTES
Patient: Jewels Venegas  : 1940    Primary Care Provider: Renée Multani MD    Requesting Provider: As above        History    Chief Complaint: Back and bilateral lower extremity pain.    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  She underwent multilevel lumbar laminectomy by my colleague Dr. Reid a number of years ago and did quite well for about a year.  She's developed increasing back pain that extends into both legs, right greater than left.  She is very intolerant of standing and walking.  She does better when off her feet.  The pain extends into the inner thighs and then into the lateral calves.  She's had some intermittent numbness in her legs as well.  A recent trial of a [place]. Andrés epidural spinal cord stimulator provided about 90% relief for her pain.  She developed some blistering that may well have been related to some of the tape that was used on her bandage.    Review of Systems   Constitutional: Negative for activity change, appetite change, chills, diaphoresis, fatigue, fever and unexpected weight change.   HENT: Positive for postnasal drip. Negative for congestion, dental problem, drooling, ear discharge, ear pain, facial swelling, hearing loss, mouth sores, nosebleeds, rhinorrhea, sinus pressure, sneezing, sore throat, tinnitus, trouble swallowing and voice change.    Eyes: Negative for photophobia, pain, discharge, redness, itching and visual disturbance.   Respiratory: Negative for apnea, cough, choking, chest tightness, shortness of breath, wheezing and stridor.    Cardiovascular: Negative for chest pain, palpitations and leg swelling.   Gastrointestinal: Negative for abdominal distention, abdominal pain, anal bleeding, blood in stool, constipation, diarrhea, nausea, rectal pain and vomiting.   Endocrine: Negative for cold intolerance, heat intolerance, polydipsia, polyphagia and polyuria.   Genitourinary: Positive for  "urgency. Negative for decreased urine volume, difficulty urinating, dysuria, enuresis, flank pain, frequency, genital sores and hematuria.   Musculoskeletal: Positive for back pain and gait problem. Negative for arthralgias, joint swelling, myalgias, neck pain and neck stiffness.   Skin: Negative for color change, pallor, rash and wound.   Allergic/Immunologic: Positive for environmental allergies. Negative for food allergies and immunocompromised state.   Neurological: Positive for numbness. Negative for dizziness, tremors, seizures, syncope, facial asymmetry, speech difficulty, weakness, light-headedness and headaches.   Hematological: Negative for adenopathy. Does not bruise/bleed easily.   Psychiatric/Behavioral: Negative for agitation, behavioral problems, confusion, decreased concentration, dysphoric mood, hallucinations, self-injury, sleep disturbance and suicidal ideas. The patient is not nervous/anxious and is not hyperactive.        The patient's past medical history, past surgical history, family history, and social history have been reviewed at length in the electronic medical record.    Physical Exam:   Temp 98.8 °F (37.1 °C) (Temporal Artery )   Ht 170.2 cm (67\")   Wt 90.3 kg (199 lb)   BMI 31.17 kg/m²   CONSTITUTIONAL: Patient is well-nourished, pleasant and appears stated age.  CV: Heart regular rate and rhythm without murmur, rub, or gallop.  PULMONARY: Lungs are clear to ascultation.  MUSCULOSKELETAL:  Straight leg raising is negative.  Sean's Sign is negative.  ROM in back is normal.  Tenderness in the back to palpation is not observed.  NEUROLOGICAL:  Orientation, memory, attention span, language function, and cognition have been examined and are intact.  Strength is intact in the lower extremities to direct testing.  Muscle tone is normal throughout.  Station and gait are normal.  Sensation is intact to light touch testing throughout except the lateral aspect of her right calf where it is " diminished.  Deep tendon reflexes are 1+ and symmetrical.  Coordination is intact.      Medical Decision Making    Data Review:   Lumbar myelogram does not reveal significant root compromise although there is diffuse spondylosis.    Her thoracic MRI demonstrates some diffuse spondylosis but I don't see a high-grade lesion that would preclude lead placement.    Diagnosis:   Lumbar spondylosis.    Treatment Options:   Dr. Gale has requested St. Andrés epidural spinal cord stimulator placement with the Penta lead projecting to the superior T7 level.  The nature of the procedure as well as the potential risks, complications, limitations, and alternatives to the procedure were discussed at length with the patient and the patient has agreed to proceed with surgery.       Diagnosis Plan   1. Post laminectomy syndrome     2. Spinal stenosis of lumbar region with neurogenic claudication         Scribed for Vince Olivas MD by Ilene Kumar CMA on 09/14/2018 at 3:19 PM      I, Dr. Olivas, personally performed the services described in the documentation, as scribed in my presence, and it is both accurate and complete.

## 2018-09-25 ENCOUNTER — APPOINTMENT (OUTPATIENT)
Dept: PREADMISSION TESTING | Facility: HOSPITAL | Age: 78
End: 2018-09-25

## 2018-09-25 VITALS — HEIGHT: 66 IN | WEIGHT: 200.4 LBS | BODY MASS INDEX: 32.21 KG/M2

## 2018-09-25 DIAGNOSIS — M48.062 LUMBAR STENOSIS WITH NEUROGENIC CLAUDICATION: ICD-10-CM

## 2018-09-25 PROCEDURE — 93010 ELECTROCARDIOGRAM REPORT: CPT | Performed by: INTERNAL MEDICINE

## 2018-09-25 PROCEDURE — 87081 CULTURE SCREEN ONLY: CPT | Performed by: NEUROLOGICAL SURGERY

## 2018-09-25 PROCEDURE — 93005 ELECTROCARDIOGRAM TRACING: CPT

## 2018-09-25 RX ORDER — ACETAMINOPHEN 500 MG
1000 TABLET ORAL 2 TIMES DAILY
COMMUNITY

## 2018-09-25 RX ORDER — CHLORAL HYDRATE 500 MG
1 CAPSULE ORAL DAILY
COMMUNITY
End: 2019-12-20

## 2018-09-25 RX ORDER — LEVOCETIRIZINE DIHYDROCHLORIDE 5 MG/1
5 TABLET, FILM COATED ORAL EVERY EVENING
COMMUNITY
End: 2019-12-20

## 2018-09-25 RX ORDER — AMLODIPINE BESYLATE 5 MG/1
5 TABLET ORAL DAILY
COMMUNITY
End: 2019-08-23 | Stop reason: HOSPADM

## 2018-09-25 NOTE — DISCHARGE INSTRUCTIONS

## 2018-09-25 NOTE — PAT
Bactroban and Chlorhexidine Prescription given during PAT visit, as well as written and verbal instructions given to patient during PAT visit.  Patient instructed to remove all jewelry for procedure.  Patient was instructed that if unable to remove jewelry especially rings to request assistance from a jeweler. Explained that if the piece of jewelry could not be removed before arrival to preop that it will be cut off.  Reinforced with patient that all jewelry must be removed for safety reasons that taping a ring was not an option.  Patient verbalized understanding. Patient stated she was planning on meeting with a jeweler prior to surgery to have a wedding band removed.

## 2018-09-27 LAB — MRSA SPEC QL CULT: NORMAL

## 2018-09-28 ENCOUNTER — ANESTHESIA EVENT (OUTPATIENT)
Dept: PERIOP | Facility: HOSPITAL | Age: 78
End: 2018-09-28

## 2018-10-01 ENCOUNTER — ANESTHESIA (OUTPATIENT)
Dept: PERIOP | Facility: HOSPITAL | Age: 78
End: 2018-10-01

## 2018-10-01 ENCOUNTER — APPOINTMENT (OUTPATIENT)
Dept: GENERAL RADIOLOGY | Facility: HOSPITAL | Age: 78
End: 2018-10-01

## 2018-10-01 ENCOUNTER — HOSPITAL ENCOUNTER (OUTPATIENT)
Facility: HOSPITAL | Age: 78
Discharge: HOME | End: 2018-10-01
Attending: NEUROLOGICAL SURGERY | Admitting: NEUROLOGICAL SURGERY

## 2018-10-01 VITALS
HEART RATE: 76 BPM | TEMPERATURE: 97.8 F | BODY MASS INDEX: 31.39 KG/M2 | SYSTOLIC BLOOD PRESSURE: 142 MMHG | RESPIRATION RATE: 18 BRPM | DIASTOLIC BLOOD PRESSURE: 70 MMHG | WEIGHT: 200 LBS | OXYGEN SATURATION: 97 % | HEIGHT: 67 IN

## 2018-10-01 DIAGNOSIS — M48.062 LUMBAR STENOSIS WITH NEUROGENIC CLAUDICATION: ICD-10-CM

## 2018-10-01 LAB — POTASSIUM BLDA-SCNC: 3.67 MMOL/L (ref 3.5–5.3)

## 2018-10-01 PROCEDURE — 84132 ASSAY OF SERUM POTASSIUM: CPT | Performed by: NEUROLOGICAL SURGERY

## 2018-10-01 PROCEDURE — 63655 IMPLANT NEUROELECTRODES: CPT | Performed by: NEUROLOGICAL SURGERY

## 2018-10-01 PROCEDURE — 63655 IMPLANT NEUROELECTRODES: CPT | Performed by: PHYSICIAN ASSISTANT

## 2018-10-01 PROCEDURE — 25010000003 CEFAZOLIN IN DEXTROSE 2-4 GM/100ML-% SOLUTION: Performed by: NEUROLOGICAL SURGERY

## 2018-10-01 PROCEDURE — 25010000002 FENTANYL CITRATE (PF) 100 MCG/2ML SOLUTION: Performed by: NURSE ANESTHETIST, CERTIFIED REGISTERED

## 2018-10-01 PROCEDURE — 25010000002 PROPOFOL 10 MG/ML EMULSION: Performed by: NURSE ANESTHETIST, CERTIFIED REGISTERED

## 2018-10-01 PROCEDURE — 63685 INS/RPLC SPI NPG/RCVR POCKET: CPT | Performed by: NEUROLOGICAL SURGERY

## 2018-10-01 PROCEDURE — C1767 GENERATOR, NEURO NON-RECHARG: HCPCS | Performed by: NEUROLOGICAL SURGERY

## 2018-10-01 PROCEDURE — 25010000002 NEOSTIGMINE 10 MG/10ML SOLUTION: Performed by: NURSE ANESTHETIST, CERTIFIED REGISTERED

## 2018-10-01 PROCEDURE — 25010000002 VANCOMYCIN PER 500 MG: Performed by: NEUROLOGICAL SURGERY

## 2018-10-01 PROCEDURE — 25010000002 HYDROMORPHONE PER 4 MG: Performed by: NURSE ANESTHETIST, CERTIFIED REGISTERED

## 2018-10-01 PROCEDURE — 76000 FLUOROSCOPY <1 HR PHYS/QHP: CPT

## 2018-10-01 PROCEDURE — 25010000002 DEXAMETHASONE PER 1 MG: Performed by: NURSE ANESTHETIST, CERTIFIED REGISTERED

## 2018-10-01 PROCEDURE — 25010000002 ONDANSETRON PER 1 MG: Performed by: NURSE ANESTHETIST, CERTIFIED REGISTERED

## 2018-10-01 PROCEDURE — 63710000001 FAMOTIDINE 20 MG TABLET: Performed by: NEUROLOGICAL SURGERY

## 2018-10-01 PROCEDURE — A9270 NON-COVERED ITEM OR SERVICE: HCPCS | Performed by: NEUROLOGICAL SURGERY

## 2018-10-01 PROCEDURE — C1778 LEAD, NEUROSTIMULATOR: HCPCS | Performed by: NEUROLOGICAL SURGERY

## 2018-10-01 DEVICE — WAX BONE HEMO AESCULAP 2.5GM: Type: IMPLANTABLE DEVICE | Site: SPINE THORACIC | Status: FUNCTIONAL

## 2018-10-01 DEVICE — 3MM LEAD, 60 CM
Type: IMPLANTABLE DEVICE | Site: SPINE THORACIC | Status: FUNCTIONAL
Brand: PENTA™

## 2018-10-01 DEVICE — IMPLANTABLE PULSE GENERATOR
Type: IMPLANTABLE DEVICE | Site: BACK | Status: FUNCTIONAL
Brand: PROCLAIM™

## 2018-10-01 RX ORDER — LIDOCAINE HYDROCHLORIDE 10 MG/ML
INJECTION, SOLUTION EPIDURAL; INFILTRATION; INTRACAUDAL; PERINEURAL AS NEEDED
Status: DISCONTINUED | OUTPATIENT
Start: 2018-10-01 | End: 2018-10-01 | Stop reason: SURG

## 2018-10-01 RX ORDER — HYDROMORPHONE HYDROCHLORIDE 1 MG/ML
0.5 INJECTION, SOLUTION INTRAMUSCULAR; INTRAVENOUS; SUBCUTANEOUS
Status: DISCONTINUED | OUTPATIENT
Start: 2018-10-01 | End: 2018-10-01 | Stop reason: HOSPADM

## 2018-10-01 RX ORDER — FAMOTIDINE 20 MG/1
20 TABLET, FILM COATED ORAL
Status: COMPLETED | OUTPATIENT
Start: 2018-10-01 | End: 2018-10-01

## 2018-10-01 RX ORDER — SODIUM CHLORIDE, SODIUM LACTATE, POTASSIUM CHLORIDE, CALCIUM CHLORIDE 600; 310; 30; 20 MG/100ML; MG/100ML; MG/100ML; MG/100ML
INJECTION, SOLUTION INTRAVENOUS CONTINUOUS PRN
Status: DISCONTINUED | OUTPATIENT
Start: 2018-10-01 | End: 2018-10-01 | Stop reason: SURG

## 2018-10-01 RX ORDER — ONDANSETRON 2 MG/ML
INJECTION INTRAMUSCULAR; INTRAVENOUS AS NEEDED
Status: DISCONTINUED | OUTPATIENT
Start: 2018-10-01 | End: 2018-10-01 | Stop reason: SURG

## 2018-10-01 RX ORDER — SODIUM CHLORIDE, SODIUM LACTATE, POTASSIUM CHLORIDE, CALCIUM CHLORIDE 600; 310; 30; 20 MG/100ML; MG/100ML; MG/100ML; MG/100ML
9 INJECTION, SOLUTION INTRAVENOUS CONTINUOUS PRN
Status: DISCONTINUED | OUTPATIENT
Start: 2018-10-01 | End: 2018-10-01 | Stop reason: HOSPADM

## 2018-10-01 RX ORDER — PROMETHAZINE HYDROCHLORIDE 25 MG/ML
6.25 INJECTION, SOLUTION INTRAMUSCULAR; INTRAVENOUS ONCE AS NEEDED
Status: DISCONTINUED | OUTPATIENT
Start: 2018-10-01 | End: 2018-10-01 | Stop reason: HOSPADM

## 2018-10-01 RX ORDER — LIDOCAINE HYDROCHLORIDE 10 MG/ML
0.5 INJECTION, SOLUTION EPIDURAL; INFILTRATION; INTRACAUDAL; PERINEURAL ONCE AS NEEDED
Status: COMPLETED | OUTPATIENT
Start: 2018-10-01 | End: 2018-10-01

## 2018-10-01 RX ORDER — SODIUM CHLORIDE 9 MG/ML
INJECTION, SOLUTION INTRAVENOUS AS NEEDED
Status: DISCONTINUED | OUTPATIENT
Start: 2018-10-01 | End: 2018-10-01 | Stop reason: HOSPADM

## 2018-10-01 RX ORDER — SODIUM CHLORIDE 0.9 % (FLUSH) 0.9 %
1-10 SYRINGE (ML) INJECTION AS NEEDED
Status: DISCONTINUED | OUTPATIENT
Start: 2018-10-01 | End: 2018-10-01

## 2018-10-01 RX ORDER — NEOSTIGMINE METHYLSULFATE 1 MG/ML
INJECTION, SOLUTION INTRAVENOUS AS NEEDED
Status: DISCONTINUED | OUTPATIENT
Start: 2018-10-01 | End: 2018-10-01 | Stop reason: SURG

## 2018-10-01 RX ORDER — DEXAMETHASONE SODIUM PHOSPHATE 4 MG/ML
INJECTION, SOLUTION INTRA-ARTICULAR; INTRALESIONAL; INTRAMUSCULAR; INTRAVENOUS; SOFT TISSUE AS NEEDED
Status: DISCONTINUED | OUTPATIENT
Start: 2018-10-01 | End: 2018-10-01 | Stop reason: SURG

## 2018-10-01 RX ORDER — ONDANSETRON 2 MG/ML
4 INJECTION INTRAMUSCULAR; INTRAVENOUS ONCE AS NEEDED
Status: DISCONTINUED | OUTPATIENT
Start: 2018-10-01 | End: 2018-10-01 | Stop reason: HOSPADM

## 2018-10-01 RX ORDER — FENTANYL CITRATE 50 UG/ML
INJECTION, SOLUTION INTRAMUSCULAR; INTRAVENOUS AS NEEDED
Status: DISCONTINUED | OUTPATIENT
Start: 2018-10-01 | End: 2018-10-01 | Stop reason: SURG

## 2018-10-01 RX ORDER — HYDROCODONE BITARTRATE AND ACETAMINOPHEN 7.5; 325 MG/1; MG/1
1 TABLET ORAL 3 TIMES DAILY PRN
Qty: 20 TABLET | Refills: 0 | Status: SHIPPED | OUTPATIENT
Start: 2018-10-01 | End: 2019-08-06

## 2018-10-01 RX ORDER — CEFAZOLIN SODIUM 2 G/100ML
2 INJECTION, SOLUTION INTRAVENOUS ONCE
Status: COMPLETED | OUTPATIENT
Start: 2018-10-01 | End: 2018-10-01

## 2018-10-01 RX ORDER — ROCURONIUM BROMIDE 10 MG/ML
INJECTION, SOLUTION INTRAVENOUS AS NEEDED
Status: DISCONTINUED | OUTPATIENT
Start: 2018-10-01 | End: 2018-10-01 | Stop reason: SURG

## 2018-10-01 RX ORDER — MEPERIDINE HYDROCHLORIDE 25 MG/ML
12.5 INJECTION INTRAMUSCULAR; INTRAVENOUS; SUBCUTANEOUS
Status: DISCONTINUED | OUTPATIENT
Start: 2018-10-01 | End: 2018-10-01 | Stop reason: HOSPADM

## 2018-10-01 RX ORDER — SODIUM CHLORIDE 0.9 % (FLUSH) 0.9 %
1-10 SYRINGE (ML) INJECTION AS NEEDED
Status: DISCONTINUED | OUTPATIENT
Start: 2018-10-01 | End: 2018-10-01 | Stop reason: HOSPADM

## 2018-10-01 RX ORDER — PROPOFOL 10 MG/ML
VIAL (ML) INTRAVENOUS AS NEEDED
Status: DISCONTINUED | OUTPATIENT
Start: 2018-10-01 | End: 2018-10-01 | Stop reason: SURG

## 2018-10-01 RX ORDER — MAGNESIUM HYDROXIDE 1200 MG/15ML
LIQUID ORAL AS NEEDED
Status: DISCONTINUED | OUTPATIENT
Start: 2018-10-01 | End: 2018-10-01 | Stop reason: HOSPADM

## 2018-10-01 RX ORDER — FAMOTIDINE 20 MG/1
20 TABLET, FILM COATED ORAL
Status: DISCONTINUED | OUTPATIENT
Start: 2018-10-01 | End: 2018-10-01

## 2018-10-01 RX ORDER — LABETALOL HYDROCHLORIDE 5 MG/ML
INJECTION, SOLUTION INTRAVENOUS AS NEEDED
Status: DISCONTINUED | OUTPATIENT
Start: 2018-10-01 | End: 2018-10-01 | Stop reason: SURG

## 2018-10-01 RX ORDER — PROMETHAZINE HYDROCHLORIDE 25 MG/1
25 TABLET ORAL ONCE AS NEEDED
Status: DISCONTINUED | OUTPATIENT
Start: 2018-10-01 | End: 2018-10-01 | Stop reason: HOSPADM

## 2018-10-01 RX ORDER — PROMETHAZINE HYDROCHLORIDE 25 MG/1
25 SUPPOSITORY RECTAL ONCE AS NEEDED
Status: DISCONTINUED | OUTPATIENT
Start: 2018-10-01 | End: 2018-10-01 | Stop reason: HOSPADM

## 2018-10-01 RX ORDER — GLYCOPYRROLATE 0.2 MG/ML
INJECTION INTRAMUSCULAR; INTRAVENOUS AS NEEDED
Status: DISCONTINUED | OUTPATIENT
Start: 2018-10-01 | End: 2018-10-01 | Stop reason: SURG

## 2018-10-01 RX ORDER — FENTANYL CITRATE 50 UG/ML
50 INJECTION, SOLUTION INTRAMUSCULAR; INTRAVENOUS
Status: DISCONTINUED | OUTPATIENT
Start: 2018-10-01 | End: 2018-10-01 | Stop reason: HOSPADM

## 2018-10-01 RX ADMIN — SODIUM CHLORIDE, POTASSIUM CHLORIDE, SODIUM LACTATE AND CALCIUM CHLORIDE: 600; 310; 30; 20 INJECTION, SOLUTION INTRAVENOUS at 09:24

## 2018-10-01 RX ADMIN — DEXAMETHASONE SODIUM PHOSPHATE 8 MG: 4 INJECTION, SOLUTION INTRAMUSCULAR; INTRAVENOUS at 11:07

## 2018-10-01 RX ADMIN — HYDROMORPHONE HYDROCHLORIDE 0.5 MG: 1 INJECTION, SOLUTION INTRAMUSCULAR; INTRAVENOUS; SUBCUTANEOUS at 13:00

## 2018-10-01 RX ADMIN — FENTANYL CITRATE 50 MCG: 50 INJECTION, SOLUTION INTRAMUSCULAR; INTRAVENOUS at 11:17

## 2018-10-01 RX ADMIN — CEFAZOLIN SODIUM 2 G: 2 INJECTION, SOLUTION INTRAVENOUS at 10:54

## 2018-10-01 RX ADMIN — FENTANYL CITRATE 50 MCG: 50 INJECTION, SOLUTION INTRAMUSCULAR; INTRAVENOUS at 10:56

## 2018-10-01 RX ADMIN — FAMOTIDINE 20 MG: 20 TABLET ORAL at 09:20

## 2018-10-01 RX ADMIN — SODIUM CHLORIDE, POTASSIUM CHLORIDE, SODIUM LACTATE AND CALCIUM CHLORIDE 9 ML/HR: 600; 310; 30; 20 INJECTION, SOLUTION INTRAVENOUS at 09:24

## 2018-10-01 RX ADMIN — FENTANYL CITRATE 50 MCG: 50 INJECTION, SOLUTION INTRAMUSCULAR; INTRAVENOUS at 12:51

## 2018-10-01 RX ADMIN — HYDROMORPHONE HYDROCHLORIDE 0.5 MG: 1 INJECTION, SOLUTION INTRAMUSCULAR; INTRAVENOUS; SUBCUTANEOUS at 12:54

## 2018-10-01 RX ADMIN — LIDOCAINE HYDROCHLORIDE 50 MG: 10 INJECTION, SOLUTION EPIDURAL; INFILTRATION; INTRACAUDAL; PERINEURAL at 10:56

## 2018-10-01 RX ADMIN — NEOSTIGMINE METHYLSULFATE 2 MG: 1 INJECTION, SOLUTION INTRAVENOUS at 11:43

## 2018-10-01 RX ADMIN — GLYCOPYRROLATE 0.4 MG: 0.2 INJECTION, SOLUTION INTRAMUSCULAR; INTRAVENOUS at 11:43

## 2018-10-01 RX ADMIN — LIDOCAINE HYDROCHLORIDE 0.5 ML: 10 INJECTION, SOLUTION EPIDURAL; INFILTRATION; INTRACAUDAL; PERINEURAL at 09:24

## 2018-10-01 RX ADMIN — ONDANSETRON 4 MG: 2 INJECTION INTRAMUSCULAR; INTRAVENOUS at 11:43

## 2018-10-01 RX ADMIN — SODIUM CHLORIDE, POTASSIUM CHLORIDE, SODIUM LACTATE AND CALCIUM CHLORIDE: 600; 310; 30; 20 INJECTION, SOLUTION INTRAVENOUS at 11:52

## 2018-10-01 RX ADMIN — LABETALOL HYDROCHLORIDE 5 MG: 5 INJECTION, SOLUTION INTRAVENOUS at 11:16

## 2018-10-01 RX ADMIN — PROPOFOL 150 MG: 10 INJECTION, EMULSION INTRAVENOUS at 10:56

## 2018-10-01 RX ADMIN — FENTANYL CITRATE 50 MCG: 50 INJECTION, SOLUTION INTRAMUSCULAR; INTRAVENOUS at 12:37

## 2018-10-01 RX ADMIN — ROCURONIUM BROMIDE 30 MG: 10 SOLUTION INTRAVENOUS at 10:56

## 2018-10-01 RX ADMIN — HYDROMORPHONE HYDROCHLORIDE 0.5 MG: 1 INJECTION, SOLUTION INTRAMUSCULAR; INTRAVENOUS; SUBCUTANEOUS at 13:09

## 2018-10-01 NOTE — ANESTHESIA POSTPROCEDURE EVALUATION
Patient: Jewels Venegas    Procedure Summary     Date:  10/01/18 [facility]    Anesthesia Start:  1053 Anesthesia Stop:      Procedure:  SPINAL CORD STIMULATOR INSERTION PHASE 1  (N/A Back) Diagnosis:       Lumbar stenosis with neurogenic claudication      (Lumbar stenosis with neurogenic claudication [M48.062])    Surgeon:  Vince Olivas MD Provider:  Shravan Ferro MD    Anesthesia Type:  general ASA Status:  2          Anesthesia Type: general  Last vitals  BP   (!) 187/89 (10/01/18 1217)   Temp   97.5 °F (36.4 °C) (10/01/18 1217)   Pulse   84 (10/01/18 1217)   Resp   16 (10/01/18 1217)     SpO2   94 % (10/01/18 1217)     Post Anesthesia Care and Evaluation    Patient location during evaluation: PACU  Patient participation: complete - patient participated  Level of consciousness: awake and alert  Pain score: 0  Pain management: adequate  Airway patency: patent  Anesthetic complications: No anesthetic complications  PONV Status: none  Cardiovascular status: hemodynamically stable and acceptable  Respiratory status: nonlabored ventilation, acceptable and nasal cannula  Hydration status: acceptable

## 2018-10-01 NOTE — ANESTHESIA PROCEDURE NOTES
Airway  Urgency: elective      General Information and Staff    Patient location during procedure: OR  CRNA: RYAN HULL    Indications and Patient Condition  Indications for airway management: airway protection    Preoxygenated: yes  MILS not maintained throughout  Mask difficulty assessment: 0 - not attempted    Final Airway Details  Final airway type: endotracheal airway      Successful airway: ETT  Cuffed: yes   Successful intubation technique: direct laryngoscopy  Endotracheal tube insertion site: oral  Blade: Doherty  Blade size: 2  ETT size: 7.0 mm  Cormack-Lehane Classification: grade IIa - partial view of glottis  Placement verified by: chest auscultation and capnometry   Cuff volume (mL): 8  Measured from: lips  ETT to lips (cm): 21  Number of attempts at approach: 1

## 2018-10-01 NOTE — INTERVAL H&P NOTE
Pre-Op H&P (See Recent Office Note Attached for Full H&P)    Chief complaint: Back and bilateral LE pain    Review of Systems:  General ROS:  no fever, chills, rashes, No change since last office visit  Cardiovascular ROS: no chest pain or dyspnea on exertion.  History of new murmur.  Seen by pcp with plans for baseline TTE following surgery.  No activity intolerance/presyncope/syncope or edema  Respiratory ROS: no cough, shortness of breath, or wheezing    Meds:    No current facility-administered medications on file prior to encounter.      Current Outpatient Prescriptions on File Prior to Encounter   Medication Sig Dispense Refill   • amLODIPine (NORVASC) 10 MG tablet Take 10 mg by mouth Daily.     • Biotin (BIOTIN 5000) 5 MG capsule Take  by mouth.     • Coenzyme Q10 (CO Q 10) 100 MG capsule Take 1 tablet by mouth Daily.     • Cyanocobalamin (B-12-SL SL) Place 1 tablet under the tongue Daily.     • Dermatological Products, Misc. (EPICERAM) lotion Apply twice daily to back (Patient taking differently: Apply 1 application topically to the appropriate area as directed 2 (Two) Times a Day With Meals. Apply twice daily to back) 225 g 0   • Dietary Management Product (RHEUMATE) capsule Take 1 capsule once daily (Patient taking differently: Take 1 tablet by mouth Daily. Take 1 capsule once daily) 30 capsule 5   • fluticasone (FLONASE) 50 MCG/ACT nasal spray 2 sprays into each nostril As Needed for Rhinitis.     • Krill Oil 500 MG capsule Take 1 tablet by mouth Daily.     • levothyroxine (SYNTHROID, LEVOTHROID) 112 MCG tablet Take 112 mcg by mouth Daily.     • methocarbamol (ROBAXIN) 750 MG tablet TAKE 1 TABLET TWICE DAILY 120 tablet 1   • Multiple Vitamins-Minerals (MULTIVITAMIN WITH MINERALS) tablet tablet Take 1 tablet by mouth Daily.     • pyridoxine (VITAMIN B-6) 25 MG tablet Take 1 tablet by mouth 3 (Three) Times a Day. 90 tablet 5   • quinapril-hydrochlorothiazide (ACCURETIC) 20-25 MG per tablet Take 1 tablet by  "mouth Daily.     • traMADol (ULTRAM) 50 MG tablet Take 1 tablet by mouth Every 6 (Six) Hours As Needed for Moderate Pain . 60 tablet    • vitamin D (ERGOCALCIFEROL) 63973 units capsule capsule Take 50,000 Units by mouth 1 (One) Time Per Week.     • aspirin 81 MG EC tablet Take 81 mg by mouth Daily. Stopped in mid Aug 2018     • atorvastatin (LIPITOR) 20 MG tablet Take 20 mg by mouth 2 (Two) Times a Week. MON AND THURS         Vital Signs:  /77 (BP Location: Right arm, Patient Position: Lying)   Pulse 83   Temp 98.8 °F (37.1 °C) (Tympanic)   Resp 16   Ht 168.9 cm (66.5\")   Wt 90.7 kg (200 lb)   SpO2 98%   BMI 31.80 kg/m²     Physical Exam:    CV:  S1S2 regular rate and rhythm, 1/6 systolic murmur without radiation to carotids               Resp:  Clear to auscultation; respirations regular, even and unlabored    Results Review:    I reviewed the patient's new clinical results.    Cancer Staging (if applicable)  Cancer Patient: __ yes _x_no __unknown; If yes, clinical stage T:__ N:__M:__, stage group or __N/A    Assessment/Plan:    Diagnosis:   Lumbar spondylosis.     Treatment Options:   Dr. Gale has requested St. Andrés epidural spinal cord stimulator placement with the Penta lead projecting to the superior T7 level.  The nature of the procedure as well as the potential risks, complications, limitations, and alternatives to the procedure were discussed at length with the patient and the patient has agreed to proceed with surgery.  We will avoid the use of tape on her dressings postoperatively.    Corina Ventura, APRN  10/1/2018   9:36 AM    "

## 2018-10-01 NOTE — H&P (VIEW-ONLY)
Patient: Jewels Venegas  : 1940     Primary Care Provider: Renée Multani MD     Requesting Provider: As above           History     Chief Complaint: Back and bilateral lower extremity pain.     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  She underwent multilevel lumbar laminectomy by my colleague Dr. Reid a number of years ago and did quite well for about a year.  She's developed increasing back pain that extends into both legs, right greater than left.  She is very intolerant of standing and walking.  She does better when off her feet.  The pain extends into the inner thighs and then into the lateral calves.  She's had some intermittent numbness in her legs as well.  A recent trial of a [place]. Andrés epidural spinal cord stimulator provided about 90% relief for her pain.  She developed some blistering that may well have been related to some of the tape that was used on her bandage.     Review of Systems   Constitutional: Negative for activity change, appetite change, chills, diaphoresis, fatigue, fever and unexpected weight change.   HENT: Positive for postnasal drip. Negative for congestion, dental problem, drooling, ear discharge, ear pain, facial swelling, hearing loss, mouth sores, nosebleeds, rhinorrhea, sinus pressure, sneezing, sore throat, tinnitus, trouble swallowing and voice change.    Eyes: Negative for photophobia, pain, discharge, redness, itching and visual disturbance.   Respiratory: Negative for apnea, cough, choking, chest tightness, shortness of breath, wheezing and stridor.    Cardiovascular: Negative for chest pain, palpitations and leg swelling.   Gastrointestinal: Negative for abdominal distention, abdominal pain, anal bleeding, blood in stool, constipation, diarrhea, nausea, rectal pain and vomiting.   Endocrine: Negative for cold intolerance, heat intolerance, polydipsia, polyphagia and polyuria.   Genitourinary: Positive  for urgency. Negative for decreased urine volume, difficulty urinating, dysuria, enuresis, flank pain, frequency, genital sores and hematuria.   Musculoskeletal: Positive for back pain and gait problem. Negative for arthralgias, joint swelling, myalgias, neck pain and neck stiffness.   Skin: Negative for color change, pallor, rash and wound.   Allergic/Immunologic: Positive for environmental allergies. Negative for food allergies and immunocompromised state.   Neurological: Positive for numbness. Negative for dizziness, tremors, seizures, syncope, facial asymmetry, speech difficulty, weakness, light-headedness and headaches.   Hematological: Negative for adenopathy. Does not bruise/bleed easily.   Psychiatric/Behavioral: Negative for agitation, behavioral problems, confusion, decreased concentration, dysphoric mood, hallucinations, self-injury, sleep disturbance and suicidal ideas. The patient is not nervous/anxious and is not hyperactive.          The patient's past medical history, past surgical history, family history, and social history have been reviewed at length in the electronic medical record.     Past Medical History:   Diagnosis Date   • Arthritis    • Back problem    • Hypertension    • Osteoporosis    • Thyroid disease      Past Surgical History:   Procedure Laterality Date   • APPENDECTOMY  1977   • CHOLECYSTECTOMY     • LUMBAR LAMINECTOMY  06/2015    Dr. Domenico Reid, [facility] L2-5     Family History   Problem Relation Age of Onset   • Cancer Father    • Heart disease Father    • Breast cancer Neg Hx      Social History     Social History   • Marital status:      Spouse name: N/A   • Number of children: N/A   • Years of education: N/A     Occupational History   • Not on file.     Social History Main Topics   • Smoking status: Former Smoker     Quit date: 1982   • Smokeless tobacco: Never Used   • Alcohol use No   • Drug use: No   • Sexual activity: Defer     Other Topics  "Concern   • Not on file     Social History Narrative   • No narrative on file     Allergies   Allergen Reactions   • Betadine [Povidone Iodine] Rash     Itching, and blisters   • Adhesive Tape Rash   • Codeine Itching       Physical Exam:   Temp 98.8 °F (37.1 °C) (Temporal Artery )   Ht 170.2 cm (67\")   Wt 90.3 kg (199 lb)   BMI 31.17 kg/m²   CONSTITUTIONAL: Patient is well-nourished, pleasant and appears stated age.  CV: Heart regular rate and rhythm without murmur, rub, or gallop.  PULMONARY: Lungs are clear to ascultation.  MUSCULOSKELETAL:  Straight leg raising is negative.  Sean's Sign is negative.  ROM in back is normal.  Tenderness in the back to palpation is not observed.  NEUROLOGICAL:  Orientation, memory, attention span, language function, and cognition have been examined and are intact.  Strength is intact in the lower extremities to direct testing.  Muscle tone is normal throughout.  Station and gait are normal.  Sensation is intact to light touch testing throughout except the lateral aspect of her right calf where it is diminished.  Deep tendon reflexes are 1+ and symmetrical.  Coordination is intact.        Medical Decision Making     Data Review:   Lumbar myelogram does not reveal significant root compromise although there is diffuse spondylosis.    Her thoracic MRI demonstrates some diffuse spondylosis but I don't see a high-grade lesion that would preclude lead placement.     Diagnosis:   Lumbar spondylosis.     Treatment Options:   Dr. Gale has requested St. Andrés epidural spinal cord stimulator placement with the Penta lead projecting to the superior T7 level.  The nature of the procedure as well as the potential risks, complications, limitations, and alternatives to the procedure were discussed at length with the patient and the patient has agreed to proceed with surgery.  We will avoid the use of tape on her dressings postoperatively.          Diagnosis Plan   1. Post laminectomy " syndrome      2. Spinal stenosis of lumbar region with neurogenic claudication

## 2018-10-01 NOTE — OP NOTE
NEUROSURGICAL OPERATIVE NOTE        PREOPERATIVE DIAGNOSIS:    Lumbar post-laminectomy syndrome      POSTOPERATIVE DIAGNOSIS:  Same      PROCEDURE:  T9 laminotomy for placement of St. Andrés epidural spinal cord stimulator      SURGEON:  Vince Olivas M.D.      ASSISTANT: Shannon Martinez PA-C      ANESTHESIA:  General      ESTIMATED BLOOD LOSS:  Minimal      SPECIMEN:  None      DRAINS:  None      COMPLICATIONS:  None      CLINICAL NOTE:  The patient is a 78-year-old woman with a protracted history of back and bilateral lower extremity pain.  Remotely she underwent lumbar decompression.  She continues to have severe pain involving her back and legs.  A recent trial of a spinal cord stimulator provided substantial improvement of her symptoms and as such she presents at this time for St. Andrés epidural spinal cord stimulator.  The nature of the procedure as well as the potential risks, complications, limitations, and alternatives to the procedure were discussed at length with the patient and the patient has agreed to proceed with surgery.      TECHNICAL NOTE:  The patient was brought to the operating room. While on her cart, general endotracheal anesthesia was achieved. She was then turned prone onto blanket rolls maintained longitudinally under her chest and abdomen. Special care was ensured to protect pressure points. Her mid-back and flanks were prepared and draped in the usual fashion. A localizing radiograph was obtained using AP fluoroscopy and counting up from the 12th rib the T9-T10 level was localized. Midline incision was fashioned to expose the posterior spinal elements and another radiographic confirmed the operative level. Leksell rongeur and drill as well as Kerrison punches were utilized to fashion a central laminotomy at the inferior T9 level. Bleeding points were controlled with bone wax and Floseal was also utilized. The dorsal epidural space was defined in a cephalad fashion using a Penfield 3.  The [place]. Andrés Penta lead was ultimately advanced in several passes, ultimately into the midline projecting about three-quarters of the way up on the T7 vertebral body. This was secured in place with lead tethers. A transverse incision was then made on the left flank. A subcutaneous pocket was fashioned. Lead passer was utilized to pass the lead wires from the thoracic incision to the flank incision. The lead wires were affixed to the [place]. Andrés IPG. Impedances were appropriate. Vancomycin powder was sprinkled in the pocket. The IPG was placed in the pocket and secured in place with 3-0 silk sutures. The subcutaneous tissues were closed in interrupted fashion in a single layer with 3-0 Vicryl suture. The thoracic incision was washed out with an antibiotic containing solution and some vancomycin power was sprinkled in the depths and more superficially as the wound was closed. Paraspinous muscle and fascia were reapproximated in interrupted fashion with 0 Vicryl suture. Subcutaneous tissues were closed in layers with 3-0 Vicryl suture. The skin at each site was closed in a running subcuticular fashion with 3-0 Vicryl suture. Sterile dressings were applied. Placement fluoroscopy confirmed good position of the lead as noted above.             Vince Olivas M.D.

## 2018-10-01 NOTE — ANESTHESIA PREPROCEDURE EVALUATION
Anesthesia Evaluation     Patient summary reviewed and Nursing notes reviewed                Airway   Mallampati: I  TM distance: >3 FB  Neck ROM: full  No difficulty expected  Dental    (+) upper dentures    Pulmonary    (+) a smoker Former,   Cardiovascular     ECG reviewed    (+) hypertension,   (-) past MI, CAD, dysrhythmias, angina, CHF, cardiac stents, CABG    ROS comment: EKG NSR     Neuro/Psych  (+) TIA (or vertigo [facility] work up neg ), numbness,     (-) seizures, CVA    ROS Comment: Benign post vertigo  spondy neurpgenic claudic sp( stenosis   GI/Hepatic/Renal/Endo    (-) liver disease, no renal disease, diabetes, hypothyroidism    Musculoskeletal         ROS comment: post lami syndrome   Abdominal    Substance History      OB/GYN          Other   (+) arthritis                     Anesthesia Plan    ASA 2     general   (Propofol )  intravenous induction   Anesthetic plan, all risks, benefits, and alternatives have been provided, discussed and informed consent has been obtained with: patient.    Plan discussed with CRNA.

## 2018-10-17 ENCOUNTER — OFFICE VISIT (OUTPATIENT)
Dept: NEUROSURGERY | Facility: CLINIC | Age: 78
End: 2018-10-17

## 2018-10-17 VITALS — HEIGHT: 66 IN | WEIGHT: 194 LBS | BODY MASS INDEX: 31.18 KG/M2 | TEMPERATURE: 98 F

## 2018-10-17 DIAGNOSIS — M96.1 POST LAMINECTOMY SYNDROME: Primary | ICD-10-CM

## 2018-10-17 DIAGNOSIS — Z96.89 S/P INSERTION OF SPINAL CORD STIMULATOR: ICD-10-CM

## 2018-10-17 PROCEDURE — 99024 POSTOP FOLLOW-UP VISIT: CPT | Performed by: PHYSICIAN ASSISTANT

## 2018-10-17 NOTE — PROGRESS NOTES
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  More recently, she developed increasing low back pain that extended into her bilateral lower extremities - affecting her right greater than her left.  A recent trial of a [employer]. Andrés epidural spinal cord similar provided approximately 90% relief of her pain.  As such, patient presented for T9 laminotomy for placement of St. Andrés epidural spinal cord stimulator on 10/1/18.    Presently, Ms. Venegas is 2 weeks postop.  She is very pleased with her current postoperative progress.  She states that she experienced increased abdominal discomfort for ~14 hours postoperatively, however after the 14 hour duration symptoms resolved.  Since the insertion of the stimulator, her pain has substantially improved. Pt. also noticed an improvement in her urinary frequency.  Patient has a follow-up appointment with Dr. Gale next week.  She has no other complaints at this time.    Review of Systems   Constitutional: Negative for activity change, appetite change, chills, diaphoresis, fatigue, fever and unexpected weight change.   HENT: Positive for postnasal drip. Negative for congestion, dental problem, drooling, ear discharge, ear pain, facial swelling, hearing loss, mouth sores, nosebleeds, rhinorrhea, sinus pressure, sneezing, sore throat, tinnitus, trouble swallowing and voice change.    Eyes: Negative for photophobia, pain, discharge, redness, itching and visual disturbance.   Respiratory: Negative for apnea, cough, choking, chest tightness, shortness of breath, wheezing and stridor.    Cardiovascular: Negative for chest pain, palpitations and leg swelling.   Gastrointestinal: Positive for abdominal pain. Negative for  "abdominal distention, anal bleeding, blood in stool, constipation, diarrhea, nausea, rectal pain and vomiting.   Endocrine: Negative for cold intolerance, heat intolerance, polydipsia, polyphagia and polyuria.   Genitourinary: Positive for flank pain. Negative for decreased urine volume, difficulty urinating, dysuria, enuresis, frequency, genital sores, hematuria and urgency.   Musculoskeletal: Negative for arthralgias, back pain, gait problem, joint swelling, myalgias, neck pain and neck stiffness.   Skin: Negative for color change, pallor, rash and wound.   Allergic/Immunologic: Negative for environmental allergies, food allergies and immunocompromised state.   Neurological: Positive for numbness. Negative for dizziness, tremors, seizures, syncope, facial asymmetry, speech difficulty, weakness, light-headedness and headaches.   Hematological: Negative for adenopathy. Does not bruise/bleed easily.   Psychiatric/Behavioral: Negative for agitation, behavioral problems, confusion, decreased concentration, dysphoric mood, hallucinations, self-injury, sleep disturbance and suicidal ideas. The patient is not nervous/anxious and is not hyperactive.    All other systems reviewed and are negative.      The patient's past medical history, past surgical history, family history, and social history have been reviewed at length in the electronic medical record.    Physical Exam:   Temp 98 °F (36.7 °C) (Temporal Artery )   Ht 168.9 cm (66.5\")   Wt 88 kg (194 lb)   BMI 30.85 kg/m²   Consitutional: A&Ox3, pleasant, no acute distress  Skin:   - Well healed surgical incision   - No evidence of wound dehiscence  - NSOI   - Non-TTP    Medical Decision Making      Diagnosis/Treatment Options:  1. Post laminectomy syndrome  2. S/P insertion of spinal cord stimulator       Follow up:    Dayan Martinez PA-C   10/17/2018   3:40 PM   "

## 2018-10-18 NOTE — PROGRESS NOTES
"Chief Complaint: \"I am doing well with the stimulator device.I need more coverage in my lower back.\"    Brief History: Mrs. Jewels Venegas is a 78 y.o. female, who underwent implantation of a spinal cord stimulator device with Dr. Vince Olivas on 10/01/2018 with [employer] Jude[employer] Andrés Penta paddle lead (Full Body MRI compatible)  with the top electrodes projecting at the level of the superior endplate of the T7 vertebral level. IPG: [employer] Proclaim 7 IPG Non-Rechargeable (Full Body MRI compatible)    The device was implanted primarily for treatment of lower back and lower extremity pain. Patient returns to the clinic for evaluation of and possible spinal cord stimulator reprogramming. Patient has remained afebrile and surgical wounds are well healed and without erythema, drainage, or fluid accumulation. Patient denies incisional pain. Patient reports that she experienced some abdominal pain after surgery that has been resolving.  Ms. Jewels Venegas reports 70% relief along with remarkable functional improvement with the use of her stimulator device. Patient reports significant relief of her previously severe neurogenic claudication. Her urinary urgency has decreased remarkably from using 18 Depends a day to 3 a day.  Pain level is rated as 1/10 with the stimulator \"turned on.”  Patient level ranges from 0/10 to 1/10 with the use of the spinal cord stimulator device.    Patient denies pain, numbness and weakness in the lower extremities.  Patient denies  any new bladder or bowel problems.     Pain History Prior to SCS Trial:  Pain history: Patient reports a 6-year history of pain, which began without incident. Patient underwent multilevel laminectomies L2-L4 in 2015 with Dr. Reid for neurogenic claudication and did exceedingly well with resolution of her symptoms until about 2016. Pain has progressed in intensity over the past 1 year.   Pain description: constant lower back pain with intermittent exacerbation, " described as crushing, sharp, pressure, and stabbing sensation.   Radiation of pain: The lower back pain radiates into the lateral and anterior aspect of the thighs, the lateral aspect of the shins, and into the dorsum of her feet  Pain intensity today: 3/10 (sitting) standing or walking (6/10)  Average pain intensity last week: 4/10  Pain intensity ranges from: 1/10 to 9/10  Aggravating factors: Pain increases with standing longer than 1 minutes and ambulating with her cane or walker more than 5-10 minutes.  Alleviating factors: Pain decreases with sitting, lying down, heat and analgesics.   Associated symptoms:   Patient reports pain, numbness and weakness in the lower extremities, worse on the right side.  Patient denies any new bladder or bowel problems. Patient has a history of chronic bladder incontinence.   Patient reports difficulties with her balance but denies recent falls.     Review of previous therapies and additional medical records:  Jewels Venegas has already failed the following measures, including:   Conservative measures: oral analgesics, topical analgesics, physical therapy, ice and heat   Interventional measures: Patient underwent three Racz procedures with , her last procedure was in April. She reports no long-lasting relief from those procedures.  Surgical measures: Patient underwent L2-L4 laminectomies and medial factectomies with  on 06/24/2015. Patient experienced 100% pain relief from her surgery for over 1 year.   Jewels Venegas underwent follow-up neurosurgical consultation with Dr. Reid on 09/26/2017, and was found not to be a surgical candidate.  Patient underwent psychological evaluation with Dr. Coral melendez and has been found to be an appropriate candidate for a spinal cord stimulation  Jewels Venegas presents with significant comorbidities including insomnia, not engaged in treatment, hypertension, osteoporosis engaged in treatment.  In terms of current  analgesics, Jewels Venegas takes: Robaxin, tramadol   I have reviewed Bishop Report #[id number] consistent to medication reconciliation.    Global Pain Scale  07-31-18  08-23-18 10-25-18              Pain  14  6  4             Feelings  3  0  0             Clinical outcomes  11  1  10             Activities  13  1  1             GPS Total:  41  8  5                Diagnostic Studies:    EMG/NCV of both lower extremities 10/04/2017: Sensorimotor neuropathy, axonal, moderate. 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 There is a minimal dextroscoliotic curvature of the lumbar spine apex L4 with slight rotatory component at the L4-L5 juncture. Multilevel osteophyte formation with bridging anteriorly of the L2-L3, L3-L4, L4-L5 and L5-S1 levels. Facets are well aligned with facet hypertrophy noted in the lower lumbar segments. Preservation of vertebral body heights. Intervertebral disc space heights are decreased at essentially all levels but most significant at the L2-L3, L3-L4, L4-L5 and L5-S1 levels where there is disc desiccation and associated degenerative change of the surrounding endplates. Axial datasets with level by level evaluation as follows:  L1-L2: Severely limited evaluation due to poor thecal sac opacification. No gross thecal sac contour irregularity.  L2-L3: Disc osteophyte complex with associated circumferential disc bulge with concomitant facet hypertrophy producing moderate to severe thecal sac effacement with moderate bilateral nerve root sleeve effacement.  L3-L4: Disc osteophyte complex with associated disc bulge with left lateralizing component involving the left subarticular recess and left neural foramina producing moderate thecal sac effacement with moderate to severe left nerve root sleeve effacement.  L4-L5: Disc osteophyte complex with disc bulge containing far right lateral and right  paracentral components producing mild to moderate thecal sac effacement with mild left and total right nerve root sleeve effacement.  L5-S1: Disc osteophyte complex with circumferential disc bulge component producing mild thecal sac effacement with moderate right nerve root sleeve effacement.  IR MYELOGRAM LUMBAR SPINE-, XR SPINE LUMBAR FLEX AND EXT: There are postoperative changes consistent with a laminectomy at the L2-L5 levels. Moderate diffuse arthropathy of the lumbar spine with disc space narrowing, endplate disease, and osteophyte formation. There is a grade 1 retrolisthesis of L2 on L3 which measures approximate 10 mm in the flexed, and extended positions. There was good filling of the thecal sac. No intrathecal mass was identified. The nerve root sleeves appear bilaterally symmetric. There is mild anterior impression on thecal sac at the L2-L3, L3-L4, and L4-L5 levels. Please correlate with CT imaging.      MRI of the lumbar spine July 31, 2017 revealed retrolisthesis of L2 on L3. Grade 1 anterior spondylolisthesis of L5 on S1. Vertebral body height is preserved. There is multilevel disc desiccation with disc space narrowing. There is normal signal intensity in the cervical cord.  The spinal cord ends at the level of L1-L2.  Disc levels:  L1-L2: Mild annular disc bulge with no central or significant neuroforaminal stenosis  L2-L3: Broad-based disc osteophyte complex.  No central canal stenosis. Moderate bilateral neuroforaminal stenosis.  L3-L4: Right paracentral protrusion.  Disc material a sense along the anterior aspect of the central canal and along the posterior aspect of the inferior endplate of L3.  This is superimposed on this osteophyte complex.  There is no central stenosis.  There is severe bilateral neuroforaminal stenosis.  L4-L5: Broad-based disc osteophyte complex.  There is no central canal stenosis.  Bilateral neuroforaminal stenosis.  L5-S1: Disc osteophyte complex.  There is no central  "canal stenosis. Moderate to severe right and mild left neuroforaminal stenosis.    The following portions of the patient's history were reviewed and updated as appropriate: problem list, past medical history, past surgery history, social history, family history, medications, and allergies    Review of Systems   All other systems reviewed and are negative.    /80   Pulse 110   Temp 97.8 °F (36.6 °C) (Temporal Artery )   Resp 18   Ht 168.9 cm (66.5\")   Wt 88.4 kg (194 lb 12.8 oz)   SpO2 99%   BMI 30.97 kg/m²       Physical Exam   Neurologic Exam  Constitutional: Patient is oriented to person, place, and time. Patient appears well-developed and well-nourished.   HEENT: Head: Normocephalic and atraumatic. Eyes: Conjunctivae and lids are normal. Pupils: Equal, round, reactive to light.   Neck: Trachea normal. Neck supple. No JVD present.   Pulmonary Respiratory effort: No increased work of breathing or signs of respiratory distress. Auscultation of lungs: Clear to auscultation.   Cardiovascular Auscultation of heart: Normal rate and rhythm, normal S1 and S2, no murmurs.   Peripheral vascular exam: Femoral: right 2+, left 2+. Posterior tibialis: right 2+ and left 2+. Dorsalis pedis: right 2+ and left 2+. No edema.   Abdomen: The abdomen was soft and nontender. Bowel sounds were normal.   Musculoskeletal   Gait and station: Gait evaluation demonstrated shuffling   Lumbar spine: The range of motion of the lumbar spine is limited secondary to pain. Extension of the lumbar spine increased and reproduced pain. Lumbar facet joint loading maneuvers are positive.  Sean and Gaenslen's tests are negative   Piriformis maneuvers are negative   Palpation of the bilateral ischial tuberosities, unrevealing   Palpation of the bilateral greater trochanter, unrevealing   Examination of the Iliotibial band: unrevealing   Hip joints: The range of motion of the hip joints is full and without pain   Neurological: Patient is " alert and oriented to person, place, and time. Speech: speech is normal. Cortical function: Normal mental status.   Cranial nerves: Cranial nerves 2-12 intact.   Reflex Scores: Right patellar: 1+ Left patellar: 1+ Right achilles: 1+ Left achilles: 1+. Motor strength: 5/5. Negative long tract signs. Straight leg raising test is negative. Femoral stretch sign is negative.   Sensation: No sensory loss. Sensory exam: intact to light touch, intact pain and temperature sensation, intact vibration sensation and normal proprioception.   Coordination: Normal finger to nose and heel to shin. Normal balance and negative. Romberg's sign negative.   Skin and subcutaneous tissue: Skin is warm and intact. No rash noted. No cyanosis.   Psychiatric: Judgment and insight: Normal. Orientation to person, place and time: Normal. Recent and remote memory: Intact. Mood and affect: Normal.     PROCEDURE: Analysis of the spinal cord stimulator device with complex spinal cord stimulator reprogramming   Patient used the [employer] Jude spinal cord stimulator device for 576 hours since last reprogramming, and 576 lifetime hours (average 24 hours per day). Analysis of impedence reveals normal impedence for all contacts.   The spinal cord stimulator device was reprogrammed under my direct supervision and two programs were created by adjusting electrode polarities, amplitude, pulse width,  pulse rate, BurstDR, micro-dosing, in the following fashion;    Program TWO (Best Program):    Electrode polarities: 7-, 10+   Amplitude: 0.05-1 mA     Pulse width: 1000 mcs  Rate: 40 Hz  IntraBurst rate: 500 Hz  Micro-dosin-90    Patient experienced significant pain relief with coverage with pleasant paresthesia in all areas of chronic pain.  Time spent reprogramming: 15 minutes  A copy of the telemetry report will be scanned in the patient's chart.    ASSESSMENT:   1. Post laminectomy syndrome    2. Lumbar stenosis with neurogenic claudication    3.  Polyneuropathy    4. Other osteoarthritis of spine, lumbar region    5. Benign paroxysmal positional vertigo, unspecified laterality    6. Osteoporosis, unspecified osteoporosis type, unspecified pathological fracture presence    7. Insomnia due to medical condition    8. At high risk for falls    9. Physical deconditioning    10. S/P insertion of spinal cord stimulator    11. Encounter for fitting and adjustment of neuropacemaker of spinal cord        PLAN: Patient's chronic pain condition has been significantly improved since implantation of her SCS device. I had a lengthy conversation with Ms.Jewels Venegas regarding her chronic pain condition and potential therapeutic options including risks, benefits, alternative therapies, to name a few. Patient is very satisfied with the outcome of her spinal cord stimulator device. Therefore, I have proposed the following plan:  1. Follow up in six weeks for possible SCS reprogramming.   2. Pharmacological measures: Reviewed. Discussed.   A. Patient takes Robaxin, tramadol.   B. Continue Rheumate one tablet daily  C. Continue pyridoxine 25 mg one tablet by mouth three times daily  3.  Long-term rehabilitation efforts:  A. The patient does not have a history of falls. I did complete a risk assessment for falls. Fall precautions: Patient has been instructed regarding universal fall precautions, such as;   · Using gait aids a  cane or a rolling walker at the appropriate height at all times for ambulation   · Removing all area rugs and coffee tables to create a safe environment at home  · Ensure clean, dry floors  · Wearing supportive footwear and properly fitting clothing  · Ensure bed/chair is appropriate height and patient's feet can touch the floor  · Using a shower transfer bench  · Using walk-in shower and having shower safety bars installed  · Ensure proper lighting, minimize glare  · Have nightlights operational and in use  · Participation in an exercise program for gait  training, balance training and strength  · Avoid carrying laundry up and down steps  · Ensure proper compliance and organization of medications to avoid errors   · Avoid use of over the counter sedatives and alcohol consumption  · Ensure easy access to call bell, glasses, TV control, telephone  · Ensure glasses/hearing aids are in use or close by (on top of night table)  B.  Start a comprehensive physical therapy program for water therapy, therapeutic exercise, core strengthening, and vestibular rehabilitation in about 1 week  C.  Start an exercise program such as water therapy  D.  Contrast therapy: Apply ice-packs for 15-20 minutes, followed by heating pads for 15-20 minutes to affected area   4.  The patient has been instructed to contact my office with any questions or difficulties. The patient understands the plan and agrees to proceed accordingly.    Patient Care Team:  Renée Multani MD as PCP - General  Renée Multani MD as PCP - Family Medicine  Apro, Juju Pryor PA-C as Physician Assistant (Physician Assistant)  Doron Gale MD as Consulting Physician (Pain Medicine)  Domenico Reid MD as Consulting Physician (Neurosurgery)     No orders of the defined types were placed in this encounter.        No future appointments.      Doron Gale MD    EMR Dragon/Transcription disclaimer:  Much of this encounter note is an electronic transcription of spoken language to printed text. Electronic transcription of spoken language may permit erroneous, or at times, nonsensical words or phrases to be inadvertently transcribed. Although I have reviewed the note for such errors, some may still exist.

## 2018-10-24 ENCOUNTER — TELEPHONE (OUTPATIENT)
Dept: PAIN MEDICINE | Facility: CLINIC | Age: 78
End: 2018-10-24

## 2018-10-24 NOTE — TELEPHONE ENCOUNTER
Patient was original on the schedule for 230 however the appt time was changed to 1:05 an the patient wasn't notified until televox called to confirm her appt and there was no documentation she was contacted. She can not come until 230.   I have moved her back to 230 on the schedule.

## 2018-10-25 ENCOUNTER — OFFICE VISIT (OUTPATIENT)
Dept: PAIN MEDICINE | Facility: CLINIC | Age: 78
End: 2018-10-25

## 2018-10-25 VITALS
HEART RATE: 110 BPM | WEIGHT: 194.8 LBS | RESPIRATION RATE: 18 BRPM | OXYGEN SATURATION: 99 % | TEMPERATURE: 97.8 F | DIASTOLIC BLOOD PRESSURE: 80 MMHG | SYSTOLIC BLOOD PRESSURE: 140 MMHG | HEIGHT: 67 IN | BODY MASS INDEX: 30.57 KG/M2

## 2018-10-25 DIAGNOSIS — G62.9 POLYNEUROPATHY: ICD-10-CM

## 2018-10-25 DIAGNOSIS — M47.896 OTHER OSTEOARTHRITIS OF SPINE, LUMBAR REGION: ICD-10-CM

## 2018-10-25 DIAGNOSIS — M48.062 LUMBAR STENOSIS WITH NEUROGENIC CLAUDICATION: ICD-10-CM

## 2018-10-25 DIAGNOSIS — M96.1 POST LAMINECTOMY SYNDROME: ICD-10-CM

## 2018-10-25 DIAGNOSIS — G47.01 INSOMNIA DUE TO MEDICAL CONDITION: ICD-10-CM

## 2018-10-25 DIAGNOSIS — R53.81 PHYSICAL DECONDITIONING: ICD-10-CM

## 2018-10-25 DIAGNOSIS — M96.1 POST LAMINECTOMY SYNDROME: Primary | ICD-10-CM

## 2018-10-25 DIAGNOSIS — H81.10 BENIGN PAROXYSMAL POSITIONAL VERTIGO, UNSPECIFIED LATERALITY: ICD-10-CM

## 2018-10-25 DIAGNOSIS — Z91.81 AT HIGH RISK FOR FALLS: ICD-10-CM

## 2018-10-25 DIAGNOSIS — Z46.2 ENCOUNTER FOR FITTING AND ADJUSTMENT OF NEUROPACEMAKER OF SPINAL CORD: ICD-10-CM

## 2018-10-25 DIAGNOSIS — M81.0 OSTEOPOROSIS, UNSPECIFIED OSTEOPOROSIS TYPE, UNSPECIFIED PATHOLOGICAL FRACTURE PRESENCE: ICD-10-CM

## 2018-10-25 DIAGNOSIS — Z96.89 S/P INSERTION OF SPINAL CORD STIMULATOR: ICD-10-CM

## 2018-10-25 PROCEDURE — 95972 ALYS CPLX SP/PN NPGT W/PRGRM: CPT | Performed by: ANESTHESIOLOGY

## 2018-10-25 PROCEDURE — 99213 OFFICE O/P EST LOW 20 MIN: CPT | Performed by: ANESTHESIOLOGY

## 2018-12-10 NOTE — PROGRESS NOTES
"Chief Complaint: \"I am doing well with the stimulator device.I need more coverage in my lower back.\"      Brief History: Mrs. Jewels Venegas is a 78 y.o. female, who underwent implantation of a spinal cord stimulator device with Dr. Vince Olivas on 10/01/2018 with [employer] Jude[employer] Penta paddle lead (Full Body MRI compatible)  with the top electrodes projecting at the level of the superior endplate of the T7 vertebral level. IPG: [employer] Proclaim 7 IPG Non-Rechargeable (Full Body MRI compatible).  The device was implanted primarily for treatment of lower back and lower extremity pain. Patient returns to the clinic for evaluation of and possible spinal cord stimulator reprogramming. Patient has remained afebrile and surgical wounds are well healed and without erythema, drainage, or fluid accumulation. Patient denies incisional pain. Patient was last seen on 10/25/2018 for SCS reprogramming. Patient reports that her abdominal pain after surgery has resolved.  Ms. Jewels Venegas reports 70% relief along with remarkable functional improvement with the use of her stimulator device. Patient reports significant relief of her previously severe neurogenic claudication. Her urinary urgency has decreased remarkably from using 18 Depends a day to 3 a day.  Pain level is rated as 0/10 (sitting) 2/10 (walking) 6/10 (with activity) with the stimulator \"turned on.”  Patient level ranges from 0/10 to 6/10 with the use of the spinal cord stimulator device.    Patient denies pain, numbness or weakness in the lower extremities.  Patient denies any new bladder or bowel problems.     Pain History:  Pain history: Patient reports a 6-year history of pain, which began without incident. Patient underwent multilevel laminectomies L2-L4 in 2015 with Dr. Reid for neurogenic claudication and did exceedingly well with resolution of her symptoms until about 2016. Pain has progressed in intensity over the past 1 year.   Pain description: " constant lower back pain with intermittent exacerbation, described as crushing, sharp, pressure, and stabbing sensation.   Radiation of pain: The lower back pain radiates into the lateral and anterior aspect of the thighs, the lateral aspect of the shins, and into the dorsum of her feet  Pain intensity today: 3/10 (sitting) standing or walking (6/10)  Average pain intensity last week: 4/10  Pain intensity ranges from: 1/10 to 9/10  Aggravating factors: Pain increases with standing longer than 1 minutes and ambulating with her cane or walker more than 5-10 minutes.  Alleviating factors: Pain decreases with sitting, lying down, heat and analgesics.   Associated symptoms:   Patient reports pain, numbness and weakness in the lower extremities, worse on the right side.  Patient denies any new bladder or bowel problems. Patient has a history of chronic bladder incontinence.   Patient reports difficulties with her balance but denies recent falls.     Review of previous therapies and additional medical records:  Jewels Venegas has already failed the following measures, including:   Conservative measures: oral analgesics, topical analgesics, physical therapy, ice and heat   Interventional measures: Patient underwent three Racz procedures with , her last procedure was in April. She reports no long-lasting relief from those procedures.  Surgical measures: Patient underwent L2-L4 laminectomies and medial factectomies with  on 06/24/2015. Patient experienced 100% pain relief from her surgery for over 1 year.   Jewels Venegas underwent follow-up neurosurgical consultation with Dr. Reid on 09/26/2017, and was found not to be a surgical candidate.  Patient underwent psychological evaluation with Dr. Coral melendez and has been found to be an appropriate candidate for a spinal cord stimulation  Jewels Venegas presents with significant comorbidities including insomnia, not engaged in treatment, hypertension, osteoporosis,  engaged in treatment.  In terms of current analgesics, Jewels Alexisg takes: Robaxin, tramadol   I have reviewed Bishop Report #[id number] consistent to medication reconciliation.    Global Pain Scale  07-31-18  08-23-18 10-25-18  12-13-18            Pain  14  6  4  6           Feelings  3  0  0  1           Clinical outcomes  11  1  10  3           Activities  13  1  1  212           GPS Total:  41  8  5                Diagnostic Studies:    EMG/NCV of both lower extremities 10/04/2017: Sensorimotor neuropathy, axonal, moderate. 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 There is a minimal dextroscoliotic curvature of the lumbar spine apex L4 with slight rotatory component at the L4-L5 juncture. Multilevel osteophyte formation with bridging anteriorly of the L2-L3, L3-L4, L4-L5 and L5-S1 levels. Facets are well aligned with facet hypertrophy noted in the lower lumbar segments. Preservation of vertebral body heights. Intervertebral disc space heights are decreased at essentially all levels but most significant at the L2-L3, L3-L4, L4-L5 and L5-S1 levels where there is disc desiccation and associated degenerative change of the surrounding endplates. Axial datasets with level by level evaluation as follows:  L1-L2: Severely limited evaluation due to poor thecal sac opacification. No gross thecal sac contour irregularity.  L2-L3: Disc osteophyte complex with associated circumferential disc bulge with concomitant facet hypertrophy producing moderate to severe thecal sac effacement with moderate bilateral nerve root sleeve effacement.  L3-L4: Disc osteophyte complex with associated disc bulge with left lateralizing component involving the left subarticular recess and left neural foramina producing moderate thecal sac effacement with moderate to severe left nerve root sleeve effacement.  L4-L5: Disc osteophyte complex with  disc bulge containing far right lateral and right paracentral components producing mild to moderate thecal sac effacement with mild left and total right nerve root sleeve effacement.  L5-S1: Disc osteophyte complex with circumferential disc bulge component producing mild thecal sac effacement with moderate right nerve root sleeve effacement.  IR MYELOGRAM LUMBAR SPINE-, XR SPINE LUMBAR FLEX AND EXT: There are postoperative changes consistent with a laminectomy at the L2-L5 levels. Moderate diffuse arthropathy of the lumbar spine with disc space narrowing, endplate disease, and osteophyte formation. There is a grade 1 retrolisthesis of L2 on L3 which measures approximate 10 mm in the flexed, and extended positions. There was good filling of the thecal sac. No intrathecal mass was identified. The nerve root sleeves appear bilaterally symmetric. There is mild anterior impression on thecal sac at the L2-L3, L3-L4, and L4-L5 levels. Please correlate with CT imaging.      MRI of the lumbar spine July 31, 2017 revealed retrolisthesis of L2 on L3. Grade 1 anterior spondylolisthesis of L5 on S1. Vertebral body height is preserved. There is multilevel disc desiccation with disc space narrowing. There is normal signal intensity in the cervical cord.  The spinal cord ends at the level of L1-L2.  Disc levels:  L1-L2: Mild annular disc bulge with no central or significant neuroforaminal stenosis  L2-L3: Broad-based disc osteophyte complex.  No central canal stenosis. Moderate bilateral neuroforaminal stenosis.  L3-L4: Right paracentral protrusion.  Disc material a sense along the anterior aspect of the central canal and along the posterior aspect of the inferior endplate of L3.  This is superimposed on this osteophyte complex.  There is no central stenosis.  There is severe bilateral neuroforaminal stenosis.  L4-L5: Broad-based disc osteophyte complex.  There is no central canal stenosis.  Bilateral neuroforaminal  "stenosis.  L5-S1: Disc osteophyte complex.  There is no central canal stenosis. Moderate to severe right and mild left neuroforaminal stenosis.    The following portions of the patient's history were reviewed and updated as appropriate: problem list, past medical history, past surgery history, social history, family history, medications, and allergies    Review of Systems   All other systems reviewed and are negative.    /72   Pulse 78   Temp 97.2 °F (36.2 °C) (Temporal)   Resp 18   Ht 168.9 cm (66.5\")   Wt 88.5 kg (195 lb)   SpO2 98%   BMI 31.00 kg/m²       Physical Exam   Neurologic Exam  Constitutional: Patient is oriented to person, place, and time. Patient appears well-developed and well-nourished.   HEENT: Head: Normocephalic and atraumatic. Eyes: Conjunctivae and lids are normal. Pupils: Equal, round, reactive to light.   Neck: Trachea normal. Neck supple. No JVD present.   Pulmonary Respiratory effort: No increased work of breathing or signs of respiratory distress. Auscultation of lungs: Clear to auscultation.   Cardiovascular Auscultation of heart: Normal rate and rhythm, normal S1 and S2, no murmurs.   Peripheral vascular exam: Femoral: right 2+, left 2+. Posterior tibialis: right 2+ and left 2+. Dorsalis pedis: right 2+ and left 2+. No edema.   Abdomen: The abdomen was soft and nontender. Bowel sounds were normal.   Musculoskeletal   Gait and station: Gait evaluation demonstrated shuffling   Lumbar spine: The range of motion of the lumbar spine is limited secondary to pain. Extension of the lumbar spine increased and reproduced pain. Lumbar facet joint loading maneuvers are positive.  Sean and Gaenslen's tests are negative   Piriformis maneuvers are negative   Palpation of the bilateral ischial tuberosities, unrevealing   Palpation of the bilateral greater trochanter, unrevealing   Examination of the Iliotibial band: unrevealing   Hip joints: The range of motion of the hip joints is full " and without pain   Neurological: Patient is alert and oriented to person, place, and time. Speech: speech is normal. Cortical function: Normal mental status.   Cranial nerves: Cranial nerves 2-12 intact.   Reflex Scores: Right patellar: 1+ Left patellar: 1+ Right achilles: 1+ Left achilles: 1+. Motor strength: 5/5. Negative long tract signs. Straight leg raising test is negative. Femoral stretch sign is negative.   Sensation: No sensory loss. Sensory exam: intact to light touch, intact pain and temperature sensation, intact vibration sensation and normal proprioception.   Coordination: Normal finger to nose and heel to shin. Normal balance and negative. Romberg's sign negative.   Skin and subcutaneous tissue: Skin is warm and intact. No rash noted. No cyanosis.   Psychiatric: Judgment and insight: Normal. Orientation to person, place and time: Normal. Recent and remote memory: Intact. Mood and affect: Normal.     PROCEDURE: Analysis of the spinal cord stimulator device with complex spinal cord stimulator reprogramming   Patient used the [employer] Jude spinal cord stimulator device for 1104 hours since last reprogramming, and 1680 lifetime hours (average 24 hours per day). Analysis of impedence reveals normal impedence for all contacts. The spinal cord stimulator device was reprogrammed under my direct supervision and two programs were created by adjusting electrode polarities, amplitude, pulse width,  pulse rate, BurstDR, micro-dosing, in the following fashion;    Program TWO (Best Program):    Electrode polarities: 3-, 7-, 10+, 11-   Amplitude: 0.35-1 mA     Pulse width: 1000 mcs  Rate: 40 Hz  IntraBurst rate: 500 Hz  Micro-dosin-90    Patient experienced significant pain relief with coverage with pleasant paresthesia in all areas of chronic pain.  Time spent reprogramming: 15 minutes  A copy of the telemetry report will be scanned in the patient's chart.    ASSESSMENT:   1. Post laminectomy syndrome    2. Lumbar  stenosis with neurogenic claudication    3. Polyneuropathy    4. Other osteoarthritis of spine, lumbar region    5. Benign paroxysmal positional vertigo, unspecified laterality    6. Osteoporosis, unspecified osteoporosis type, unspecified pathological fracture presence    7. Insomnia due to medical condition    8. At high risk for falls    9. Physical deconditioning    10. Encounter for fitting and adjustment of neuropacemaker of spinal cord        PLAN: Patient's chronic pain condition has been significantly improved since implantation of her SCS device. I had a lengthy conversation with Ms.Jewels Venegas regarding her chronic pain condition and potential therapeutic options including risks, benefits, alternative therapies, to name a few. Patient is very satisfied with the outcome of her spinal cord stimulator device. Therefore, I have proposed the following plan:  1. Follow up in twelve weeks for possible SCS reprogramming.   2. Pharmacological measures: Reviewed. Discussed.   A. Patient takes Robaxin, tramadol.   B. Continue Rheumate one tablet daily  C. Continue pyridoxine 25 mg one tablet by mouth three times daily  3.  Long-term rehabilitation efforts:  A. The patient does not have a history of falls. I did complete a risk assessment for falls. Fall precautions: Patient has been instructed regarding universal fall precautions, such as;   · Using gait aids a  cane or a rolling walker at the appropriate height at all times for ambulation   · Removing all area rugs and coffee tables to create a safe environment at home  · Ensure clean, dry floors  · Wearing supportive footwear and properly fitting clothing  · Ensure bed/chair is appropriate height and patient's feet can touch the floor  · Using a shower transfer bench  · Using walk-in shower and having shower safety bars installed  · Ensure proper lighting, minimize glare  · Have nightlights operational and in use  · Participation in an exercise program for gait  training, balance training and strength  · Avoid carrying laundry up and down steps  · Ensure proper compliance and organization of medications to avoid errors   · Avoid use of over the counter sedatives and alcohol consumption  · Ensure easy access to call bell, glasses, TV control, telephone  · Ensure glasses/hearing aids are in use or close by (on top of night table)  B.  Continue a comprehensive physical therapy program for water therapy, therapeutic exercise, core strengthening, and vestibular rehabilitation   C.  Start an exercise program such as water therapy  D.  Contrast therapy: Apply ice-packs for 15-20 minutes, followed by heating pads for 15-20 minutes to affected area   4.  The patient has been instructed to contact my office with any questions or difficulties. The patient understands the plan and agrees to proceed accordingly.    Patient Care Team:  Renée Multani MD as PCP - General  Renée Multani MD as PCP - Family Medicine  Apro, Juju Pryor PA-C as Physician Assistant (Physician Assistant)  Doron Gale MD as Consulting Physician (Pain Medicine)  Domenico Reid MD as Consulting Physician (Neurosurgery)     No orders of the defined types were placed in this encounter.        Future Appointments   Date Time Provider Department Center   12/13/2018  1:00 PM Doron Gale MD [facility] None         Doron Gale MD    EMR Dragon/Transcription disclaimer:  Much of this encounter note is an electronic transcription of spoken language to printed text. Electronic transcription of spoken language may permit erroneous, or at times, nonsensical words or phrases to be inadvertently transcribed. Although I have reviewed the note for such errors, some may still exist.

## 2018-12-13 ENCOUNTER — OFFICE VISIT (OUTPATIENT)
Dept: PAIN MEDICINE | Facility: CLINIC | Age: 78
End: 2018-12-13

## 2018-12-13 VITALS
DIASTOLIC BLOOD PRESSURE: 72 MMHG | BODY MASS INDEX: 30.61 KG/M2 | HEIGHT: 67 IN | WEIGHT: 195 LBS | OXYGEN SATURATION: 98 % | RESPIRATION RATE: 18 BRPM | TEMPERATURE: 97.2 F | HEART RATE: 78 BPM | SYSTOLIC BLOOD PRESSURE: 122 MMHG

## 2018-12-13 DIAGNOSIS — Z46.2 ENCOUNTER FOR FITTING AND ADJUSTMENT OF NEUROPACEMAKER OF SPINAL CORD: ICD-10-CM

## 2018-12-13 DIAGNOSIS — M81.0 OSTEOPOROSIS, UNSPECIFIED OSTEOPOROSIS TYPE, UNSPECIFIED PATHOLOGICAL FRACTURE PRESENCE: ICD-10-CM

## 2018-12-13 DIAGNOSIS — M96.1 POST LAMINECTOMY SYNDROME: ICD-10-CM

## 2018-12-13 DIAGNOSIS — M48.062 LUMBAR STENOSIS WITH NEUROGENIC CLAUDICATION: ICD-10-CM

## 2018-12-13 DIAGNOSIS — G47.01 INSOMNIA DUE TO MEDICAL CONDITION: ICD-10-CM

## 2018-12-13 DIAGNOSIS — M47.896 OTHER OSTEOARTHRITIS OF SPINE, LUMBAR REGION: ICD-10-CM

## 2018-12-13 DIAGNOSIS — R53.81 PHYSICAL DECONDITIONING: ICD-10-CM

## 2018-12-13 DIAGNOSIS — H81.10 BENIGN PAROXYSMAL POSITIONAL VERTIGO, UNSPECIFIED LATERALITY: ICD-10-CM

## 2018-12-13 DIAGNOSIS — G62.9 POLYNEUROPATHY: ICD-10-CM

## 2018-12-13 DIAGNOSIS — Z91.81 AT HIGH RISK FOR FALLS: ICD-10-CM

## 2018-12-13 PROCEDURE — 95972 ALYS CPLX SP/PN NPGT W/PRGRM: CPT | Performed by: ANESTHESIOLOGY

## 2018-12-13 PROCEDURE — 99213 OFFICE O/P EST LOW 20 MIN: CPT | Performed by: ANESTHESIOLOGY

## 2019-03-08 NOTE — PROGRESS NOTES
"Chief Complaint: \"I am doing well with the stimulator device. I need more coverage in my knees.\"      Brief History: Mrs. Jewels Venegas is a 78 y.o. female, who underwent implantation of a spinal cord stimulator device with Dr. Vince Olivas on 10/01/2018 with Saint Jude[employer] Andr[employer] Penta paddle lead (Full Body MRI compatible)  with the top electrodes projecting at the level of the superior endplate of the T7 vertebral level. IPG: [employer] Andr[employer] Proclaim 7 IPG Non-Rechargeable (Full Body MRI compatible).  The device was implanted primarily for treatment of lower back and lower extremity pain. Patient returns to the clinic for evaluation of and possible spinal cord stimulator reprogramming. Patient was last seen on 12/13/2018 for SCS reprogramming. Patient is here with a chief complaint of bilateral knee joint pain.  Ms. Jewels Venegas reports 80% relief along with remarkable functional improvement with the use of her stimulator device. Patient reports significant relief of her previously severe neurogenic claudication. Her urinary urgency has decreased remarkably from using 18 Depends a day to using less than 3 a day.  Pain level is rated as 0/10 (sitting) 2/10 (walking) 6/10 (with activity) with the stimulator \"turned on.”  Patient level ranges from 0/10 to 6/10 with the use of the spinal cord stimulator device.    Patient denies pain, numbness or weakness in the lower extremities.  Patient denies any new bladder or bowel problems.     Pain History:  Pain history: Patient reports a 6-year history of pain, which began without incident. Patient underwent multilevel laminectomies L2-L4 in 2015 with Dr. Reid for neurogenic claudication and did exceedingly well with resolution of her symptoms until about 2016. Pain has progressed in intensity over the past 1 year.   Pain description: constant lower back pain with intermittent exacerbation, described as crushing, sharp, pressure, and stabbing sensation.   Radiation of pain: The " lower back pain radiates into the lateral and anterior aspect of the thighs, the lateral aspect of the shins, and into the dorsum of her feet  Pain intensity today: 3/10 (sitting) standing or walking (6/10)  Average pain intensity last week: 4/10  Pain intensity ranges from: 1/10 to 9/10  Aggravating factors: Pain increases with standing longer than 1 minutes and ambulating with her cane or walker more than 5-10 minutes.  Alleviating factors: Pain decreases with sitting, lying down, heat and analgesics.   Associated symptoms:   Patient reports pain, numbness and weakness in the lower extremities, worse on the right side.  Patient denies any new bladder or bowel problems. Patient has a history of chronic bladder incontinence.   Patient reports difficulties with her balance but denies recent falls.     Review of previous therapies and additional medical records:  Jewels Venegas has already failed the following measures, including:   Conservative measures: oral analgesics, topical analgesics, physical therapy, ice and heat   Interventional measures: Patient underwent three Racz procedures with , her last procedure was in April. She reports no long-lasting relief from those procedures.  Surgical measures: Patient underwent L2-L4 laminectomies and medial factectomies with  on 06/24/2015. Patient experienced 100% pain relief from her surgery for over 1 year.   Jewels Venegas underwent follow-up neurosurgical consultation with Dr. Reid on 09/26/2017, and was found not to be a surgical candidate.  Patient underwent psychological evaluation with Dr. Coral melendez and has been found to be an appropriate candidate for a spinal cord stimulation  Jewels Venegas presents with significant comorbidities including insomnia, not engaged in treatment, hypertension, osteoporosis, engaged in treatment.  In terms of current analgesics, Jewels Venegas takes: Robaxin, tramadol   I have reviewed [facility] Report #[id number]  consistent to medication reconciliation.    Global Pain Scale  07-31  2018  08-23  2018 10-25  2018  12-13  2018  03-12  2019           Pain  14  6  4  6  6           Feelings  3  0  0  1  0           Clinical outcomes  11  1  10  3  1           Activities  13  1  1  2  2           GPS Total:  41  8  15  12  9             Diagnostic Studies:    EMG/NCV of both lower extremities 10/04/2017: Sensorimotor neuropathy, axonal, moderate. 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 There is a minimal dextroscoliotic curvature of the lumbar spine apex L4 with slight rotatory component at the L4-L5 juncture. Multilevel osteophyte formation with bridging anteriorly of the L2-L3, L3-L4, L4-L5 and L5-S1 levels. Facets are well aligned with facet hypertrophy noted in the lower lumbar segments. Preservation of vertebral body heights. Intervertebral disc space heights are decreased at essentially all levels but most significant at the L2-L3, L3-L4, L4-L5 and L5-S1 levels where there is disc desiccation and associated degenerative change of the surrounding endplates. Axial datasets with level by level evaluation as follows:  L1-L2: Severely limited evaluation due to poor thecal sac opacification. No gross thecal sac contour irregularity.  L2-L3: Disc osteophyte complex with associated circumferential disc bulge with concomitant facet hypertrophy producing moderate to severe thecal sac effacement with moderate bilateral nerve root sleeve effacement.  L3-L4: Disc osteophyte complex with associated disc bulge with left lateralizing component involving the left subarticular recess and left neural foramina producing moderate thecal sac effacement with moderate to severe left nerve root sleeve effacement.  L4-L5: Disc osteophyte complex with disc bulge containing far right lateral and right paracentral components producing mild to  moderate thecal sac effacement with mild left and total right nerve root sleeve effacement.  L5-S1: Disc osteophyte complex with circumferential disc bulge component producing mild thecal sac effacement with moderate right nerve root sleeve effacement.  IR MYELOGRAM LUMBAR SPINE-, XR SPINE LUMBAR FLEX AND EXT: There are postoperative changes consistent with a laminectomy at the L2-L5 levels. Moderate diffuse arthropathy of the lumbar spine with disc space narrowing, endplate disease, and osteophyte formation. There is a grade 1 retrolisthesis of L2 on L3 which measures approximate 10 mm in the flexed, and extended positions. There was good filling of the thecal sac. No intrathecal mass was identified. The nerve root sleeves appear bilaterally symmetric. There is mild anterior impression on thecal sac at the L2-L3, L3-L4, and L4-L5 levels. Please correlate with CT imaging.      MRI of the lumbar spine July 31, 2017 revealed retrolisthesis of L2 on L3. Grade 1 anterior spondylolisthesis of L5 on S1. Vertebral body height is preserved. There is multilevel disc desiccation with disc space narrowing. There is normal signal intensity in the cervical cord.  The spinal cord ends at the level of L1-L2.  Disc levels:  L1-L2: Mild annular disc bulge with no central or significant neuroforaminal stenosis  L2-L3: Broad-based disc osteophyte complex.  No central canal stenosis. Moderate bilateral neuroforaminal stenosis.  L3-L4: Right paracentral protrusion.  Disc material a sense along the anterior aspect of the central canal and along the posterior aspect of the inferior endplate of L3.  This is superimposed on this osteophyte complex.  There is no central stenosis. There is severe bilateral neuroforaminal stenosis.  L4-L5: Broad-based disc osteophyte complex.  There is no central canal stenosis.  Bilateral neuroforaminal stenosis.  L5-S1: Disc osteophyte complex.  There is no central canal stenosis. Moderate to severe right  and mild left neuroforaminal stenosis.    The following portions of the patient's history were reviewed and updated as appropriate: problem list, past medical history, past surgery history, social history, family history, medications, and allergies    Review of Systems   Musculoskeletal: Positive for joint swelling.   All other systems reviewed and are negative.    /69 (BP Location: Right arm, Patient Position: Sitting, Cuff Size: Adult)   Pulse 67   Resp 18   Wt 89.8 kg (198 lb)   SpO2 98%   BMI 31.48 kg/m²       Physical Exam   Neurologic Exam  Constitutional: Patient is oriented to person, place, and time. Patient appears well-developed and well-nourished.   HEENT: Head: Normocephalic and atraumatic. Eyes: Conjunctivae and lids are normal. Pupils: Equal, round, reactive to light.   Neck: Trachea normal. Neck supple. No JVD present.   Pulmonary Respiratory effort: No increased work of breathing or signs of respiratory distress. Auscultation of lungs: Clear to auscultation.   Cardiovascular Auscultation of heart: Normal rate and rhythm, normal S1 and S2, no murmurs.   Peripheral vascular exam: Femoral: right 2+, left 2+. Posterior tibialis: right 2+ and left 2+. Dorsalis pedis: right 2+ and left 2+. No edema.   Abdomen: The abdomen was soft and nontender. Bowel sounds were normal.   Musculoskeletal   Gait and station: Gait evaluation demonstrated shuffling   Lumbar spine: The range of motion of the lumbar spine is limited secondary to pain. Extension of the lumbar spine increased and reproduced pain. Lumbar facet joint loading maneuvers are positive.  Sean and Gaenslen's tests are negative   Piriformis maneuvers are negative   Palpation of the bilateral ischial tuberosities, unrevealing   Palpation of the bilateral greater trochanter, unrevealing   Examination of the Iliotibial band: unrevealing   Hip joints: The range of motion of the hip joints is full and without pain   Right knee: ROM: -5 to 100  degrees. No ACL, PCL, LCL or MCL laxity. No tenderness found. No MCL and no LCL tenderness noted. Crepitus.  Left knee: ROM; -5 to 100 degrees. No ACL, PCL, LCL or MCL laxity. No tenderness found. No MCL and no LCL tenderness noted. Crepitus.  Neurological: Patient is alert and oriented to person, place, and time. Speech: speech is normal. Cortical function: Normal mental status.   Cranial nerves: Cranial nerves 2-12 intact.   Reflex Scores: Right patellar: 1+ Left patellar: 1+ Right achilles: 1+ Left achilles: 1+. Motor strength: 5/5. Negative long tract signs. Straight leg raising test is negative. Femoral stretch sign is negative.   Sensation: No sensory loss. Sensory exam: intact to light touch, intact pain and temperature sensation, intact vibration sensation and normal proprioception.   Coordination: Normal finger to nose and heel to shin. Normal balance and negative. Romberg's sign negative.   Skin and subcutaneous tissue: Skin is warm and intact. No rash noted. No cyanosis.   Psychiatric: Judgment and insight: Normal. Orientation to person, place and time: Normal. Recent and remote memory: Intact. Mood and affect: Normal.     PROCEDURE: Analysis of the spinal cord stimulator device with complex spinal cord stimulator reprogramming   Patient used the Saint Jude spinal cord stimulator device for 2149 hours since last reprogramming, and 3829 lifetime hours (average 24 hours per day). Analysis of impedence reveals normal impedence for all contacts. The spinal cord stimulator device was reprogrammed under my direct supervision and two programs were created by adjusting electrode polarities, amplitude, pulse width,  pulse rate, BurstDR, micro-dosing, in the following fashion;    Program ONE (Best Program):    Electrode polarities: 7-, 8+, 9+   Amplitude: 0.4-1 mA     Pulse width: 1000 mcs  Rate: 40 Hz  IntraBurst rate: 500 Hz  Micro-dosin-90    Patient experienced significant pain relief with coverage with  pleasant paresthesia in all areas of chronic pain.  Time spent reprogramming: 10 minutes  A copy of the telemetry report will be scanned in the patient's chart.    ASSESSMENT:   1. Primary osteoarthritis of both knees    2. Post laminectomy syndrome    3. Lumbar stenosis with neurogenic claudication    4. Polyneuropathy    5. Osteoporosis, unspecified osteoporosis type, unspecified pathological fracture presence    6. Insomnia due to medical condition    7. Benign paroxysmal positional vertigo, unspecified laterality    8. At high risk for falls    9. Physical deconditioning    10. Encounter for fitting and adjustment of neuropacemaker of spinal cord        PLAN: Patient's chronic pain condition has been significantly improved since implantation of her SCS device. I had a lengthy conversation with Ms.Jewels Venegas regarding her chronic pain condition and potential therapeutic options including risks, benefits, alternative therapies, to name a few. Patient is very satisfied with the outcome of her spinal cord stimulator device. She has been dealing with increasing knee pain. She has a known history of bilateral knee OA. Therefore, I have proposed the following plan:  1. Diagnostics: Bilateral knee X-rays, full views  2. Interventional pain management measures: Patient will be scheduled for bilateral knee joint injection with Synvisc One. If patient continues to struggle with pain, then, we may consider steroid injections versus diagnostic bilateral superior medial genicular nerve block, bilateral superior lateral genicular nerve block and bilateral inferior medial genicular nerve block. If patient experiences more than 50% pain relief along with significant improvement in the range of motion of the knee joint, then, patient will be scheduled for bipolar radiofrequency ablation of the bilateral superomedial, superolateral and inferomedial genicular nerves  3. Pharmacological measures: Reviewed. Discussed.   A. Patient  takes Robaxin, tramadol. We will refill Robaxin today  B. Continue Rheumate one tablet daily  C. Continue pyridoxine 25 mg one tablet by mouth three times daily  4.  Long-term rehabilitation efforts:  A. The patient does not have a history of falls. I did complete a risk assessment for falls. Fall precautions: Patient has been instructed regarding universal fall precautions, such as;   · Using gait aids a  cane or a rolling walker at the appropriate height at all times for ambulation   · Removing all area rugs and coffee tables to create a safe environment at home  · Ensure clean, dry floors  · Wearing supportive footwear and properly fitting clothing  · Ensure bed/chair is appropriate height and patient's feet can touch the floor  · Using a shower transfer bench  · Using walk-in shower and having shower safety bars installed  · Ensure proper lighting, minimize glare  · Have nightlights operational and in use  · Participation in an exercise program for gait training, balance training and strength  · Avoid carrying laundry up and down steps  · Ensure proper compliance and organization of medications to avoid errors   · Avoid use of over the counter sedatives and alcohol consumption  · Ensure easy access to call bell, glasses, TV control, telephone  · Ensure glasses/hearing aids are in use or close by (on top of night table)  B.  Patient will resume a comprehensive physical therapy program for water therapy, therapeutic exercise, core strengthening, and vestibular rehabilitation once her knee pain is under control  C.  Start an exercise program such as water therapy  D.  Contrast therapy: Apply ice-packs for 15-20 minutes, followed by heating pads for 15-20 minutes to affected area   5.  The patient has been instructed to contact my office with any questions or difficulties. The patient understands the plan and agrees to proceed accordingly.    Patient Care Team:  Renée Multani MD as PCP - General  Renée Multani,  MD as PCP - Family Medicine  Apro, Juju Pryor PA-C as Physician Assistant (Physician Assistant)  Doron Gale MD as Consulting Physician (Pain Medicine)  Domenico Reid MD as Consulting Physician (Neurosurgery)     No orders of the defined types were placed in this encounter.        Future Appointments   Date Time Provider Department Center   3/12/2019  1:00 PM Doron Gale MD [facility] None         Doron Gale MD    EMR Dragon/Transcription disclaimer:  Much of this encounter note is an electronic transcription of spoken language to printed text. Electronic transcription of spoken language may permit erroneous, or at times, nonsensical words or phrases to be inadvertently transcribed. Although I have reviewed the note for such errors, some may still exist.

## 2019-03-12 ENCOUNTER — OFFICE VISIT (OUTPATIENT)
Dept: PAIN MEDICINE | Facility: CLINIC | Age: 79
End: 2019-03-12

## 2019-03-12 ENCOUNTER — HOSPITAL ENCOUNTER (OUTPATIENT)
Dept: GENERAL RADIOLOGY | Facility: HOSPITAL | Age: 79
Discharge: HOME | End: 2019-03-12
Admitting: ANESTHESIOLOGY

## 2019-03-12 VITALS
HEART RATE: 67 BPM | BODY MASS INDEX: 31.48 KG/M2 | DIASTOLIC BLOOD PRESSURE: 69 MMHG | SYSTOLIC BLOOD PRESSURE: 128 MMHG | WEIGHT: 198 LBS | RESPIRATION RATE: 18 BRPM | OXYGEN SATURATION: 98 %

## 2019-03-12 DIAGNOSIS — M17.0 PRIMARY OSTEOARTHRITIS OF BOTH KNEES: Primary | ICD-10-CM

## 2019-03-12 DIAGNOSIS — Z91.81 AT HIGH RISK FOR FALLS: ICD-10-CM

## 2019-03-12 DIAGNOSIS — Z46.2 ENCOUNTER FOR FITTING AND ADJUSTMENT OF NEUROPACEMAKER OF SPINAL CORD: ICD-10-CM

## 2019-03-12 DIAGNOSIS — G47.01 INSOMNIA DUE TO MEDICAL CONDITION: ICD-10-CM

## 2019-03-12 DIAGNOSIS — G62.9 POLYNEUROPATHY: ICD-10-CM

## 2019-03-12 DIAGNOSIS — M96.1 POST LAMINECTOMY SYNDROME: ICD-10-CM

## 2019-03-12 DIAGNOSIS — H81.10 BENIGN PAROXYSMAL POSITIONAL VERTIGO, UNSPECIFIED LATERALITY: ICD-10-CM

## 2019-03-12 DIAGNOSIS — R53.81 PHYSICAL DECONDITIONING: ICD-10-CM

## 2019-03-12 DIAGNOSIS — M81.0 OSTEOPOROSIS, UNSPECIFIED OSTEOPOROSIS TYPE, UNSPECIFIED PATHOLOGICAL FRACTURE PRESENCE: ICD-10-CM

## 2019-03-12 DIAGNOSIS — M17.0 PRIMARY OSTEOARTHRITIS OF BOTH KNEES: ICD-10-CM

## 2019-03-12 DIAGNOSIS — M48.062 LUMBAR STENOSIS WITH NEUROGENIC CLAUDICATION: ICD-10-CM

## 2019-03-12 PROCEDURE — 95972 ALYS CPLX SP/PN NPGT W/PRGRM: CPT | Performed by: ANESTHESIOLOGY

## 2019-03-12 PROCEDURE — 99214 OFFICE O/P EST MOD 30 MIN: CPT | Performed by: ANESTHESIOLOGY

## 2019-03-12 PROCEDURE — 73560 X-RAY EXAM OF KNEE 1 OR 2: CPT

## 2019-03-12 PROCEDURE — 73560 X-RAY EXAM OF KNEE 1 OR 2: CPT | Performed by: RADIOLOGY

## 2019-03-12 RX ORDER — METHOCARBAMOL 750 MG/1
750 TABLET, FILM COATED ORAL 2 TIMES DAILY
Qty: 60 TABLET | Refills: 5 | Status: SHIPPED | OUTPATIENT
Start: 2019-03-12

## 2019-04-03 ENCOUNTER — OUTSIDE FACILITY SERVICE (OUTPATIENT)
Dept: PAIN MEDICINE | Facility: CLINIC | Age: 79
End: 2019-04-03

## 2019-04-03 PROCEDURE — 20610 DRAIN/INJ JOINT/BURSA W/O US: CPT | Performed by: ANESTHESIOLOGY

## 2019-04-03 PROCEDURE — 77002 NEEDLE LOCALIZATION BY XRAY: CPT | Performed by: ANESTHESIOLOGY

## 2019-04-03 PROCEDURE — 99152 MOD SED SAME PHYS/QHP 5/>YRS: CPT | Performed by: ANESTHESIOLOGY

## 2019-04-04 ENCOUNTER — TELEPHONE (OUTPATIENT)
Dept: PAIN MEDICINE | Facility: CLINIC | Age: 79
End: 2019-04-04

## 2019-04-04 NOTE — TELEPHONE ENCOUNTER
Patient had bilateral knee joint injection with synvisc one. She reports that she is doing well. Her pain has gone from 9/10 to 3/10-5/10. She has been using ice

## 2019-06-10 ENCOUNTER — HOSPITAL ENCOUNTER (OUTPATIENT)
Dept: MAMMOGRAPHY | Facility: HOSPITAL | Age: 79
Discharge: HOME | End: 2019-06-10
Admitting: INTERNAL MEDICINE

## 2019-06-10 DIAGNOSIS — Z12.39 SCREENING BREAST EXAMINATION: ICD-10-CM

## 2019-06-10 PROCEDURE — 77067 SCR MAMMO BI INCL CAD: CPT

## 2019-06-10 PROCEDURE — 77063 BREAST TOMOSYNTHESIS BI: CPT

## 2019-06-10 PROCEDURE — 77063 BREAST TOMOSYNTHESIS BI: CPT | Performed by: RADIOLOGY

## 2019-06-10 PROCEDURE — 77067 SCR MAMMO BI INCL CAD: CPT | Performed by: RADIOLOGY

## 2019-06-12 NOTE — PROGRESS NOTES
"Chief Complaint: \"Lower back and knee pain.\"    Brief History: Mrs. Jewels Venegas is a 78 y.o. female, who underwent implantation of a [employer] spinal cord stimulator device (Full Body MRI compatible) with Dr. Vince Olivas on 10/01/2018.  The device was implanted primarily for treatment of lower back and lower extremity pain.  Patient returns to the clinic for post-procedure follow-up and possible spinal cord stimulator reprogramming.  Patient was last seen for SCS reprogramming on 03/12/2019.  She was then seen on 04/03/2019 for bilateral knee joint injections with Synvisc One and experienced almost complete relief that lasted for 2 weeks.  The pain then progressively recurred.  Pain level is rated as 1/10 with the stimulator \"turned on.”  Patient level ranges from 0/10 to 8/10 with the use of the spinal cord stimulator device.    Patient denies pain, numbness, or weakness in the lower extremities.  Patient denies any new bladder or bowel problems.     Pain History:  Pain history: Patient reports a 6-year history of pain, which began without incident. Patient underwent multilevel laminectomies L2-L4 in 2015 with Dr. Reid for neurogenic claudication and did exceedingly well with resolution of her symptoms until about 2016. Pain has progressed in intensity over the past 1 year.   Pain description: constant lower back pain with intermittent exacerbation, described as crushing, sharp, pressure, and stabbing sensation.   Radiation of pain: The lower back pain radiates into the lateral and anterior aspect of the thighs, the lateral aspect of the shins, and into the dorsum of her feet  Pain intensity today: 3/10 (sitting) standing or walking (6/10)  Average pain intensity last week: 4/10  Pain intensity ranges from: 1/10 to 9/10  Aggravating factors: Pain increases with standing longer than 1 minutes and ambulating with her cane or walker more than 5-10 minutes.  Alleviating factors: Pain decreases with sitting, lying " down, heat and analgesics.   Associated symptoms:   Patient reports pain, numbness and weakness in the lower extremities, worse on the right side.  Patient denies any new bladder or bowel problems. Patient has a history of chronic bladder incontinence.   Patient reports difficulties with her balance but denies recent falls.     Review of previous therapies and additional medical records:  Jewels Venegas has already failed the following measures, including:   Conservative measures: oral analgesics, topical analgesics, physical therapy, ice and heat   Interventional measures: As referenced above.  Three Racz procedures with Dr. Garcia; last procedure was in April.   Surgical measures: L2-L4 laminectomies and medial factectomies with  on 06/24/2015.   Jewels Venegas underwent follow-up neurosurgical consultation with Dr. Reid on 09/26/2017, and was found not to be a surgical candidate.  Patient underwent psychological evaluation with Dr. Coral Lea and has been found to be an appropriate candidate for a spinal cord stimulation  Jewels Venegas presents with significant comorbidities including insomnia, not engaged in treatment, hypertension, osteoporosis, engaged in treatment.  In terms of current analgesics, Jewels Venegas takes: Robaxin, tramadol   I have reviewed [employer] Report #[id number], consistent to medication reconciliation.    Global Pain Scale  07-31  2018  08-23  2018 10-25  2018  12-13  2018  03-12  2019  06-13  2019         Pain  14  6  4  6  6  6         Feelings  3  0  0  1  0  1         Clinical outcomes  11  1  10  3  1  2         Activities  13  1  1  2  2  2         GPS Total:  41  8  15  12  9  11           Diagnostic Studies:    XR KNEE 1 OR 2 VIEW BILATERAL- 03/13/2019: Advanced osteoarthritis involving both knees.    The following portions of the patient's history were reviewed and updated as appropriate: problem list, past medical history, past surgery history, social history, family history,  "medications, and allergies    Review of Systems   Musculoskeletal: Positive for arthralgias and back pain.   All other systems reviewed and are negative.    /60 (BP Location: Left arm, Patient Position: Sitting)   Pulse 69   Temp 97.9 °F (36.6 °C) (Temporal)   Ht 168.9 cm (66.5\")   Wt 89.8 kg (198 lb)   SpO2 98%   BMI 31.48 kg/m²       Physical Exam   Neurologic Exam  Constitutional: Patient is oriented to person, place, and time.  Appears well-developed and well-nourished.   Head: Normocephalic and atraumatic. Eyes: Conjunctivae and lids are normal. Pupils: Equal, round, and reactive to light.   Neck: Trachea normal. Neck supple. No JVD present.   Pulmonary: No increased work of breathing or signs of respiratory distress.  Clear to auscultation bilaterally.   Cardiovascular: Normal rate and rhythm, normal S1 and S2, no murmurs.   Musculoskeletal   Gait and station: shuffling   Lumbar spine: The range of motion of the lumbar spine is limited secondary to pain. Extension of the lumbar spine increased and reproduced pain. Lumbar facet joint loading maneuvers are positive.  Sean and Gaenslen's tests are negative   Hip joints: The range of motion of the hip joints is full and without pain   Right knee: Decreased ROM. No ACL, PCL, LCL or MCL laxity. Non-tender to palpation. Crepitus.  Left knee: Decreased ROM.  No ACL, PCL, LCL or MCL laxity.  Non-tender to palpation.  Crepitus.  Neurological: Patient is alert and oriented to person, place, and time. Speech: speech is normal. Cortical function: Normal mental status.   Cranial nerves: Cranial nerves 2-12 intact.   Reflex Scores: patellar: 1+ bilaterally.  Achilles: 1+ bilaterally.   Motor strength: 5/5.   Negative long tract signs. Straight leg raising test is negative.   Sensation: No sensory loss. Sensory exam: intact to light touch, intact pain and temperature sensation, intact vibration sensation and normal proprioception.   Coordination: Normal finger " to nose and heel to shin. Normal balance and negative. Romberg's sign negative.   Skin and subcutaneous tissue: Skin is warm and intact. No rash noted. No cyanosis.   Psychiatric: Judgment and insight: Normal. Orientation to person, place and time: Normal. Recent and remote memory: Intact. Mood and affect: Normal.     PROCEDURE: Analysis of the spinal cord stimulator device with complex spinal cord stimulator reprogramming   Patient used the [employer] spinal cord stimulator device for 2118 hours since last reprogramming, and 5947 lifetime hours (average 24 hours per day).  Analysis of impedence reveals normal impedence for all contacts. The spinal cord stimulator device was reprogrammed and two programs were created by adjusting electrode polarities, amplitude, pulse width,  pulse rate, BurstDR, micro-dosing, in the following fashion;    Program TWO (Best Program):    Electrode polarities: 7-, 8+   Amplitude: 0.2-1 mA     Pulse width: 1000 mcs  Rate: 40 Hz  IntraBurst rate: 500 Hz  Micro-dosin-90    Time spent reprogramming: 10 minutes  A copy of the telemetry report will be scanned in the patient's chart.    ASSESSMENT:   1. Primary osteoarthritis of both knees    2. Post laminectomy syndrome    3. Lumbar stenosis with neurogenic claudication    4. Polyneuropathy    5. Osteoporosis, unspecified osteoporosis type, unspecified pathological fracture presence    6. Benign paroxysmal positional vertigo, unspecified laterality        PLAN: I have reviewed the above medical records, and discussed the patient with Dr. Gale.  1. Interventional pain management measures: None scheduled.  Patient requested orthopedic consultation with Dr. Faustino Weinstein for her bilateral knee OA.  2. Follow-up as needed for SCS reprogramming.  3. Pharmacological measures: Reviewed and discussed.   A. Continue Rheumate one tablet daily  B. Continue pyridoxine 25 mg one tablet by mouth three times daily  4.  Long-term rehabilitation  efforts:  A.  Start an exercise program such as water therapy  B.  Contrast therapy: Apply ice-packs for 15-20 minutes, followed by heating pads for 15-20 minutes to affected area   4.  The patient has been instructed to contact my office with any questions or difficulties. The patient understands the plan and agrees to proceed accordingly.    Patient Care Team:  Renée Multani MD as PCP - General  Renée Multani MD as PCP - Family Medicine  Apro, Juju Pryor PA-C as Physician Assistant (Physician Assistant)  Doron Gale MD as Consulting Physician (Pain Medicine)  Domenico Reid MD as Consulting Physician (Neurosurgery)     No orders of the defined types were placed in this encounter.        No future appointments.      [place] PA-C    EMR Dragon/Transcription disclaimer:  Much of this encounter note is an electronic transcription of spoken language to printed text. Electronic transcription of spoken language may permit erroneous, or at times, nonsensical words or phrases to be inadvertently transcribed. Although I have reviewed the note for such errors, some may still exist.

## 2019-06-13 ENCOUNTER — OFFICE VISIT (OUTPATIENT)
Dept: PAIN MEDICINE | Facility: CLINIC | Age: 79
End: 2019-06-13

## 2019-06-13 VITALS
HEIGHT: 67 IN | BODY MASS INDEX: 31.08 KG/M2 | DIASTOLIC BLOOD PRESSURE: 60 MMHG | TEMPERATURE: 97.9 F | HEART RATE: 69 BPM | OXYGEN SATURATION: 98 % | WEIGHT: 198 LBS | SYSTOLIC BLOOD PRESSURE: 122 MMHG

## 2019-06-13 DIAGNOSIS — H81.10 BENIGN PAROXYSMAL POSITIONAL VERTIGO, UNSPECIFIED LATERALITY: ICD-10-CM

## 2019-06-13 DIAGNOSIS — M48.062 LUMBAR STENOSIS WITH NEUROGENIC CLAUDICATION: ICD-10-CM

## 2019-06-13 DIAGNOSIS — M81.0 OSTEOPOROSIS, UNSPECIFIED OSTEOPOROSIS TYPE, UNSPECIFIED PATHOLOGICAL FRACTURE PRESENCE: ICD-10-CM

## 2019-06-13 DIAGNOSIS — M96.1 POST LAMINECTOMY SYNDROME: ICD-10-CM

## 2019-06-13 DIAGNOSIS — M17.0 PRIMARY OSTEOARTHRITIS OF BOTH KNEES: Primary | ICD-10-CM

## 2019-06-13 DIAGNOSIS — G62.9 POLYNEUROPATHY: ICD-10-CM

## 2019-06-13 PROCEDURE — 99213 OFFICE O/P EST LOW 20 MIN: CPT | Performed by: PHYSICIAN ASSISTANT

## 2019-06-13 PROCEDURE — 95972 ALYS CPLX SP/PN NPGT W/PRGRM: CPT | Performed by: PHYSICIAN ASSISTANT

## 2019-07-22 ENCOUNTER — OFFICE VISIT (OUTPATIENT)
Dept: ORTHOPEDIC SURGERY | Facility: CLINIC | Age: 79
End: 2019-07-22

## 2019-07-22 VITALS — HEIGHT: 67 IN | HEART RATE: 76 BPM | OXYGEN SATURATION: 98 % | BODY MASS INDEX: 31.07 KG/M2 | WEIGHT: 197.97 LBS

## 2019-07-22 DIAGNOSIS — M17.11 PRIMARY OSTEOARTHRITIS OF RIGHT KNEE: Primary | ICD-10-CM

## 2019-07-22 DIAGNOSIS — M17.12 PRIMARY OSTEOARTHRITIS OF LEFT KNEE: ICD-10-CM

## 2019-07-22 PROCEDURE — 99203 OFFICE O/P NEW LOW 30 MIN: CPT | Performed by: ORTHOPAEDIC SURGERY

## 2019-07-22 RX ORDER — MELOXICAM 7.5 MG/1
15 TABLET ORAL ONCE
Status: CANCELLED | OUTPATIENT
Start: 2019-07-22 | End: 2019-07-22

## 2019-07-22 RX ORDER — PREGABALIN 150 MG/1
150 CAPSULE ORAL ONCE
Status: CANCELLED | OUTPATIENT
Start: 2019-07-22 | End: 2019-07-22

## 2019-07-22 RX ORDER — ACETAMINOPHEN 325 MG/1
1000 TABLET ORAL ONCE
Status: CANCELLED | OUTPATIENT
Start: 2019-07-22 | End: 2019-07-22

## 2019-07-22 NOTE — PROGRESS NOTES
"    [facility] Note    Subjective     Chief Complaint   Patient presents with   • Right Knee - Pain     Pain present in both knees for many years. Progressively getting worse. Pain when standing. Going from sit to stand is the worse. Often pain at night in both knees. No previous surgeries. No falls or    • Left Knee - Pain        HPI    Jewels Venegas is a 78 y.o. female.  She presents today for evaluation of bilateral knee pain.  Both knees bother her approximately the same, with pain, swelling, popping, grinding, stiffness and giving way.  It is worse with walking, standing and climbing stairs.  She has pain at night, as well as with work and leisure activities.  The pain is 9 out of 10 at its worst, and is worsening over time.  She walks with a walker secondary to her knees and vertigo.  Previous injections, which did provide relief initially, but now are not working.  She would like to consider knee replacement surgery.      Patient Active Problem List   Diagnosis   • Post laminectomy syndrome   • S/P insertion of spinal cord stimulator   • Osteoporosis   • Lumbar stenosis with neurogenic claudication   • Polyneuropathy   • Spondylarthrosis   • Benign paroxysmal positional vertigo   • Insomnia due to medical condition   • At high risk for falls   • Physical deconditioning   • Encounter for fitting and adjustment of neuropacemaker of spinal cord   • Primary osteoarthritis of both knees     Past Medical History:   Diagnosis Date   • Arthritis    • Back problem    • Hypertension    • Osteoporosis    • Thyroid disease    • TIA (transient ischemic attack)     \"Possibly\"   • Vertigo    • Wears dentures    • Wears glasses       Past Surgical History:   Procedure Laterality Date   • APPENDECTOMY  1977   • CARDIAC CATHETERIZATION     • CHOLECYSTECTOMY     • COLONOSCOPY     • LUMBAR LAMINECTOMY  06/2015    Dr. Domenico Reid, [facility] L2-5   • SPINAL CORD STIMULATOR IMPLANT N/A 10/1/2018 "    Procedure: SPINAL CORD STIMULATOR INSERTION PHASE 1 ;  Surgeon: Vince Olivas MD;  Location: [facility];  Service: Neurosurgery      Family History   Problem Relation Age of Onset   • Cancer Father    • Heart disease Father    • Breast cancer Neg Hx      Social History     Socioeconomic History   • Marital status:      Spouse name: Not on file   • Number of children: Not on file   • Years of education: Not on file   • Highest education level: Not on file   Tobacco Use   • Smoking status: Former Smoker     Years: 20.00     Types: Cigarettes     Last attempt to quit: 1980     Years since quittin.5   • Smokeless tobacco: Never Used   • Tobacco comment: Smoking 4 (!!) PPD when patient quit    Substance and Sexual Activity   • Alcohol use: No   • Drug use: No   • Sexual activity: Defer      Current Outpatient Medications on File Prior to Visit   Medication Sig Dispense Refill   • acetaminophen (TYLENOL) 500 MG tablet Take 500 mg by mouth Every 6 (Six) Hours As Needed for Mild Pain .     • amLODIPine (NORVASC) 10 MG tablet Take 10 mg by mouth Daily.     • amLODIPine (NORVASC) 5 MG tablet Take 5 mg by mouth Daily.     • aspirin 81 MG EC tablet Take 81 mg by mouth Daily. Stopped in mid Aug 2018     • atorvastatin (LIPITOR) 20 MG tablet Take 20 mg by mouth 2 (Two) Times a Week. MON AND THURS     • Biotin (BIOTIN 5000) 5 MG capsule Take  by mouth.     • Biotin 07549 MCG tablet dispersible Take 1 tablet by mouth Daily.     • Coenzyme Q10 (CO Q 10) 100 MG capsule Take 1 tablet by mouth Daily.     • Cyanocobalamin (B-12-SL SL) Place 1 tablet under the tongue Daily.     • Dermatological Products, Misc. (EPICERAM) lotion Apply twice daily to back (Patient taking differently: Apply 1 application topically to the appropriate area as directed 2 (Two) Times a Day With Meals. Apply twice daily to back) 225 g 0   • Dietary Management Product (RHEUMATE) capsule Take 1 capsule once daily (Patient taking differently:  Take 1 tablet by mouth Daily. Take 1 capsule once daily) 30 capsule 5   • fluticasone (FLONASE) 50 MCG/ACT nasal spray 2 sprays into each nostril As Needed for Rhinitis.     • HYDROcodone-acetaminophen (NORCO) 7.5-325 MG per tablet Take 1 tablet by mouth 3 (Three) Times a Day As Needed for Moderate Pain  (Pain). 20 tablet 0   • Krill Oil 500 MG capsule Take 1 tablet by mouth Daily.     • levocetirizine (XYZAL) 5 MG tablet Take 5 mg by mouth Every Evening.     • levothyroxine (SYNTHROID, LEVOTHROID) 112 MCG tablet Take 112 mcg by mouth Daily.     • methocarbamol (ROBAXIN) 750 MG tablet Take 1 tablet by mouth 2 (Two) Times a Day. 60 tablet 5   • Multiple Vitamins-Minerals (MULTIVITAMIN WITH MINERALS) tablet tablet Take 1 tablet by mouth Daily.     • Omega-3 1000 MG capsule Take 1 tablet by mouth Daily.     • pyridoxine (VITAMIN B-6) 25 MG tablet Take 1 tablet by mouth 3 (Three) Times a Day. 90 tablet 5   • quinapril-hydrochlorothiazide (ACCURETIC) 20-25 MG per tablet Take 1 tablet by mouth Daily.     • traMADol (ULTRAM) 50 MG tablet Take 1 tablet by mouth Every 6 (Six) Hours As Needed for Moderate Pain . 60 tablet    • vitamin D (ERGOCALCIFEROL) 34759 units capsule capsule Take 50,000 Units by mouth 1 (One) Time Per Week.       No current facility-administered medications on file prior to visit.       Allergies   Allergen Reactions   • Betadine [Povidone Iodine] Rash     Itching, and blisters   • Adhesive Tape Rash   • Codeine Itching   • Influenza Vaccines Other (See Comments) and Myalgia     fever        Review of Systems   Constitutional: Positive for activity change.   HENT: Negative.    Eyes: Negative.    Respiratory: Negative.    Cardiovascular: Negative.    Gastrointestinal: Negative.    Endocrine: Negative.    Genitourinary: Negative.    Musculoskeletal: Positive for arthralgias (bilateral knee) and joint swelling.   Skin: Negative.    Allergic/Immunologic: Negative.    Neurological: Negative.   "  Hematological: Negative.    Psychiatric/Behavioral: Negative.         Objective      Physical Exam  Pulse 76   Ht 168.9 cm (66.5\")   Wt 89.8 kg (197 lb 15.6 oz)   SpO2 98%   Breastfeeding? No   BMI 31.47 kg/m²     Body mass index is 31.47 kg/m².    General:   Mental Status:  Alert   Appearance: Cooperative, in no acute distress   Build and Nutrition: Well-nourished well-developed female   Orientation: Alert and oriented to person, place and time   Posture: Normal   Gait: Limping on both lower extremities, and has a walker    Integument:   Right knee: no skin lesions, no rash, no ecchymosis   Left knee: no skin lesions, no rash, no ecchymosis    Neurologic:   Sensation:    Right foot: intact to light touch on the dorsal and plantar aspect    Left foot: intact to light touch on the dorsal and plantar aspect   Motor:  Right lower extremity: 5/5 quadriceps, hamstrings, ankle dorsiflexors, and ankle plantar flexors  Left lower extremity: 5/5 quadriceps, hamstrings, ankle dorsiflexors, and ankle plantar flexors  Vascular:   Right lower extremity: 2+ dorsalis pedis pulse, prompt capillary refill   Left lower extremity: 2+ dorsalis pedis pulse, prompt capillary refill    Lower Extremities:   Right Knee:    Tenderness:  Medial and lateral joint line tenderness    Effusion:  None    Swelling:  None    Crepitus:  Positive    Atrophy:  None    Range of motion:  Extension: 5°       Flexion: 110°  Instability:  No varus laxity, no valgus laxity, negative anterior drawer  Deformities:  Varus   Left Knee:    Tenderness:  Medial and lateral joint line tenderness    Effusion:  None    Swelling:  None    Crepitus: Positive    Atrophy:  None    Range of motion:  Extension: 5°       Flexion: 110°  Instability:  No varus laxity, no valgus laxity, negative anterior drawer  Deformities:  Varus      Imaging/Studies      Imaging Results (last 24 hours)     Procedure Component Value Units Date/Time    XR Knee 4+ View Bilateral " [[id number]] Resulted:  07/22/19 1222     Updated:  07/22/19 1223    Narrative:       Right Knee Radiographs  Indication: right knee pain  Views: Standing AP's and skiers of both knees, with lateral and sunrise   views of the right knee    Comparison: no prior studies available    Findings:   Bone-on-bone contact and tricompartmental fashion, with varus alignment,   loose bodies in the suprapatellar pouch, complete obliteration of the   joint spaces, with no acute bony abnormalities.    Impression: End-stage right knee osteoarthritis    Left Knee Radiographs  Indication: left knee pain  Views: Standing AP's and skiers of both knees, with lateral and sunrise   views of the left knee    Comparison: no prior studies available    Findings:   Bone-on-bone contact and tricompartmental fashion, with varus alignment,   complete obliteration of the joint spaces, with no acute bony   abnormalities.    Impression: End-stage left knee osteoarthritis.          Assessment and Plan     Jewels was seen today for pain and pain.    Diagnoses and all orders for this visit:    Primary osteoarthritis of right knee  -     XR Knee 4+ View Bilateral  -     Case Request; Standing  -     Instructions on coughing, deep breathing, and incentive spirometry.; Future  -     CBC and Differential; Future  -     Basic metabolic panel; Future  -     Protime-INR; Future  -     APTT; Future  -     Hemoglobin A1c; Future  -     Sedimentation rate; Future  -     C-reactive protein; Future  -     Urinalysis With Culture If Indicated -; Future  -     Tranexamic Acid 1,000 mg in sodium chloride 0.9 % 100 mL  -     Tranexamic Acid 1,000 mg in sodium chloride 0.9 % 100 mL  -     ceFAZolin (ANCEF) 2 g in sodium chloride 0.9 % 100 mL IVPB  -     acetaminophen (TYLENOL) tablet 975 mg  -     meloxicam (MOBIC) tablet 15 mg  -     pregabalin (LYRICA) capsule 150 mg  -     mupirocin (BACTROBAN) 2 % nasal ointment 1 application  -     Case Request    Primary  osteoarthritis of left knee  -     XR Knee 4+ View Bilateral    Other orders  -     Outpatient In A Bed; Standing  -     Follow Anesthesia Guidelines / Standing Orders; Future  -     Obtain informed consent  -     Provide instructions to patient regarding NPO status  -     Clorhexidine skin prep  -     Care Order / Instruction for all Female Patients  -     Follow Anesthesia Guidelines / Standing Orders; Standing  -     Nerve Block; Standing  -     Verify NPO Status; Standing  -     SCD (sequential compression device)- to be placed on patient in Pre-op; Standing  -     POC Glucose Once; Standing  -     Clip operative site; Standing  -     Obtain informed consent (if not collected inpatient or PAT); Standing  -     Notify Physician - Standard; Standing  -     NPO After Midnight  -     mupirocin (BACTROBAN NASAL) 2 % nasal ointment; into the nostril(s) as directed by provider 2 (Two) Times a Day.  -     chlorhexidine (HIBICLENS) 4 % external liquid; Apply  topically to the appropriate area as directed Daily. Shower with hibiclens solution as directed for 5 days prior to surgery        1. Primary osteoarthritis of right knee    2. Primary osteoarthritis of left knee        I reviewed my findings with patient today.  She has severe advanced bilateral knee arthritis, and she would like to consider knee replacement surgery, and would like to go ahead and schedule if possible.  She has exhausted conservative treatment options.  Please see my counseling note for details.  She would like to proceed with the right knee first.  She was inquiring about bilateral total knee replacement surgeries in the same sitting, and I discussed that the risks are increased with doing both knees at the same time.  I have some concerns about doing both knees at the same time in her situation, and she understands.    Surgical Counseling     I have informed the patient of the diagnosis and the prognosis.  Exhaustive conservative treatment  modalities have not resulted in long term pain relief.  The symptoms have progressed to the point of daily pain and inability to perform activities of daily living without significant pain. The patient has reached the point of desiring to proceed with total knee arthroplasty after discussing the risks, benefits and alternatives to the procedure.  The surgical procedure itself was discussed in detail. Risks of the procedure were discussed, which included but are not limited to, bleeding, infection, damage to blood vessels and nerves, incomplete pain relief, loosening of the prosthesis, deep infection, need for further surgery, loss of limb, deep venous thrombosis, pulmonary embolus, death, heart attack, stroke, kidney failure, liver failure, and anesthetic complications.  In addition, the potential for deep infection developing in the future was discussed, which could require further surgery.  The knee would have to be re-opened, debrided, and potentially remove the prosthesis, which may or may not be replaced in the future.  Also, the possibility for loosening of the prosthesis has been mentioned.  If the prosthesis loosened, a revision arthroplasty could be performed, with results that are not as predictable compared to the original procedure.  The typical rehabilitative course has also been discussed, and full recovery may take up to a year to see the maximum benefit.  The importance of patient cooperation in the rehabilitative efforts has also been discussed.  No guarantees whatsoever were given.  The patient understands the potential risks versus the benefits and desires to proceed with total knee arthroplasty at a mutually convenient time.    Return for For surgery as planned.      Medical Decision Making  Management Options : major surgery with risk factors  Data/Risk: radiology tests and independent visualization of imaging, lab tests, or EMG/NCV      Faustino Weinstein MD  07/22/19  12:38 PM

## 2019-08-06 ENCOUNTER — APPOINTMENT (OUTPATIENT)
Dept: PREADMISSION TESTING | Facility: HOSPITAL | Age: 79
End: 2019-08-06

## 2019-08-06 VITALS — WEIGHT: 195 LBS | HEIGHT: 67 IN | BODY MASS INDEX: 30.61 KG/M2

## 2019-08-06 DIAGNOSIS — M17.11 PRIMARY OSTEOARTHRITIS OF RIGHT KNEE: ICD-10-CM

## 2019-08-06 LAB
ANION GAP SERPL CALCULATED.3IONS-SCNC: 16 MMOL/L (ref 5–15)
APTT PPP: 29.5 SECONDS (ref 24–37)
BACTERIA UR QL AUTO: NORMAL /HPF
BASOPHILS # BLD AUTO: 0.05 10*3/MM3 (ref 0–0.2)
BASOPHILS NFR BLD AUTO: 0.8 % (ref 0–1.5)
BILIRUB UR QL STRIP: NEGATIVE
BUN BLD-MCNC: 17 MG/DL (ref 8–23)
BUN/CREAT SERPL: 27 (ref 7–25)
CALCIUM SPEC-SCNC: 10.4 MG/DL (ref 8.6–10.5)
CHLORIDE SERPL-SCNC: 99 MMOL/L (ref 98–107)
CLARITY UR: CLEAR
CO2 SERPL-SCNC: 26 MMOL/L (ref 22–29)
COLOR UR: YELLOW
CREAT BLD-MCNC: 0.63 MG/DL (ref 0.57–1)
CRP SERPL-MCNC: 0.24 MG/DL (ref 0–0.5)
DEPRECATED RDW RBC AUTO: 46.5 FL (ref 37–54)
EOSINOPHIL # BLD AUTO: 0.2 10*3/MM3 (ref 0–0.4)
EOSINOPHIL NFR BLD AUTO: 3.4 % (ref 0.3–6.2)
ERYTHROCYTE [DISTWIDTH] IN BLOOD BY AUTOMATED COUNT: 13.3 % (ref 12.3–15.4)
ERYTHROCYTE [SEDIMENTATION RATE] IN BLOOD: 28 MM/HR (ref 0–30)
GFR SERPL CREATININE-BSD FRML MDRD: 92 ML/MIN/1.73
GLUCOSE BLD-MCNC: 103 MG/DL (ref 65–99)
GLUCOSE UR STRIP-MCNC: NEGATIVE MG/DL
HBA1C MFR BLD: 5.5 % (ref 4.8–5.6)
HCT VFR BLD AUTO: 41.7 % (ref 34–46.6)
HGB BLD-MCNC: 13.5 G/DL (ref 12–15.9)
HGB UR QL STRIP.AUTO: NEGATIVE
HYALINE CASTS UR QL AUTO: NORMAL /LPF
IMM GRANULOCYTES # BLD AUTO: 0.01 10*3/MM3 (ref 0–0.05)
IMM GRANULOCYTES NFR BLD AUTO: 0.2 % (ref 0–0.5)
INR PPP: 0.97 (ref 0.85–1.16)
KETONES UR QL STRIP: NEGATIVE
LEUKOCYTE ESTERASE UR QL STRIP.AUTO: NEGATIVE
LYMPHOCYTES # BLD AUTO: 1.9 10*3/MM3 (ref 0.7–3.1)
LYMPHOCYTES NFR BLD AUTO: 32.3 % (ref 19.6–45.3)
MCH RBC QN AUTO: 30.6 PG (ref 26.6–33)
MCHC RBC AUTO-ENTMCNC: 32.4 G/DL (ref 31.5–35.7)
MCV RBC AUTO: 94.6 FL (ref 79–97)
MONOCYTES # BLD AUTO: 0.47 10*3/MM3 (ref 0.1–0.9)
MONOCYTES NFR BLD AUTO: 8 % (ref 5–12)
NEUTROPHILS # BLD AUTO: 3.26 10*3/MM3 (ref 1.7–7)
NEUTROPHILS NFR BLD AUTO: 55.3 % (ref 42.7–76)
NITRITE UR QL STRIP: NEGATIVE
NRBC BLD AUTO-RTO: 0 /100 WBC (ref 0–0.2)
PH UR STRIP.AUTO: 7 [PH] (ref 5–8)
PLATELET # BLD AUTO: 295 10*3/MM3 (ref 140–450)
PMV BLD AUTO: 8.8 FL (ref 6–12)
POTASSIUM BLD-SCNC: 3.8 MMOL/L (ref 3.5–5.2)
PROT UR QL STRIP: NEGATIVE
PROTHROMBIN TIME: 12.4 SECONDS (ref 11.2–14.3)
RBC # BLD AUTO: 4.41 10*6/MM3 (ref 3.77–5.28)
RBC # UR: NORMAL /HPF
REF LAB TEST METHOD: NORMAL
SODIUM BLD-SCNC: 141 MMOL/L (ref 136–145)
SP GR UR STRIP: 1.02 (ref 1–1.03)
SQUAMOUS #/AREA URNS HPF: NORMAL /HPF
UROBILINOGEN UR QL STRIP: NORMAL
WBC NRBC COR # BLD: 5.89 10*3/MM3 (ref 3.4–10.8)
WBC UR QL AUTO: NORMAL /HPF

## 2019-08-06 PROCEDURE — 81001 URINALYSIS AUTO W/SCOPE: CPT | Performed by: ORTHOPAEDIC SURGERY

## 2019-08-06 PROCEDURE — 93010 ELECTROCARDIOGRAM REPORT: CPT | Performed by: INTERNAL MEDICINE

## 2019-08-06 PROCEDURE — 85025 COMPLETE CBC W/AUTO DIFF WBC: CPT | Performed by: ORTHOPAEDIC SURGERY

## 2019-08-06 PROCEDURE — 86140 C-REACTIVE PROTEIN: CPT | Performed by: ORTHOPAEDIC SURGERY

## 2019-08-06 PROCEDURE — 83036 HEMOGLOBIN GLYCOSYLATED A1C: CPT | Performed by: ORTHOPAEDIC SURGERY

## 2019-08-06 PROCEDURE — 36415 COLL VENOUS BLD VENIPUNCTURE: CPT

## 2019-08-06 PROCEDURE — 85652 RBC SED RATE AUTOMATED: CPT | Performed by: ORTHOPAEDIC SURGERY

## 2019-08-06 PROCEDURE — 85610 PROTHROMBIN TIME: CPT | Performed by: ORTHOPAEDIC SURGERY

## 2019-08-06 PROCEDURE — 80048 BASIC METABOLIC PNL TOTAL CA: CPT | Performed by: ORTHOPAEDIC SURGERY

## 2019-08-06 PROCEDURE — 93005 ELECTROCARDIOGRAM TRACING: CPT

## 2019-08-06 PROCEDURE — 85730 THROMBOPLASTIN TIME PARTIAL: CPT | Performed by: ORTHOPAEDIC SURGERY

## 2019-08-06 ASSESSMENT — KOOS JR
KOOS JR SCORE: 28.251
KOOS JR SCORE: 23

## 2019-08-06 NOTE — PAT
EKG cleared by Dr. Carmona.     Verified with patient that they received a prescription for Bactroban and Chlorhexidine shower from the office.  Reinforced with them to fill the prescription if they haven't already.  Verbal and written instructions given regarding proper use of the Bactroban and Chlorhexidine to patient and/or famlily during PAT visit. Patient/family also instructed to complete checklist and return it to Pre-op on the day of surgery.  Patient and/or family verbalized understanding.    Patient to apply Chlorhexadine wipes  to surgical area (as instructed) the night before procedure and the AM of procedure. Wipes provided.    Per Anesthesia Request, patient instructed not to take their ACE/ARB medications on the AM of surgery.    Patient instructed to drink 20 ounces (or until full) of Gatorade and it needs to be completed 1 hour before given arrival time for procedure (NO RED Gatorade)    Patient verbalized understanding.    Patient attended joint replacement class today during PAT visit.    Patient passed memory screen today.

## 2019-08-06 NOTE — DISCHARGE INSTRUCTIONS
The following information and instructions were given:    Do not eat, drink, smoke or chew gum after midnight the night before surgery. This also includes no mints.  Take all routine, prescribed medications including heart and blood pressure medicines with a sip of water unless otherwise instructed by your physician.   Do NOT take diabetic medication unless instructed by your physician.    If you were instructed to drink 20 ounces (or until full) of Gatorade the morning of surgery, the Gatorade must be completed 1 hour before arrival time on the day of surgery .  No RED Gatorade.      DO NOT shave for two days before your procedure.  Do not wear makeup.      DO NOT wear fingernail polish (gel/regular) and/or acrylic/artificial nails on the day of surgery.   If you have had a recent manicure and would rather not remove polish or artificial nails, then the minimum requirement is that the polish/artificial nails must be removed from the middle finger on each hand.      If you are having surgery/procedure on an upper extremity, then fingernail polish/artificial fingernails must be removed for surgery.  NO EXCEPTIONS.      If you are having surgery/procedure on a lower extremity, then toenail polish on both extremities must be removed for surgery.  NO EXCEPTIONS.    Remove all jewelry (advise to go to jeweler if unable to remove).  Jewelry, especially rings, can no longer be taped for surgery.    Leave anything you consider valuable at home.    Leave your suitcase in the car until after your surgery.    Bring the following with you the day of your procedure (when applicable)       -picture ID and insurance cards       -Co-pay/deductible required by insurance       -Medications in the original bottles (not a list) including all over-the-counter medications if not brought to PAT       -Copy of advance directive, living will or power of  documents if not brought to PAT       -CPAP or BIPAP mask and tubing (do not  bring machine)       -Skin prep instruction(s) sheet       -PAT Pass    Education booklet, brochure, handout or binder related to procedure given to patient.    When applicable, an ERAS booklet was given to patient.    Pain Control After Surgery handout given to patient.    Respirex use (handout given to patient) and pneumonia prevention education provided.    Signs and Symptoms of infection discussed.    DVT Prevention education given.  Stressing the importance of ambulation.    Please apply Chlorhexadine wipes to surgical area (if instructed) the night before procedure and the AM of procedure and document date/time of applications on skin prep instruction sheet.

## 2019-08-19 NOTE — PROGRESS NOTES
"    Pre Op Ortho Assessment    [facility]     Patient Name: Jewels Venegas  MRN: [id number]  Today's Date: 8/19/2019        PRE-OPERATIVE ORTHOPEDIC ASSESSMENT     Row Name 08/19/19 0940       Question    What is your age group?  0    Gender?  1    How far on average can you walk? (a block is 200 meters)  0    Which gait aid do you use? (more often than not)  0    Do you use community supports: (home help, meals on wheels, district nursing)  1    Will you live with someone who can care for you after your operation?  0    Your Score (out of 12)  2       Discharge Disposition/Planning:    Discussed the predicted discharge disposition needed based on RAPT Assessment with the patient  No    Patient Selected Discharge Disposition:  SNF    Outpatient Rehabilitation Facility of Choice:  -- n/a    Home Health Services Preferred:  -- TBD    Post-operative Caregiver Name and Phone Number  Other has a daugher who works long hours,sister can bring her things       Subacute Inpatient Rehabilitation: Complete this section only if planning inpatient services at a sub-acute facility.     Subacute Facility Preferred  Other *see comments [facility]    Requires pre-certification for inpatient rehabilitation services?  Yes    If choosing inpatient services at an Acute or Subacute Facility please list a subsequent back-up plan (in case the patient fails to qualify for inpatient rehabilitation).  -- discussed alternate plan,she will check about the [facility]'s,but really wants [facility],if they are full may need to consider a [facility]       Home Equipment    Does patient have a walker for home use?  Yes    Does patient have a 3 in 1 commode for home use?  No    Does patient have a shower chair for home use?  Yes    Does patient have an elevated commode seat for home use?  Yes installed \"comfort height\" with hand rails    Does patient have a cane for home use?  Yes    Is there any other medical equipment in the home?  No       Pre-Operative " Class Attendance    Attended or scheduled to attend the pre-operative class within 1 year of total joint replacement?  Yes attended on 8/6/19       Patient Education    Expected time of discharge discussed  Yes    Encouraged to attend pre-operative class  Yes    Education regarding predicted discharge disposition based on RAPT score  No    Patient receptive and voiced understanding of information given  Yes        Lenghty discussion by phone with Ms Venegas re: d/c plan. She lives alone, has no one to assist.She is currently homebound due to her poor mobility. Her daughter works long hours, has a sister who can help bring groceries,etc,but is not very mobile herself. She has equipment needed. She really wants to go to [facility], I explained that her insurance will require pre authorization and it could be denied. We discussed alternate SNF if [facility] was full, I could not get a definite answer on that.She has had [facility] in past,she did not think HH was enough PT, only twice per week. We discussed that her LOS stay would be 1-2 days. She was agreeable to a pre admission referral to [facility].I spoke with Bowen at [facility] who accepted referral. No other questions verbalized.  Sonja C Kellerman, RN

## 2019-08-20 ENCOUNTER — ANESTHESIA EVENT (OUTPATIENT)
Dept: PERIOP | Facility: HOSPITAL | Age: 79
End: 2019-08-20

## 2019-08-20 ENCOUNTER — HOSPITAL ENCOUNTER (INPATIENT)
Facility: HOSPITAL | Age: 79
LOS: 3 days | Discharge: HOME | End: 2019-08-23
Attending: ORTHOPAEDIC SURGERY | Admitting: ORTHOPAEDIC SURGERY

## 2019-08-20 ENCOUNTER — ANESTHESIA (OUTPATIENT)
Dept: PERIOP | Facility: HOSPITAL | Age: 79
End: 2019-08-20

## 2019-08-20 ENCOUNTER — APPOINTMENT (OUTPATIENT)
Dept: GENERAL RADIOLOGY | Facility: HOSPITAL | Age: 79
End: 2019-08-20

## 2019-08-20 DIAGNOSIS — Z91.81 AT HIGH RISK FOR FALLS: ICD-10-CM

## 2019-08-20 DIAGNOSIS — Z74.09 IMPAIRED MOBILITY AND ADLS: ICD-10-CM

## 2019-08-20 DIAGNOSIS — Z74.09 IMPAIRED FUNCTIONAL MOBILITY, BALANCE, GAIT, AND ENDURANCE: Primary | ICD-10-CM

## 2019-08-20 DIAGNOSIS — M17.0 PRIMARY OSTEOARTHRITIS OF BOTH KNEES: ICD-10-CM

## 2019-08-20 DIAGNOSIS — Z78.9 IMPAIRED MOBILITY AND ADLS: ICD-10-CM

## 2019-08-20 DIAGNOSIS — M17.11 PRIMARY OSTEOARTHRITIS OF RIGHT KNEE: ICD-10-CM

## 2019-08-20 DIAGNOSIS — Z96.651 STATUS POST TOTAL RIGHT KNEE REPLACEMENT: ICD-10-CM

## 2019-08-20 PROBLEM — E78.5 HLD (HYPERLIPIDEMIA): Status: ACTIVE | Noted: 2019-08-20

## 2019-08-20 PROBLEM — E03.9 HYPOTHYROID: Status: ACTIVE | Noted: 2019-08-20

## 2019-08-20 PROBLEM — I10 HTN (HYPERTENSION): Status: ACTIVE | Noted: 2019-08-20

## 2019-08-20 LAB — POTASSIUM BLD-SCNC: 3.7 MMOL/L (ref 3.5–5.2)

## 2019-08-20 PROCEDURE — A9270 NON-COVERED ITEM OR SERVICE: HCPCS | Performed by: ORTHOPAEDIC SURGERY

## 2019-08-20 PROCEDURE — 25010000002 ROPIVACAINE PER 1 MG: Performed by: ORTHOPAEDIC SURGERY

## 2019-08-20 PROCEDURE — 84132 ASSAY OF SERUM POTASSIUM: CPT | Performed by: ANESTHESIOLOGY

## 2019-08-20 PROCEDURE — 25010000003 LIDOCAINE 1 % SOLUTION: Performed by: NURSE ANESTHETIST, CERTIFIED REGISTERED

## 2019-08-20 PROCEDURE — 25010000002 FENTANYL CITRATE (PF) 100 MCG/2ML SOLUTION: Performed by: NURSE ANESTHETIST, CERTIFIED REGISTERED

## 2019-08-20 PROCEDURE — 0SRC0J9 REPLACEMENT OF RIGHT KNEE JOINT WITH SYNTHETIC SUBSTITUTE, CEMENTED, OPEN APPROACH: ICD-10-PCS | Performed by: ORTHOPAEDIC SURGERY

## 2019-08-20 PROCEDURE — 25010000003 CEFAZOLIN IN DEXTROSE 2-4 GM/100ML-% SOLUTION: Performed by: ORTHOPAEDIC SURGERY

## 2019-08-20 PROCEDURE — C1776 JOINT DEVICE (IMPLANTABLE): HCPCS | Performed by: ORTHOPAEDIC SURGERY

## 2019-08-20 PROCEDURE — 25010000002 ONDANSETRON PER 1 MG: Performed by: NURSE ANESTHETIST, CERTIFIED REGISTERED

## 2019-08-20 PROCEDURE — 25010000002 ONDANSETRON PER 1 MG: Performed by: ORTHOPAEDIC SURGERY

## 2019-08-20 PROCEDURE — 25010000002 PROPOFOL 1000 MG/ML EMULSION: Performed by: NURSE ANESTHETIST, CERTIFIED REGISTERED

## 2019-08-20 PROCEDURE — 25010000002 ROPIVACAINE PER 1 MG: Performed by: NURSE ANESTHETIST, CERTIFIED REGISTERED

## 2019-08-20 PROCEDURE — 25010000002 PROMETHAZINE PER 50 MG: Performed by: NURSE ANESTHETIST, CERTIFIED REGISTERED

## 2019-08-20 PROCEDURE — 63710000001 MUPIROCIN 2 % OINTMENT: Performed by: ORTHOPAEDIC SURGERY

## 2019-08-20 PROCEDURE — 63710000001 MELOXICAM 15 MG TABLET: Performed by: ORTHOPAEDIC SURGERY

## 2019-08-20 PROCEDURE — 63710000001 PREGABALIN 150 MG CAPSULE: Performed by: ORTHOPAEDIC SURGERY

## 2019-08-20 PROCEDURE — 97116 GAIT TRAINING THERAPY: CPT

## 2019-08-20 PROCEDURE — 25010000002 DEXAMETHASONE SODIUM PHOSPHATE 10 MG/ML SOLUTION: Performed by: ANESTHESIOLOGY

## 2019-08-20 PROCEDURE — 27447 TOTAL KNEE ARTHROPLASTY: CPT | Performed by: ORTHOPAEDIC SURGERY

## 2019-08-20 PROCEDURE — 97162 PT EVAL MOD COMPLEX 30 MIN: CPT

## 2019-08-20 PROCEDURE — 25010000002 PROPOFOL 10 MG/ML EMULSION: Performed by: NURSE ANESTHETIST, CERTIFIED REGISTERED

## 2019-08-20 PROCEDURE — 63710000001 FAMOTIDINE 20 MG TABLET: Performed by: ANESTHESIOLOGY

## 2019-08-20 PROCEDURE — 25010000002 BUPRENORPHINE PER 0.1 MG: Performed by: ANESTHESIOLOGY

## 2019-08-20 PROCEDURE — C1713 ANCHOR/SCREW BN/BN,TIS/BN: HCPCS | Performed by: ORTHOPAEDIC SURGERY

## 2019-08-20 PROCEDURE — 63710000001 ACETAMINOPHEN 500 MG TABLET: Performed by: ORTHOPAEDIC SURGERY

## 2019-08-20 PROCEDURE — A9270 NON-COVERED ITEM OR SERVICE: HCPCS | Performed by: ANESTHESIOLOGY

## 2019-08-20 PROCEDURE — 25010000002 DEXAMETHASONE PER 1 MG: Performed by: NURSE ANESTHETIST, CERTIFIED REGISTERED

## 2019-08-20 PROCEDURE — 73560 X-RAY EXAM OF KNEE 1 OR 2: CPT

## 2019-08-20 DEVICE — CMT BONE SIMPLEX/P FULL DOSE 10/PK: Type: IMPLANTABLE DEVICE | Site: KNEE | Status: FUNCTIONAL

## 2019-08-20 DEVICE — IMPLANTABLE DEVICE: Type: IMPLANTABLE DEVICE | Site: KNEE | Status: FUNCTIONAL

## 2019-08-20 DEVICE — LEGION NARROW POSTERIOR STABILIZED                                    OXINIUM SIZE 6N RIGHT
Type: IMPLANTABLE DEVICE | Site: KNEE | Status: FUNCTIONAL
Brand: LEGION

## 2019-08-20 DEVICE — GENESIS II NON-POROUS TIBIAL                                    BASEPLATE SIZE 4 RIGHT
Type: IMPLANTABLE DEVICE | Site: KNEE | Status: FUNCTIONAL
Brand: GENESIS II

## 2019-08-20 DEVICE — GENESIS II RESURFACING PATELLAR                                    PROSTHESIS  32MM
Type: IMPLANTABLE DEVICE | Site: KNEE | Status: FUNCTIONAL
Brand: GENESIS II

## 2019-08-20 DEVICE — LEGION PS HIGH FLEX XLPE SZ 3-4 10MM
Type: IMPLANTABLE DEVICE | Site: KNEE | Status: FUNCTIONAL
Brand: LEGION

## 2019-08-20 RX ORDER — PREGABALIN 150 MG/1
150 CAPSULE ORAL ONCE
Status: COMPLETED | OUTPATIENT
Start: 2019-08-20 | End: 2019-08-20

## 2019-08-20 RX ORDER — SODIUM CHLORIDE 0.9 % (FLUSH) 0.9 %
1-10 SYRINGE (ML) INJECTION AS NEEDED
Status: DISCONTINUED | OUTPATIENT
Start: 2019-08-20 | End: 2019-08-23 | Stop reason: HOSPADM

## 2019-08-20 RX ORDER — DOCUSATE SODIUM 100 MG/1
100 CAPSULE, LIQUID FILLED ORAL 2 TIMES DAILY PRN
Status: DISCONTINUED | OUTPATIENT
Start: 2019-08-20 | End: 2019-08-23 | Stop reason: HOSPADM

## 2019-08-20 RX ORDER — BUPIVACAINE HYDROCHLORIDE 2.5 MG/ML
INJECTION, SOLUTION EPIDURAL; INFILTRATION; INTRACAUDAL
Status: DISCONTINUED | OUTPATIENT
Start: 2019-08-20 | End: 2019-08-20 | Stop reason: SURG

## 2019-08-20 RX ORDER — BUPRENORPHINE HYDROCHLORIDE 0.32 MG/ML
INJECTION INTRAMUSCULAR; INTRAVENOUS
Status: COMPLETED | OUTPATIENT
Start: 2019-08-20 | End: 2019-08-20

## 2019-08-20 RX ORDER — MELOXICAM 7.5 MG/1
15 TABLET ORAL DAILY
Status: DISCONTINUED | OUTPATIENT
Start: 2019-08-21 | End: 2019-08-23 | Stop reason: HOSPADM

## 2019-08-20 RX ORDER — ONDANSETRON 2 MG/ML
4 INJECTION INTRAMUSCULAR; INTRAVENOUS EVERY 6 HOURS PRN
Status: DISCONTINUED | OUTPATIENT
Start: 2019-08-20 | End: 2019-08-23 | Stop reason: HOSPADM

## 2019-08-20 RX ORDER — LABETALOL HYDROCHLORIDE 5 MG/ML
10 INJECTION, SOLUTION INTRAVENOUS EVERY 4 HOURS PRN
Status: DISCONTINUED | OUTPATIENT
Start: 2019-08-20 | End: 2019-08-23 | Stop reason: HOSPADM

## 2019-08-20 RX ORDER — PROMETHAZINE HYDROCHLORIDE 25 MG/1
25 TABLET ORAL ONCE AS NEEDED
Status: COMPLETED | OUTPATIENT
Start: 2019-08-20 | End: 2019-08-20

## 2019-08-20 RX ORDER — HYDROCODONE BITARTRATE AND ACETAMINOPHEN 5; 325 MG/1; MG/1
1 TABLET ORAL EVERY 4 HOURS PRN
Status: DISCONTINUED | OUTPATIENT
Start: 2019-08-20 | End: 2019-08-23 | Stop reason: HOSPADM

## 2019-08-20 RX ORDER — AMLODIPINE BESYLATE 5 MG/1
5 TABLET ORAL DAILY
Status: DISCONTINUED | OUTPATIENT
Start: 2019-08-21 | End: 2019-08-23 | Stop reason: HOSPADM

## 2019-08-20 RX ORDER — ESMOLOL HYDROCHLORIDE 10 MG/ML
INJECTION INTRAVENOUS AS NEEDED
Status: DISCONTINUED | OUTPATIENT
Start: 2019-08-20 | End: 2019-08-20 | Stop reason: SURG

## 2019-08-20 RX ORDER — PROPOFOL 10 MG/ML
VIAL (ML) INTRAVENOUS AS NEEDED
Status: DISCONTINUED | OUTPATIENT
Start: 2019-08-20 | End: 2019-08-20 | Stop reason: SURG

## 2019-08-20 RX ORDER — HYDROMORPHONE HYDROCHLORIDE 1 MG/ML
0.5 INJECTION, SOLUTION INTRAMUSCULAR; INTRAVENOUS; SUBCUTANEOUS
Status: DISCONTINUED | OUTPATIENT
Start: 2019-08-20 | End: 2019-08-23 | Stop reason: HOSPADM

## 2019-08-20 RX ORDER — ROPIVACAINE HYDROCHLORIDE 5 MG/ML
INJECTION, SOLUTION EPIDURAL; INFILTRATION; PERINEURAL AS NEEDED
Status: DISCONTINUED | OUTPATIENT
Start: 2019-08-20 | End: 2019-08-20 | Stop reason: HOSPADM

## 2019-08-20 RX ORDER — FENTANYL CITRATE 50 UG/ML
50 INJECTION, SOLUTION INTRAMUSCULAR; INTRAVENOUS
Status: DISCONTINUED | OUTPATIENT
Start: 2019-08-20 | End: 2019-08-23 | Stop reason: HOSPADM

## 2019-08-20 RX ORDER — PROMETHAZINE HYDROCHLORIDE 25 MG/1
25 SUPPOSITORY RECTAL ONCE AS NEEDED
Status: COMPLETED | OUTPATIENT
Start: 2019-08-20 | End: 2019-08-20

## 2019-08-20 RX ORDER — FENTANYL CITRATE 50 UG/ML
INJECTION, SOLUTION INTRAMUSCULAR; INTRAVENOUS AS NEEDED
Status: DISCONTINUED | OUTPATIENT
Start: 2019-08-20 | End: 2019-08-20 | Stop reason: SURG

## 2019-08-20 RX ORDER — LIDOCAINE HYDROCHLORIDE 10 MG/ML
0.5 INJECTION, SOLUTION EPIDURAL; INFILTRATION; INTRACAUDAL; PERINEURAL ONCE AS NEEDED
Status: COMPLETED | OUTPATIENT
Start: 2019-08-20 | End: 2019-08-20

## 2019-08-20 RX ORDER — ATROPINE SULFATE 1 MG/ML
0.5 INJECTION, SOLUTION INTRAMUSCULAR; INTRAVENOUS; SUBCUTANEOUS ONCE AS NEEDED
Status: DISCONTINUED | OUTPATIENT
Start: 2019-08-20 | End: 2019-08-23 | Stop reason: HOSPADM

## 2019-08-20 RX ORDER — SODIUM CHLORIDE 0.9 % (FLUSH) 0.9 %
3 SYRINGE (ML) INJECTION EVERY 12 HOURS SCHEDULED
Status: CANCELLED | OUTPATIENT
Start: 2019-08-20

## 2019-08-20 RX ORDER — CEFAZOLIN SODIUM 2 G/100ML
2 INJECTION, SOLUTION INTRAVENOUS ONCE
Status: COMPLETED | OUTPATIENT
Start: 2019-08-20 | End: 2019-08-20

## 2019-08-20 RX ORDER — LEVOTHYROXINE SODIUM 112 UG/1
112 TABLET ORAL DAILY
Status: DISCONTINUED | OUTPATIENT
Start: 2019-08-21 | End: 2019-08-23 | Stop reason: HOSPADM

## 2019-08-20 RX ORDER — PROMETHAZINE HYDROCHLORIDE 25 MG/ML
6.25 INJECTION, SOLUTION INTRAMUSCULAR; INTRAVENOUS ONCE AS NEEDED
Status: COMPLETED | OUTPATIENT
Start: 2019-08-20 | End: 2019-08-20

## 2019-08-20 RX ORDER — SODIUM CHLORIDE, SODIUM LACTATE, POTASSIUM CHLORIDE, CALCIUM CHLORIDE 600; 310; 30; 20 MG/100ML; MG/100ML; MG/100ML; MG/100ML
9 INJECTION, SOLUTION INTRAVENOUS CONTINUOUS
Status: DISCONTINUED | OUTPATIENT
Start: 2019-08-20 | End: 2019-08-20

## 2019-08-20 RX ORDER — MELOXICAM 15 MG/1
15 TABLET ORAL ONCE
Status: COMPLETED | OUTPATIENT
Start: 2019-08-20 | End: 2019-08-20

## 2019-08-20 RX ORDER — ATRACURIUM BESYLATE 10 MG/ML
INJECTION, SOLUTION INTRAVENOUS AS NEEDED
Status: DISCONTINUED | OUTPATIENT
Start: 2019-08-20 | End: 2019-08-20 | Stop reason: SURG

## 2019-08-20 RX ORDER — ONDANSETRON 2 MG/ML
INJECTION INTRAMUSCULAR; INTRAVENOUS AS NEEDED
Status: DISCONTINUED | OUTPATIENT
Start: 2019-08-20 | End: 2019-08-20 | Stop reason: SURG

## 2019-08-20 RX ORDER — ASPIRIN 325 MG
325 TABLET, DELAYED RELEASE (ENTERIC COATED) ORAL DAILY
Status: DISCONTINUED | OUTPATIENT
Start: 2019-08-20 | End: 2019-08-23 | Stop reason: HOSPADM

## 2019-08-20 RX ORDER — SODIUM CHLORIDE 0.9 % (FLUSH) 0.9 %
3-10 SYRINGE (ML) INJECTION AS NEEDED
Status: CANCELLED | OUTPATIENT
Start: 2019-08-20

## 2019-08-20 RX ORDER — LABETALOL HYDROCHLORIDE 5 MG/ML
INJECTION, SOLUTION INTRAVENOUS AS NEEDED
Status: DISCONTINUED | OUTPATIENT
Start: 2019-08-20 | End: 2019-08-20 | Stop reason: SURG

## 2019-08-20 RX ORDER — PROCHLORPERAZINE MALEATE 5 MG/1
5 TABLET ORAL EVERY 6 HOURS PRN
Status: DISCONTINUED | OUTPATIENT
Start: 2019-08-20 | End: 2019-08-23 | Stop reason: HOSPADM

## 2019-08-20 RX ORDER — CEFAZOLIN SODIUM 2 G/100ML
2 INJECTION, SOLUTION INTRAVENOUS EVERY 8 HOURS
Status: COMPLETED | OUTPATIENT
Start: 2019-08-20 | End: 2019-08-21

## 2019-08-20 RX ORDER — BISACODYL 10 MG
10 SUPPOSITORY, RECTAL RECTAL DAILY PRN
Status: DISCONTINUED | OUTPATIENT
Start: 2019-08-20 | End: 2019-08-23 | Stop reason: HOSPADM

## 2019-08-20 RX ORDER — LISINOPRIL 20 MG/1
20 TABLET ORAL
Status: DISCONTINUED | OUTPATIENT
Start: 2019-08-20 | End: 2019-08-23 | Stop reason: HOSPADM

## 2019-08-20 RX ORDER — LIDOCAINE HYDROCHLORIDE 10 MG/ML
INJECTION, SOLUTION INFILTRATION; PERINEURAL AS NEEDED
Status: DISCONTINUED | OUTPATIENT
Start: 2019-08-20 | End: 2019-08-20 | Stop reason: SURG

## 2019-08-20 RX ORDER — EPHEDRINE SULFATE 50 MG/ML
5 INJECTION, SOLUTION INTRAVENOUS ONCE AS NEEDED
Status: DISCONTINUED | OUTPATIENT
Start: 2019-08-20 | End: 2019-08-23 | Stop reason: HOSPADM

## 2019-08-20 RX ORDER — BISACODYL 5 MG/1
10 TABLET, DELAYED RELEASE ORAL DAILY PRN
Status: DISCONTINUED | OUTPATIENT
Start: 2019-08-20 | End: 2019-08-23 | Stop reason: HOSPADM

## 2019-08-20 RX ORDER — NALOXONE HCL 0.4 MG/ML
0.1 VIAL (ML) INJECTION
Status: DISCONTINUED | OUTPATIENT
Start: 2019-08-20 | End: 2019-08-23 | Stop reason: HOSPADM

## 2019-08-20 RX ORDER — ACETAMINOPHEN 650 MG/1
650 SUPPOSITORY RECTAL EVERY 4 HOURS PRN
Status: DISCONTINUED | OUTPATIENT
Start: 2019-08-20 | End: 2019-08-23 | Stop reason: HOSPADM

## 2019-08-20 RX ORDER — ATORVASTATIN CALCIUM 10 MG/1
10 TABLET, FILM COATED ORAL 2 TIMES WEEKLY
Status: DISCONTINUED | OUTPATIENT
Start: 2019-08-22 | End: 2019-08-23 | Stop reason: HOSPADM

## 2019-08-20 RX ORDER — MORPHINE SULFATE 4 MG/ML
4 INJECTION, SOLUTION INTRAMUSCULAR; INTRAVENOUS
Status: DISCONTINUED | OUTPATIENT
Start: 2019-08-20 | End: 2019-08-23 | Stop reason: HOSPADM

## 2019-08-20 RX ORDER — DEXAMETHASONE SODIUM PHOSPHATE 10 MG/ML
INJECTION, SOLUTION INTRAMUSCULAR; INTRAVENOUS
Status: COMPLETED | OUTPATIENT
Start: 2019-08-20 | End: 2019-08-20

## 2019-08-20 RX ORDER — ONDANSETRON 4 MG/1
4 TABLET, FILM COATED ORAL EVERY 6 HOURS PRN
Status: DISCONTINUED | OUTPATIENT
Start: 2019-08-20 | End: 2019-08-23 | Stop reason: HOSPADM

## 2019-08-20 RX ORDER — ACETAMINOPHEN 325 MG/1
650 TABLET ORAL EVERY 4 HOURS PRN
Status: DISCONTINUED | OUTPATIENT
Start: 2019-08-20 | End: 2019-08-23 | Stop reason: HOSPADM

## 2019-08-20 RX ORDER — MAGNESIUM HYDROXIDE 1200 MG/15ML
LIQUID ORAL AS NEEDED
Status: DISCONTINUED | OUTPATIENT
Start: 2019-08-20 | End: 2019-08-20 | Stop reason: HOSPADM

## 2019-08-20 RX ORDER — ACETAMINOPHEN 160 MG/5ML
650 SOLUTION ORAL EVERY 4 HOURS PRN
Status: DISCONTINUED | OUTPATIENT
Start: 2019-08-20 | End: 2019-08-23 | Stop reason: HOSPADM

## 2019-08-20 RX ORDER — NALOXONE HCL 0.4 MG/ML
0.4 VIAL (ML) INJECTION
Status: DISCONTINUED | OUTPATIENT
Start: 2019-08-20 | End: 2019-08-23 | Stop reason: HOSPADM

## 2019-08-20 RX ORDER — SODIUM CHLORIDE 0.9 % (FLUSH) 0.9 %
3 SYRINGE (ML) INJECTION EVERY 12 HOURS SCHEDULED
Status: DISCONTINUED | OUTPATIENT
Start: 2019-08-20 | End: 2019-08-23 | Stop reason: HOSPADM

## 2019-08-20 RX ORDER — FAMOTIDINE 10 MG/ML
20 INJECTION, SOLUTION INTRAVENOUS ONCE
Status: CANCELLED | OUTPATIENT
Start: 2019-08-20 | End: 2019-08-20

## 2019-08-20 RX ORDER — FAMOTIDINE 20 MG/1
20 TABLET, FILM COATED ORAL ONCE
Status: COMPLETED | OUTPATIENT
Start: 2019-08-20 | End: 2019-08-20

## 2019-08-20 RX ORDER — ACETAMINOPHEN 500 MG
1000 TABLET ORAL ONCE
Status: COMPLETED | OUTPATIENT
Start: 2019-08-20 | End: 2019-08-20

## 2019-08-20 RX ORDER — BUPIVACAINE HYDROCHLORIDE 2.5 MG/ML
INJECTION, SOLUTION EPIDURAL; INFILTRATION; INTRACAUDAL
Status: COMPLETED | OUTPATIENT
Start: 2019-08-20 | End: 2019-08-20

## 2019-08-20 RX ORDER — PROCHLORPERAZINE 25 MG
25 SUPPOSITORY, RECTAL RECTAL EVERY 12 HOURS PRN
Status: DISCONTINUED | OUTPATIENT
Start: 2019-08-20 | End: 2019-08-23 | Stop reason: HOSPADM

## 2019-08-20 RX ORDER — PROCHLORPERAZINE EDISYLATE 5 MG/ML
5 INJECTION INTRAMUSCULAR; INTRAVENOUS EVERY 6 HOURS PRN
Status: DISCONTINUED | OUTPATIENT
Start: 2019-08-20 | End: 2019-08-23 | Stop reason: HOSPADM

## 2019-08-20 RX ORDER — DEXAMETHASONE SODIUM PHOSPHATE 4 MG/ML
INJECTION, SOLUTION INTRA-ARTICULAR; INTRALESIONAL; INTRAMUSCULAR; INTRAVENOUS; SOFT TISSUE AS NEEDED
Status: DISCONTINUED | OUTPATIENT
Start: 2019-08-20 | End: 2019-08-20 | Stop reason: SURG

## 2019-08-20 RX ORDER — SODIUM CHLORIDE 9 MG/ML
120 INJECTION, SOLUTION INTRAVENOUS CONTINUOUS
Status: DISCONTINUED | OUTPATIENT
Start: 2019-08-20 | End: 2019-08-23 | Stop reason: HOSPADM

## 2019-08-20 RX ORDER — METHOCARBAMOL 750 MG/1
750 TABLET, FILM COATED ORAL 2 TIMES DAILY PRN
Status: DISCONTINUED | OUTPATIENT
Start: 2019-08-20 | End: 2019-08-23 | Stop reason: HOSPADM

## 2019-08-20 RX ADMIN — FENTANYL CITRATE 100 MCG: 50 INJECTION, SOLUTION INTRAMUSCULAR; INTRAVENOUS at 11:56

## 2019-08-20 RX ADMIN — MELOXICAM 15 MG: 15 TABLET ORAL at 08:42

## 2019-08-20 RX ADMIN — PREGABALIN 150 MG: 150 CAPSULE ORAL at 08:42

## 2019-08-20 RX ADMIN — PROPOFOL 25 MCG/KG/MIN: 10 INJECTION, EMULSION INTRAVENOUS at 10:31

## 2019-08-20 RX ADMIN — ACETAMINOPHEN 1000 MG: 500 TABLET ORAL at 08:42

## 2019-08-20 RX ADMIN — BUPRENORPHINE HYDROCHLORIDE 0.3 MG: 0.32 INJECTION INTRAMUSCULAR; INTRAVENOUS at 10:25

## 2019-08-20 RX ADMIN — SODIUM CHLORIDE 120 ML/HR: 9 INJECTION, SOLUTION INTRAVENOUS at 13:37

## 2019-08-20 RX ADMIN — BUPIVACAINE HYDROCHLORIDE 20 ML: 2.5 INJECTION, SOLUTION EPIDURAL; INFILTRATION; INTRACAUDAL; PERINEURAL at 10:25

## 2019-08-20 RX ADMIN — SODIUM CHLORIDE, POTASSIUM CHLORIDE, SODIUM LACTATE AND CALCIUM CHLORIDE 9 ML/HR: 600; 310; 30; 20 INJECTION, SOLUTION INTRAVENOUS at 08:20

## 2019-08-20 RX ADMIN — ESMOLOL HYDROCHLORIDE 50 MG: 10 INJECTION INTRAVENOUS at 10:44

## 2019-08-20 RX ADMIN — HYDROCODONE BITARTRATE AND ACETAMINOPHEN 1 TABLET: 5; 325 TABLET ORAL at 14:17

## 2019-08-20 RX ADMIN — BUPIVACAINE HYDROCHLORIDE 10 ML: 2.5 INJECTION, SOLUTION EPIDURAL; INFILTRATION; INTRACAUDAL; PERINEURAL at 12:38

## 2019-08-20 RX ADMIN — DEXAMETHASONE SODIUM PHOSPHATE 8 MG: 4 INJECTION, SOLUTION INTRAMUSCULAR; INTRAVENOUS at 10:21

## 2019-08-20 RX ADMIN — HYDROCODONE BITARTRATE AND ACETAMINOPHEN 1 TABLET: 5; 325 TABLET ORAL at 21:56

## 2019-08-20 RX ADMIN — DEXAMETHASONE SODIUM PHOSPHATE 2 MG: 10 INJECTION INTRAMUSCULAR; INTRAVENOUS at 10:25

## 2019-08-20 RX ADMIN — HYDROCODONE BITARTRATE AND ACETAMINOPHEN 1 TABLET: 5; 325 TABLET ORAL at 17:49

## 2019-08-20 RX ADMIN — ONDANSETRON 4 MG: 2 INJECTION INTRAMUSCULAR; INTRAVENOUS at 14:45

## 2019-08-20 RX ADMIN — LABETALOL HYDROCHLORIDE 10 MG: 5 INJECTION, SOLUTION INTRAVENOUS at 10:45

## 2019-08-20 RX ADMIN — LIDOCAINE HYDROCHLORIDE 50 MG: 10 INJECTION, SOLUTION INFILTRATION; PERINEURAL at 10:21

## 2019-08-20 RX ADMIN — PROPOFOL 150 MG: 10 INJECTION, EMULSION INTRAVENOUS at 10:21

## 2019-08-20 RX ADMIN — ROPIVACAINE HYDROCHLORIDE 10 ML/HR: 5 INJECTION, SOLUTION EPIDURAL; INFILTRATION; PERINEURAL at 12:45

## 2019-08-20 RX ADMIN — PROMETHAZINE HYDROCHLORIDE 6.25 MG: 25 INJECTION INTRAMUSCULAR; INTRAVENOUS at 13:06

## 2019-08-20 RX ADMIN — TRANEXAMIC ACID 1000 MG: 100 INJECTION, SOLUTION INTRAVENOUS at 10:30

## 2019-08-20 RX ADMIN — FENTANYL CITRATE 50 MCG: 0.05 INJECTION, SOLUTION INTRAMUSCULAR; INTRAVENOUS at 13:10

## 2019-08-20 RX ADMIN — METHOCARBAMOL 750 MG: 750 TABLET, FILM COATED ORAL at 21:56

## 2019-08-20 RX ADMIN — LISINOPRIL 20 MG: 20 TABLET ORAL at 15:49

## 2019-08-20 RX ADMIN — PROPOFOL 50 MG: 10 INJECTION, EMULSION INTRAVENOUS at 10:29

## 2019-08-20 RX ADMIN — CEFAZOLIN SODIUM 2 G: 2 INJECTION, SOLUTION INTRAVENOUS at 17:47

## 2019-08-20 RX ADMIN — SODIUM CHLORIDE, PRESERVATIVE FREE 3 ML: 5 INJECTION INTRAVENOUS at 15:50

## 2019-08-20 RX ADMIN — DOCUSATE SODIUM 100 MG: 100 CAPSULE, LIQUID FILLED ORAL at 20:44

## 2019-08-20 RX ADMIN — LIDOCAINE HYDROCHLORIDE 0.2 ML: 10 INJECTION, SOLUTION EPIDURAL; INFILTRATION; INTRACAUDAL; PERINEURAL at 08:20

## 2019-08-20 RX ADMIN — ATRACURIUM BESYLATE 30 MG: 10 INJECTION, SOLUTION INTRAVENOUS at 10:21

## 2019-08-20 RX ADMIN — TRANEXAMIC ACID 1000 MG: 100 INJECTION, SOLUTION INTRAVENOUS at 11:28

## 2019-08-20 RX ADMIN — ESMOLOL HYDROCHLORIDE 50 MG: 10 INJECTION INTRAVENOUS at 10:21

## 2019-08-20 RX ADMIN — SODIUM CHLORIDE 120 ML/HR: 9 INJECTION, SOLUTION INTRAVENOUS at 13:11

## 2019-08-20 RX ADMIN — SODIUM CHLORIDE, PRESERVATIVE FREE 3 ML: 5 INJECTION INTRAVENOUS at 20:44

## 2019-08-20 RX ADMIN — MUPIROCIN 1 APPLICATION: 20 OINTMENT TOPICAL at 08:44

## 2019-08-20 RX ADMIN — ONDANSETRON 4 MG: 2 INJECTION INTRAMUSCULAR; INTRAVENOUS at 12:05

## 2019-08-20 RX ADMIN — FAMOTIDINE 20 MG: 20 TABLET ORAL at 08:42

## 2019-08-20 RX ADMIN — CEFAZOLIN SODIUM 2 G: 2 INJECTION, SOLUTION INTRAVENOUS at 10:17

## 2019-08-20 RX ADMIN — ASPIRIN 325 MG: 325 TABLET, DELAYED RELEASE ORAL at 15:49

## 2019-08-20 NOTE — ANESTHESIA PROCEDURE NOTES
Peripheral Block      Patient reassessed immediately prior to procedure    Patient location during procedure: post-op  Start time: 8/20/2019 10:25 AM  Stop time: 8/20/2019 10:27 AM  Reason for block: at surgeon's request and post-op pain management  Performed by  CRNA: Bradley Harrington, CRNA  Assisted by: Jorge Balbuena RN  Preanesthetic Checklist  Completed: patient identified, site marked, surgical consent, pre-op evaluation, timeout performed, IV checked, risks and benefits discussed and monitors and equipment checked  Prep:  Pt Position: supine  Sterile barriers:cap, gloves, mask and sterile barriers  Prep: ChloraPrep  Patient monitoring: blood pressure monitoring, continuous pulse oximetry and EKG  Procedure  Sedation:no  Performed under: general  Guidance:ultrasound guided  Images:still images obtained, printed/placed on chart    Laterality:right  Block Type:adductor canal block  Injection Technique:single-shot  Needle Type:echogenic and long-bevel  Needle Gauge:20 G  Resistance on Injection: none  Catheter size: 20g.    Medications Used: buprenorphine (BUPRENEX) injection, 0.3 mg  dexamethasone sodium phosphate injection, 2 mg  bupivacaine PF (MARCAINE) 0.25 % injection, 20 mL  Med admintered at 8/20/2019 10:25 AM      Medications  Preservative Free Saline:5ml    Post Assessment  Injection Assessment: negative aspiration for heme, incremental injection and no paresthesia on injection  Patient Tolerance:comfortable throughout block  Complications:no  Additional Notes  Procedure:             The pt was placed in the Supine position.  The Insertion site was  prepped and Draped in sterile fashion.  The pt was anesthetized with  IV Sedation( see meds).  Skin and cutaneous tissue was infiltrated and anesthetized with 1% Lidocaine 3 mls via a 25g needle.  A BBraun 4 inch 18g echogenic needle was then  inserted approximately midline, mid-thigh and advanced In-plane with Ultrasound guidance.  Normal Saline PSF  was utilized for hydrodissection of tissue.  The Vastus medialis and Sartorius muscle where visualized and the needle tip was placed in the adductor canal,  lateral to the femoral artery.  LA injection spread was visualized, injection was incremental 1-5ml, injection pressure was normal or little, no intraneural injection, no vascular injection.  LA dose was injected thru the needle(see dose above).

## 2019-08-20 NOTE — ANESTHESIA POSTPROCEDURE EVALUATION
Patient: Jewels Venegas    Procedure Summary     Date:  08/20/19 [facility]    Anesthesia Start:  1017 Anesthesia Stop:  1246    Procedure:  TOTAL KNEE ARTHROPLASTY RIGHT (Right Knee) Diagnosis:       Primary osteoarthritis of right knee      (Primary osteoarthritis of right knee [M17.11])    Surgeon:  Faustino Weinstein MD Provider:  Vince Carmona MD    Anesthesia Type:  general with block ASA Status:  3          Anesthesia Type: general with block  Last vitals  BP   164/74 (08/20/19 1235)   Temp   99.9 °F (37.7 °C) (08/20/19 1235)   Pulse   64 (08/20/19 1235)   Resp   16 (08/20/19 1235)     SpO2   97 % (08/20/19 1235)     Post Anesthesia Care and Evaluation    Patient location during evaluation: PACU  Patient participation: complete - patient participated  Level of consciousness: awake and alert  Pain score: 0  Pain management: adequate  Airway patency: patent  Anesthetic complications: No anesthetic complications  PONV Status: none  Cardiovascular status: hemodynamically stable and acceptable  Respiratory status: nonlabored ventilation, acceptable and nasal cannula  Hydration status: acceptable

## 2019-08-20 NOTE — PLAN OF CARE
Problem: Patient Care Overview  Goal: Plan of Care Review  Outcome: Ongoing (interventions implemented as appropriate)   08/20/19 0937   Coping/Psychosocial   Plan of Care Reviewed With patient;daughter;sibling   Plan of Care Review   Progress improving   OTHER   Outcome Summary Patient ambulated 40 feet with RW and step to gait pattern, limited by pain. ROM to be initiated POD#1. Plan is d/c to [facility]. Will continue to progress as able.        Problem: Knee Arthroplasty (Total, Partial) (Adult)  Goal: Signs and Symptoms of Listed Potential Problems Will be Absent, Minimized or Managed (Knee Arthroplasty)  Outcome: Ongoing (interventions implemented as appropriate)   08/20/19 3435   Goal/Outcome Evaluation   Problems Assessed (Knee Arthroplasty) functional deficit;pain;range of motion decreased   Problems Present (Knee Arthroplasty) functional deficit;pain;range of motion decreased

## 2019-08-20 NOTE — PLAN OF CARE
Problem: Patient Care Overview  Goal: Plan of Care Review  Outcome: Ongoing (interventions implemented as appropriate)   08/20/19 1618   Coping/Psychosocial   Plan of Care Reviewed With patient;family   Plan of Care Review   Progress improving   OTHER   Outcome Summary pt arrived on the floor this afternoon. Patient reports severe pain upon arrival at a 9/10. After turning nerve cath up for 2 hours and giving a PRN norco, she was able to sleep. Intermittent nausea noted. VSS. Tolerating PO well.        Problem: Pain, Chronic (Adult)  Goal: Identify Related Risk Factors and Signs and Symptoms  Outcome: Ongoing (interventions implemented as appropriate)   08/20/19 1618   Pain, Chronic (Adult)   Related Risk Factors (Chronic Pain) surgery   Signs and Symptoms (Chronic Pain) sleep pattern change;fatigue/weakness     Goal: Acceptable Pain/Comfort Level and Functional Ability  Outcome: Ongoing (interventions implemented as appropriate)   08/20/19 1618   Pain, Chronic (Adult)   Acceptable Pain/Comfort Level and Functional Ability making progress toward outcome       Problem: Fall Risk (Adult)  Goal: Identify Related Risk Factors and Signs and Symptoms  Outcome: Ongoing (interventions implemented as appropriate)   08/20/19 1618   Fall Risk (Adult)   Related Risk Factors (Fall Risk) bladder function altered;fatigue/slow reaction;gait/mobility problems;impaired vision;polypharmacy;sleep pattern alteration;environment unfamiliar   Signs and Symptoms (Fall Risk) presence of risk factors     Goal: Absence of Fall  Outcome: Ongoing (interventions implemented as appropriate)   08/20/19 1618   Fall Risk (Adult)   Absence of Fall making progress toward outcome       Problem: Knee Arthroplasty (Total, Partial) (Adult)  Goal: Signs and Symptoms of Listed Potential Problems Will be Absent, Minimized or Managed (Knee Arthroplasty)  Outcome: Ongoing (interventions implemented as appropriate)   08/20/19 1618   Goal/Outcome Evaluation    Problems Assessed (Knee Arthroplasty) all   Problems Present (Knee Arthroplasty) pain;bowel motility decreased;postoperative urinary retention;postoperative nausea and vomiting     Goal: Anesthesia/Sedation Recovery  Outcome: Outcome(s) achieved Date Met: 08/20/19 08/20/19 3461   Goal/Outcome Evaluation   Anesthesia/Sedation Recovery criteria met for discharge

## 2019-08-20 NOTE — THERAPY EVALUATION
"Patient Name: Jewesl Venegas  : 1940    MRN: [id number]                              Today's Date: 2019       Admit Date: 2019    Visit Dx:     ICD-10-CM ICD-9-CM   1. Impaired functional mobility, balance, gait, and endurance Z74.09 V49.89   2. Primary osteoarthritis of right knee M17.11 715.16     Patient Active Problem List   Diagnosis   • Post laminectomy syndrome   • S/P insertion of spinal cord stimulator   • Osteoporosis   • Lumbar stenosis with neurogenic claudication   • Polyneuropathy   • Spondylarthrosis   • Benign paroxysmal positional vertigo   • Insomnia due to medical condition   • At high risk for falls   • Physical deconditioning   • Encounter for fitting and adjustment of neuropacemaker of spinal cord   • Primary osteoarthritis of both knees   • Primary osteoarthritis of right knee   • Status post total right knee replacement   • HTN (hypertension)   • HLD (hyperlipidemia)   • Hypothyroid     Past Medical History:   Diagnosis Date   • Arthritis    • Back pain    • History of kidney stones    • Hyperlipidemia     triglycerides   • Hypertension    • Osteoporosis    • Seasonal allergies    • Thyroid disease    • TIA (transient ischemic attack)     \"Possibly\"   • Urinary frequency    • Urinary urgency    • Vertigo    • Wears dentures    • Wears glasses      Past Surgical History:   Procedure Laterality Date   • APPENDECTOMY     • CARDIAC CATHETERIZATION     • CHOLECYSTECTOMY     • COLONOSCOPY     • LUMBAR LAMINECTOMY  2015    Dr. Domenico Reid, [facility] L2-5   • SPINAL CORD STIMULATOR IMPLANT N/A 10/1/2018    Procedure: SPINAL CORD STIMULATOR INSERTION PHASE 1 ;  Surgeon: Vince Olivas MD;  Location: [facility];  Service: Neurosurgery   • TONSILLECTOMY       General Information     Row Name 19 1619          PT Evaluation Time/Intention    Document Type  evaluation  -LR     Mode of Treatment  physical therapy;individual therapy  -LR     Row Name " 08/20/19 1619          General Information    Patient Profile Reviewed?  yes  -LR     Prior Level of Function  min assist:;all household mobility;community mobility;gait;transfer;bed mobility;ADL's;home management;cooking;cleaning;using stairs;shopping;independent:;driving limited long distance mobility and prolonged standing  -LR     Existing Precautions/Restrictions  fall;other (see comments) R adductor canal nerve catheter  -LR     Barriers to Rehab  previous functional deficit;medically complex  -LR     Row Name 08/20/19 1619          Relationship/Environment    Lives With  alone  -LR     Row Name 08/20/19 1619 08/20/19 1354       Resource/Environmental Concerns    Current Living Arrangements  home/apartment/condo  -LR  home/apartment/condo  -BS    Resource/Environmental Concerns  --  none  -BS    Transportation Concerns  --  car, none  -BS    Row Name 08/20/19 1619          Home Main Entrance    Number of Stairs, Main Entrance  none ramp access  -LR     Row Name 08/20/19 1619          Cognitive Assessment/Intervention- PT/OT    Orientation Status (Cognition)  oriented x 4  -LR     Row Name 08/20/19 1619          Safety Issues, Functional Mobility    Safety Issues Affecting Function (Mobility)  awareness of need for assistance;insight into deficits/self awareness;positioning of assistive device;safety precaution awareness;steps too close to assistive device;safety precautions follow-through/compliance;sequencing abilities  -LR       User Key  (r) = Recorded By, (t) = Taken By, (c) = Cosigned By    Initials Name Provider Type    LR Danisha Navarro, PT Physical Therapist    Chey Warner RN Registered Nurse        Mobility     Row Name 08/20/19 1619          Bed Mobility Assessment/Treatment    Bed Mobility Assessment/Treatment  supine-sit  -LR     Supine-Sit [place] (Bed Mobility)  verbal cues;contact guard  -LR     Assistive Device (Bed Mobility)  head of bed elevated;bed rails  -LR      Comment (Bed Mobility)  Verbal cues to move LEs towards EOB and to push up from bed to raise trunk into sitting and to scoot hips out to get feet on floor. Required increased time to perform. Denied dizziness upon sitting up. Assisted with donning brief sitting EOB.   -LR     Row Name 08/20/19 1619          Transfer Assessment/Treatment    Comment (Transfers)  Verbal cues to push up from bed to stand and to reach back for chair to lower into sitting. Verbal cues to step R LE out before t/f for comfort. Required increased time to rise into standing.   -LR     Row Name 08/20/19 1619          Sit-Stand Transfer    Sit-Stand [place] (Transfers)  verbal cues;contact guard;2 person assist  -LR     Assistive Device (Sit-Stand Transfers)  walker, front-wheeled  -LR     Row Name 08/20/19 1619          Gait/Stairs Assessment/Training    34646 - Gait Training Minutes   10  -LR     [place] Level (Gait)  verbal cues;contact guard;2 person assist  -LR     Assistive Device (Gait)  walker, front-wheeled  -LR     Distance in Feet (Gait)  40  -LR     Deviations/Abnormal Patterns (Gait)  bilateral deviations;giuseppe decreased;gait speed decreased;stride length decreased;right sided deviations;antalgic  -LR     Bilateral Gait Deviations  heel strike decreased;forward flexed posture  -LR     Right Sided Gait Deviations  weight shift ability decreased  -LR     Comment (Gait/Stairs)  Patient ambulated with step to gait pattern at slow pace. Verbal cues for increased step length, increased R LE weight bearing/stance phase, and increased step length. Improved with cues for correction. Gait limited by pain.   -LR     Row Name 08/20/19 1619          Mobility Assessment/Intervention    Extremity Weight-bearing Status  right lower extremity  -LR     Right Lower Extremity (Weight-bearing Status)  weight-bearing as tolerated (WBAT)  -LR       User Key  (r) = Recorded By, (t) = Taken By, (c) = Cosigned By    Initials Name Provider Type     LR Danisha Navarro, PT Physical Therapist        Obj/Interventions     Row Name 08/20/19 1619          General ROM    GENERAL ROM COMMENTS  L LE AROM WFL; R knee AROM impaired 25%, will formally measure POD#1  -LR     Row Name 08/20/19 1619          MMT (Manual Muscle Testing)    General MMT Comments  L LE functionally 4/5, R LE functionally 4-/5, independent with SLR, no knee buckling with gait.   -LR     Row Name 08/20/19 1619          Therapeutic Exercise    Lower Extremity (Therapeutic Exercise)  gluteal sets;quad sets, right  -LR     Lower Extremity Range of Motion (Therapeutic Exercise)  ankle dorsiflexion/plantar flexion, right  -LR     Exercise Type (Therapeutic Exercise)  AROM (active range of motion);isotonic contraction, concentric;isometric contraction, static  -LR     Position (Therapeutic Exercise)  supine  -LR     Sets/Reps (Therapeutic Exercise)  x10 reps each  -LR     Comment (Therapeutic Exercise)  cues for technique; able to actively DF bilaterally  -LR     Row Name 08/20/19 1619          Sensory Assessment/Intervention    Sensory General Assessment  -- reports baseline numbness in toes and bottoms of feet  -LR       User Key  (r) = Recorded By, (t) = Taken By, (c) = Cosigned By    Initials Name Provider Type    LR Danisha Navarro, PT Physical Therapist        Goals/Plan     Row Name 08/20/19 1619          Bed Mobility Goal 1 (PT)    Activity/Assistive Device (Bed Mobility Goal 1, PT)  sit to supine/supine to sit  -LR     [place] Level/Cues Needed (Bed Mobility Goal 1, PT)  conditional independence  -LR     Time Frame (Bed Mobility Goal 1, PT)  long term goal (LTG);3 days  -LR     Progress/Outcomes (Bed Mobility Goal 1, PT)  goal ongoing  -LR     Row Name 08/20/19 1619          Transfer Goal 1 (PT)    Activity/Assistive Device (Transfer Goal 1, PT)  sit-to-stand/stand-to-sit;walker, rolling  -LR     [place] Level/Cues Needed (Transfer Goal 1, PT)  conditional  independence  -LR     Time Frame (Transfer Goal 1, PT)  long term goal (LTG);3 days  -LR     Progress/Outcome (Transfer Goal 1, PT)  goal ongoing  -LR     Row Name 08/20/19 1619          Gait Training Goal 1 (PT)    Activity/Assistive Device (Gait Training Goal 1, PT)  gait (walking locomotion);walker, rolling  -LR     [place] Level (Gait Training Goal 1, PT)  conditional independence  -LR     Distance (Gait Goal 1, PT)  150 feet  -LR     Time Frame (Gait Training Goal 1, PT)  long term goal (LTG);3 days  -LR     Progress/Outcome (Gait Training Goal 1, PT)  goal ongoing  -LR     Row Name 08/20/19 1619          ROM Goal 1 (PT)    ROM Goal 1 (PT)  0-90 degrees AAROM R knee  -LR     Time Frame (ROM Goal 1, PT)  long term goal (LTG);3 days  -LR     Progress/Outcome (ROM Goal 1, PT)  goal ongoing  -LR       User Key  (r) = Recorded By, (t) = Taken By, (c) = Cosigned By    Initials Name Provider Type    LR Danisha Navarro, PT Physical Therapist        Clinical Impression     Row Name 08/20/19 1619          Pain Assessment    Additional Documentation  Pain Scale: Numbers Pre/Post-Treatment (Group)  -LR     Row Name 08/20/19 1619          Pain Scale: Numbers Pre/Post-Treatment    Pain Scale: Numbers, Pretreatment  4/10  -LR     Pain Scale: Numbers, Post-Treatment  6/10  -LR     Pain Location - Side  Right  -LR     Pain Location - Orientation  generalized  -LR     Pain Location  knee  -LR     Pain Intervention(s)  Cold applied;Ambulation/increased activity;Repositioned  -LR     Row Name 08/20/19 1619 08/20/19 1618       Plan of Care Review    Plan of Care Reviewed With  patient;daughter;sibling  -LR  patient;family  -BS    Row Name 08/20/19 0833          Plan of Care Review    Plan of Care Reviewed With  patient  -MB     Row Name 08/20/19 1619          Physical Therapy Clinical Impression    Patient/Family Goals Statement (PT Clinical Impression)  decrease pain  -LR     Criteria for Skilled Interventions Met (PT  Clinical Impression)  yes  -LR     Rehab Potential (PT Clinical Summary)  good, to achieve stated therapy goals  -LR     Row Name 08/20/19 1619          Positioning and Restraints    Pre-Treatment Position  in bed  -LR     Post Treatment Position  chair  -LR     In Chair  notified nsg;reclined;sitting;call light within reach;encouraged to call for assist;exit alarm on;with family/caregiver;compression device;legs elevated  -LR       User Key  (r) = Recorded By, (t) = Taken By, (c) = Cosigned By    Initials Name Provider Type    LR Danisha Navarro, PT Physical Therapist    Francoise Ortiz, RN Registered Nurse    Chey Warner RN Registered Nurse        Outcome Measures     Row Name 08/20/19 1619          How much help from another person do you currently need...    Turning from your back to your side while in flat bed without using bedrails?  3  -LR     Moving from lying on back to sitting on the side of a flat bed without bedrails?  3  -LR     Moving to and from a bed to a chair (including a wheelchair)?  3  -LR     Standing up from a chair using your arms (e.g., wheelchair, bedside chair)?  3  -LR     Climbing 3-5 steps with a railing?  1  -LR     To walk in hospital room?  3  -LR     AM-PAC 6 Clicks Score (PT)  16  -LR     Row Name 08/20/19 1619          Functional Assessment    Outcome Measure Options  AM-PAC 6 Clicks Basic Mobility (PT)  -LR       User Key  (r) = Recorded By, (t) = Taken By, (c) = Cosigned By    Initials Name Provider Type    Danisha Pierre, PT Physical Therapist        Physical Therapy Education     Title: PT OT SLP Therapies (Done)     Topic: Physical Therapy (Done)     Point: Mobility training (Done)     Learning Progress Summary           Patient Acceptance, E,D, VU,NR by LR at 8/20/2019  4:19 PM    Comment:  Educated on precautions, weight bearing status, correct supine to sit t/f technique, correct sit<->stand t/f technique, correct gait mechanics, and  progression of POC.   Family Acceptance, E,D, VU,NR by LR at 8/20/2019  4:19 PM    Comment:  Educated on precautions, weight bearing status, correct supine to sit t/f technique, correct sit<->stand t/f technique, correct gait mechanics, and progression of POC.                   Point: Home exercise program (Done)     Learning Progress Summary           Patient Acceptance, E,D, VU,NR by LR at 8/20/2019  4:19 PM    Comment:  Educated on precautions, weight bearing status, correct supine to sit t/f technique, correct sit<->stand t/f technique, correct gait mechanics, and progression of POC.   Family Acceptance, E,D, VU,NR by LR at 8/20/2019  4:19 PM    Comment:  Educated on precautions, weight bearing status, correct supine to sit t/f technique, correct sit<->stand t/f technique, correct gait mechanics, and progression of POC.                   Point: Body mechanics (Done)     Learning Progress Summary           Patient Acceptance, E,D, VU,NR by LR at 8/20/2019  4:19 PM    Comment:  Educated on precautions, weight bearing status, correct supine to sit t/f technique, correct sit<->stand t/f technique, correct gait mechanics, and progression of POC.   Family Acceptance, E,D, VU,NR by LR at 8/20/2019  4:19 PM    Comment:  Educated on precautions, weight bearing status, correct supine to sit t/f technique, correct sit<->stand t/f technique, correct gait mechanics, and progression of POC.                   Point: Precautions (Done)     Learning Progress Summary           Patient Acceptance, E,D, VU,NR by LR at 8/20/2019  4:19 PM    Comment:  Educated on precautions, weight bearing status, correct supine to sit t/f technique, correct sit<->stand t/f technique, correct gait mechanics, and progression of POC.   Family Acceptance, E,D, VU,NR by LR at 8/20/2019  4:19 PM    Comment:  Educated on precautions, weight bearing status, correct supine to sit t/f technique, correct sit<->stand t/f technique, correct gait mechanics, and  progression of POC.                               User Key     Initials Effective Dates Name Provider Type Discipline    LR 06/19/15 -  Danisha Navarro, PT Physical Therapist PT              PT Recommendation and Plan  Planned Therapy Interventions (PT Eval): balance training, bed mobility training, gait training, home exercise program, patient/family education, ROM (range of motion), stair training, transfer training, strengthening  Outcome Summary/Treatment Plan (PT)  Anticipated Equipment Needs at Discharge (PT): (TBD)  Anticipated Discharge Disposition (PT): inpatient rehabilitation facility  Plan of Care Reviewed With: patient, daughter, sibling  Progress: improving  Outcome Summary: Patient ambulated 40 feet with RW and step to gait pattern, limited by pain. ROM to be initiated POD#1. Plan is d/c to [facility]. Will continue to progress as able.      Time Calculation:   PT Charges     Row Name 08/20/19 1619             Time Calculation    Start Time  1619  -LR      PT Received On  08/20/19  -LR      PT Goal Re-Cert Due Date  08/30/19  -LR         Time Calculation- PT    Total Timed Code Minutes- PT  12 minute(s)  -LR         Timed Charges    39544 - PT Therapeutic Exercise Minutes  2  -LR      16377 - Gait Training Minutes   10  -LR        User Key  (r) = Recorded By, (t) = Taken By, (c) = Cosigned By    Initials Name Provider Type    LR Danisha Navarro, PT Physical Therapist        Therapy Charges for Today     Code Description Service Date Service Provider Modifiers Qty    14863420759 HC GAIT TRAINING EA 15 MIN 8/20/2019 Danisha Navarro, PT GP 1    24593508998 HC PT THER SUPP EA 15 MIN 8/20/2019 Danisha Navarro, PT GP 4    22576473086 HC PT EVAL MOD COMPLEXITY 4 8/20/2019 Danisha Navarro, PT GP 1          PT G-Codes  Outcome Measure Options: AM-PAC 6 Clicks Basic Mobility (PT)  AM-PAC 6 Clicks Score (PT): 16    Danisha Navarro, PT  8/20/2019

## 2019-08-20 NOTE — H&P
Patient Name: Jewels Venegas  MRN: [id number]  : 1940  DOS: 2019    Attending: Faustino Weinstein MD    Primary Care Provider: Renée Multani MD      Chief complaint:  Right knee pain    Subjective   Patient is a 79 y.o. female presented for right total knee arthroplasty by Dr. Weinstein.    She underwent surgery under GA. She tolerated surgery well and is admitted for further medical management. Her knee has been painful for many years. She uses a walker for ambulation. She denies recent falls.    When seen postop she is drowsy. She complains of nausea and pain. She denies shortness of breath or chest pain. No hx of DVT or PE.     (Above is noted, agree.  Seen in her room afterwards where she is doing fairly well.  Already ambulated with physical therapy 40 feet with rolling walker and step to gait pattern.  No nausea vomiting or shortness of breath.  Some expected postoperative pain.)wy     Allergies:  Allergies   Allergen Reactions   • Betadine [Povidone Iodine] Rash     Itching, and blisters   • Adhesive Tape Rash     Plastic & cloth, can tolerate paper tape   • Codeine Itching   • Influenza Vaccines Other (See Comments) and Myalgia     fever       Meds:  Medications Prior to Admission   Medication Sig Dispense Refill Last Dose   • acetaminophen (TYLENOL) 500 MG tablet Take 500 mg by mouth Every 6 (Six) Hours As Needed for Mild Pain .   2019 at 2000   • amLODIPine (NORVASC) 5 MG tablet Take 5 mg by mouth Daily.   2019 at Unknown time   • atorvastatin (LIPITOR) 20 MG tablet Take 10 mg by mouth 2 (Two) Times a Week. MON AND THURS   2019 at Unknown time   • Chlorhexidine Gluconate 4 % solution  Shower with hibiclens solution as directed for 5 days prior to surgery 237 mL 0 2019 at Unknown time   • levocetirizine (XYZAL) 5 MG tablet Take 5 mg by mouth Every Evening.   2019 at Unknown time   • levothyroxine (SYNTHROID, LEVOTHROID) 112 MCG tablet Take 112 mcg by mouth Daily.    "8/20/2019 at Unknown time   • methocarbamol (ROBAXIN) 750 MG tablet Take 1 tablet by mouth 2 (Two) Times a Day. 60 tablet 5 8/19/2019 at Unknown time   • mupirocin (BACTROBAN) 2 % ointment Apply into the nostril(s) as directed by provider 2 (Two) Times a Day. 22 g 0 8/19/2019 at Unknown time   • quinapril-hydrochlorothiazide (ACCURETIC) 20-25 MG per tablet Take 1 tablet by mouth Daily.   8/19/2019 at Unknown time   • aspirin 81 MG EC tablet Take 81 mg by mouth Daily. Stopped in mid Aug 2018   More than a month at Unknown time   • Cyanocobalamin (B-12-SL SL) Place 1 tablet under the tongue Daily.   8/13/2019   • Dermatological Products, Misc. (EPICERAM) lotion Apply twice daily to back (Patient taking differently: Apply 1 application topically to the appropriate area as directed 2 (Two) Times a Day With Meals. Apply twice daily to back) 225 g 0 10/20/2018   • fluticasone (FLONASE) 50 MCG/ACT nasal spray 2 sprays into each nostril As Needed for Rhinitis.   8/13/2019   • Krill Oil 500 MG capsule Take 1 tablet by mouth Daily.   8/13/2019   • Multiple Vitamins-Minerals (MULTIVITAMIN WITH MINERALS) tablet tablet Take 1 tablet by mouth Daily.   8/13/2019   • Omega-3 1000 MG capsule Take 1 tablet by mouth Daily.   8/13/2019   • pyridoxine (VITAMIN B-6) 25 MG tablet Take 1 tablet by mouth 3 (Three) Times a Day. 90 tablet 5 8/13/2019   • traMADol (ULTRAM) 50 MG tablet Take 1 tablet by mouth Every 6 (Six) Hours As Needed for Moderate Pain . 60 tablet  8/6/2019   • TURMERIC CURCUMIN PO Take  by mouth Daily.   8/13/2019   • VITAMIN D, ERGOCALCIFEROL, PO Take 5,000 Units by mouth Daily.   8/13/2019       History:   Past Medical History:   Diagnosis Date   • Arthritis    • Back pain    • History of kidney stones    • Hyperlipidemia     triglycerides   • Hypertension    • Osteoporosis    • Seasonal allergies    • Thyroid disease    • TIA (transient ischemic attack)     \"Possibly\"   • Urinary frequency    • Urinary urgency    • " Vertigo    • Wears dentures    • Wears glasses      Past Surgical History:   Procedure Laterality Date   • APPENDECTOMY     • CARDIAC CATHETERIZATION     • CHOLECYSTECTOMY     • COLONOSCOPY     • LUMBAR LAMINECTOMY  2015    Dr. Domenico Reid, [facility] L2-5   • SPINAL CORD STIMULATOR IMPLANT N/A 10/1/2018    Procedure: SPINAL CORD STIMULATOR INSERTION PHASE 1 ;  Surgeon: Vince Olivas MD;  Location: [facility];  Service: Neurosurgery   • TONSILLECTOMY       Family History   Problem Relation Age of Onset   • Cancer Father    • Heart disease Father    • Breast cancer Neg Hx      Social History     Tobacco Use   • Smoking status: Former Smoker     Years: 20.00     Types: Cigarettes     Last attempt to quit: 1980     Years since quittin.6   • Smokeless tobacco: Never Used   • Tobacco comment: Smoking 4 (!!) PPD when patient quit    Substance Use Topics   • Alcohol use: No   • Drug use: No   She lives alone and has 2 children. She is a retired nurse.    Review of Systems  Pertinent items are noted in HPI, all other systems reviewed and negative    Vital Signs  Visit Vitals  /61   Pulse 62   Temp 97.4 °F (36.3 °C)   Resp 16   SpO2 98%   Breastfeeding? No       Physical Exam:    General Appearance:    Alert, cooperative, in no acute distress   Head:    Normocephalic, without obvious abnormality, atraumatic   Eyes:            Lids and lashes normal, conjunctivae and sclerae normal, no   icterus, no pallor, corneas clear   Ears:    Ears appear intact with no abnormalities noted   Neck:   No adenopathy, supple, trachea midline, no thyromegaly   Lungs:     Clear to auscultation,respirations regular, even and                   unlabored    Heart:    Regular rhythm and normal rate, normal S1 and S2, no            murmur, no gallop   Abdomen:     Normal bowel sounds, no masses, no organomegaly, soft        non-tender, non-distended, no guarding, no rebound                 tenderness    Genitalia:    Deferred   Extremities:   Right knee ACE wrap CDI. Nerve block present   Pulses:   Pulses palpable and equal bilaterally   Skin:   No bleeding, bruising or rash   Neurologic:   Cranial nerves 2 - 12 grossly intact, sensation intact. Flexion and dorsiflexion intact bilateral feet.        I reviewed the patient's new clinical results.     Results from last 7 days   Lab Units 08/20/19  0814   POTASSIUM mmol/L 3.7     Results for MADHAVI ROBLERO (MRN [id number]) as of 8/20/2019 14:06   Ref. Range 8/6/2019 15:12   Glucose Latest Ref Range: 65 - 99 mg/dL 103 (H)   Sodium Latest Ref Range: 136 - 145 mmol/L 141   Potassium Latest Ref Range: 3.5 - 5.2 mmol/L 3.8   CO2 Latest Ref Range: 22.0 - 29.0 mmol/L 26.0   Chloride Latest Ref Range: 98 - 107 mmol/L 99   Anion Gap Latest Ref Range: 5.0 - 15.0 mmol/L 16.0 (H)   Creatinine Latest Ref Range: 0.57 - 1.00 mg/dL 0.63   BUN Latest Ref Range: 8 - 23 mg/dL 17   BUN/Creatinine Ratio Latest Ref Range: 7.0 - 25.0  27.0 (H)   Calcium Latest Ref Range: 8.6 - 10.5 mg/dL 10.4   eGFR Non  Am Latest Ref Range: >60 mL/min/1.73 92   Hemoglobin A1C Latest Ref Range: 4.80 - 5.60 % 5.50   C-Reactive Protein Latest Ref Range: 0.00 - 0.50 mg/dL 0.24   Protime Latest Ref Range: 11.2 - 14.3 Seconds 12.4   INR Latest Ref Range: 0.85 - 1.16  0.97   PTT Latest Ref Range: 24.0 - 37.0 seconds 29.5   WBC Latest Ref Range: 3.40 - 10.80 10*3/mm3 5.89   RBC Latest Ref Range: 3.77 - 5.28 10*6/mm3 4.41   Hemoglobin Latest Ref Range: 12.0 - 15.9 g/dL 13.5   Hematocrit Latest Ref Range: 34.0 - 46.6 % 41.7   RDW Latest Ref Range: 12.3 - 15.4 % 13.3   MCV Latest Ref Range: 79.0 - 97.0 fL 94.6   MCH Latest Ref Range: 26.6 - 33.0 pg 30.6   MCHC Latest Ref Range: 31.5 - 35.7 g/dL 32.4   MPV Latest Ref Range: 6.0 - 12.0 fL 8.8   Platelets Latest Ref Range: 140 - 450 10*3/mm3 295     Assessment and Plan:     Status post total right knee replacement    Primary osteoarthritis of right knee     HTN (hypertension)    HLD (hyperlipidemia)    Hypothyroid      Plan  1. PT/OT- early ambulation post op  2. Pain control-prns, AC nerve block   3. IS-encourage  4. DVT proph- mechs/ASA  5. Bowel regimen  6. Resume home medications as appropriate  7. Monitor post-op labs  8. Discharge planning for [facility]. CM consulted     HTN, HLD  - Continue home norvasc, lisinopril and statin  - Hold HCTZ  - Monitor BP   - Holding parameters for BP meds  - Labetalol PRN for SBP>170    Hypothyroid  - Continue home synthroid       AMITA Parekh  08/20/19  2:02 PM   I have personally performed the evaluation on this patient. My history is consistent  with HPI obtained. My exam findings are listed above. I have personally reviewed and discussed the above formulated treatment plan with AMITA.

## 2019-08-20 NOTE — H&P
Patient Name: Jewels Venegas  MRN: [id number]  : 1940  DOS: 2019    Attending: Faustino Weinstein MD    Primary Care Provider: Renée Multani MD      Chief complaint:  Right knee pain    Subjective   Patient is a 79 y.o. female presented for right total knee arthroplasty by Dr. Weinstein.    She underwent surgery under GA. She tolerated surgery well and is admitted for further medical management. Her knee has been painful for many years. She uses a walker for ambulation. She denies recent falls.    When seen postop she is drowsy. She complains of nausea and pain. She denies shortness of breath or chest pain. No hx of DVT or PE.    (Above is noted, agree.  Seen in her room afterwards where she is doing fairly well.  Already ambulated with physical therapy 40 feet with rolling walker and step to gait pattern.  No nausea vomiting or shortness of breath.  Some expected postoperative pain.)wy       Allergies:  Allergies   Allergen Reactions   • Betadine [Povidone Iodine] Rash     Itching, and blisters   • Adhesive Tape Rash     Plastic & cloth, can tolerate paper tape   • Codeine Itching   • Influenza Vaccines Other (See Comments) and Myalgia     fever       Meds:  Medications Prior to Admission   Medication Sig Dispense Refill Last Dose   • acetaminophen (TYLENOL) 500 MG tablet Take 500 mg by mouth Every 6 (Six) Hours As Needed for Mild Pain .   2019 at 2000   • amLODIPine (NORVASC) 5 MG tablet Take 5 mg by mouth Daily.   2019 at Unknown time   • atorvastatin (LIPITOR) 20 MG tablet Take 10 mg by mouth 2 (Two) Times a Week. MON AND THURS   2019 at Unknown time   • levocetirizine (XYZAL) 5 MG tablet Take 5 mg by mouth Every Evening.   2019 at Unknown time   • levothyroxine (SYNTHROID, LEVOTHROID) 112 MCG tablet Take 112 mcg by mouth Daily.   2019 at Unknown time   • methocarbamol (ROBAXIN) 750 MG tablet Take 1 tablet by mouth 2 (Two) Times a Day. 60 tablet 5 2019 at Unknown time  "  • quinapril-hydrochlorothiazide (ACCURETIC) 20-25 MG per tablet Take 1 tablet by mouth Daily.   8/19/2019 at Unknown time   • aspirin 81 MG EC tablet Take 81 mg by mouth Daily. Stopped in mid Aug 2018   More than a month at Unknown time   • Cyanocobalamin (B-12-SL SL) Place 1 tablet under the tongue Daily.   8/13/2019   • Dermatological Products, Misc. (EPICERAM) lotion Apply twice daily to back (Patient taking differently: Apply 1 application topically to the appropriate area as directed 2 (Two) Times a Day With Meals. Apply twice daily to back) 225 g 0 10/20/2018   • fluticasone (FLONASE) 50 MCG/ACT nasal spray 2 sprays into each nostril As Needed for Rhinitis.   8/13/2019   • Krill Oil 500 MG capsule Take 1 tablet by mouth Daily.   8/13/2019   • Multiple Vitamins-Minerals (MULTIVITAMIN WITH MINERALS) tablet tablet Take 1 tablet by mouth Daily.   8/13/2019   • Omega-3 1000 MG capsule Take 1 tablet by mouth Daily.   8/13/2019   • pyridoxine (VITAMIN B-6) 25 MG tablet Take 1 tablet by mouth 3 (Three) Times a Day. 90 tablet 5 8/13/2019   • traMADol (ULTRAM) 50 MG tablet Take 1 tablet by mouth Every 6 (Six) Hours As Needed for Moderate Pain . 60 tablet  8/6/2019   • TURMERIC CURCUMIN PO Take  by mouth Daily.   8/13/2019   • VITAMIN D, ERGOCALCIFEROL, PO Take 5,000 Units by mouth Daily.   8/13/2019       History:   Past Medical History:   Diagnosis Date   • Arthritis    • Back pain    • History of kidney stones    • Hyperlipidemia     triglycerides   • Hypertension    • Osteoporosis    • Seasonal allergies    • Thyroid disease    • TIA (transient ischemic attack)     \"Possibly\"   • Urinary frequency    • Urinary urgency    • Vertigo    • Wears dentures    • Wears glasses      Past Surgical History:   Procedure Laterality Date   • APPENDECTOMY  1977   • CARDIAC CATHETERIZATION     • CHOLECYSTECTOMY     • COLONOSCOPY     • LUMBAR LAMINECTOMY  06/2015    Dr. Domenico Reid, [facility] L2-5   • SPINAL CORD " "STIMULATOR IMPLANT N/A 10/1/2018    Procedure: SPINAL CORD STIMULATOR INSERTION PHASE 1 ;  Surgeon: Vince Olivas MD;  Location: [facility];  Service: Neurosurgery   • TONSILLECTOMY       Family History   Problem Relation Age of Onset   • Cancer Father    • Heart disease Father    • Breast cancer Neg Hx      Social History     Tobacco Use   • Smoking status: Former Smoker     Years: 20.00     Types: Cigarettes     Last attempt to quit: 1980     Years since quittin.6   • Smokeless tobacco: Never Used   • Tobacco comment: Smoking 4 (!!) PPD when patient quit    Substance Use Topics   • Alcohol use: No   • Drug use: No   She lives alone and has 2 children. She is a retired nurse.    Review of Systems  Pertinent items are noted in HPI, all other systems reviewed and negative    Vital Signs  Visit Vitals  /60   Pulse 73   Temp 97.3 °F (36.3 °C) (Temporal)   Resp 16   Ht 168.9 cm (66.5\")   Wt 88.5 kg (194 lb 16 oz)   SpO2 98%   Breastfeeding? No   BMI 31.01 kg/m²       Physical Exam:    General Appearance:    Alert, cooperative, in no acute distress   Head:    Normocephalic, without obvious abnormality, atraumatic   Eyes:            Lids and lashes normal, conjunctivae and sclerae normal, no   icterus, no pallor, corneas clear   Ears:    Ears appear intact with no abnormalities noted   Neck:   No adenopathy, supple, trachea midline, no thyromegaly   Lungs:     Clear to auscultation,respirations regular, even and                   unlabored    Heart:    Regular rhythm and normal rate, normal S1 and S2, no            murmur, no gallop   Abdomen:     Normal bowel sounds, no masses, no organomegaly, soft        non-tender, non-distended, no guarding, no rebound                 tenderness   Genitalia:    Deferred   Extremities:   Right knee ACE wrap CDI. Nerve block present   Pulses:   Pulses palpable and equal bilaterally   Skin:   No bleeding, bruising or rash   Neurologic:   Cranial nerves 2 - 12 grossly " intact, sensation intact. Flexion and dorsiflexion intact bilateral feet.        I reviewed the patient's new clinical results.     Results from last 7 days   Lab Units 08/20/19  0814   POTASSIUM mmol/L 3.7     Results for MADHAVI ROBLERO (MRN [id number]) as of 8/20/2019 14:06   Ref. Range 8/6/2019 15:12   Glucose Latest Ref Range: 65 - 99 mg/dL 103 (H)   Sodium Latest Ref Range: 136 - 145 mmol/L 141   Potassium Latest Ref Range: 3.5 - 5.2 mmol/L 3.8   CO2 Latest Ref Range: 22.0 - 29.0 mmol/L 26.0   Chloride Latest Ref Range: 98 - 107 mmol/L 99   Anion Gap Latest Ref Range: 5.0 - 15.0 mmol/L 16.0 (H)   Creatinine Latest Ref Range: 0.57 - 1.00 mg/dL 0.63   BUN Latest Ref Range: 8 - 23 mg/dL 17   BUN/Creatinine Ratio Latest Ref Range: 7.0 - 25.0  27.0 (H)   Calcium Latest Ref Range: 8.6 - 10.5 mg/dL 10.4   eGFR Non  Am Latest Ref Range: >60 mL/min/1.73 92   Hemoglobin A1C Latest Ref Range: 4.80 - 5.60 % 5.50   C-Reactive Protein Latest Ref Range: 0.00 - 0.50 mg/dL 0.24   Protime Latest Ref Range: 11.2 - 14.3 Seconds 12.4   INR Latest Ref Range: 0.85 - 1.16  0.97   PTT Latest Ref Range: 24.0 - 37.0 seconds 29.5   WBC Latest Ref Range: 3.40 - 10.80 10*3/mm3 5.89   RBC Latest Ref Range: 3.77 - 5.28 10*6/mm3 4.41   Hemoglobin Latest Ref Range: 12.0 - 15.9 g/dL 13.5   Hematocrit Latest Ref Range: 34.0 - 46.6 % 41.7   RDW Latest Ref Range: 12.3 - 15.4 % 13.3   MCV Latest Ref Range: 79.0 - 97.0 fL 94.6   MCH Latest Ref Range: 26.6 - 33.0 pg 30.6   MCHC Latest Ref Range: 31.5 - 35.7 g/dL 32.4   MPV Latest Ref Range: 6.0 - 12.0 fL 8.8   Platelets Latest Ref Range: 140 - 450 10*3/mm3 295     Assessment and Plan:     Status post total right knee replacement    Primary osteoarthritis of right knee    HTN (hypertension)    HLD (hyperlipidemia)    Hypothyroid      Plan  1. PT/OT- early ambulation post op  2. Pain control-prns, AC nerve block   3. IS-encourage  4. DVT proph- mechs/ASA  5. Bowel regimen  6. Resume home  medications as appropriate  7. Monitor post-op labs  8. Discharge planning for [facility]. CM consulted     HTN, HLD  - Continue home norvasc, lisinopril and statin  - Hold HCTZ  - Monitor BP   - Holding parameters for BP meds  - Labetalol PRN for SBP>170    Hypothyroid  - Continue home synthroid       Sandie Veliz MD  08/20/19  6:26 PM     I have personally performed the evaluation on this patient. My history is consistent  with HPI obtained. My exam findings are listed above. I have personally reviewed and discussed the above formulated treatment plan with pt and AH.Vel.

## 2019-08-20 NOTE — ANESTHESIA PROCEDURE NOTES
Peripheral Block      Patient reassessed immediately prior to procedure    Patient location during procedure: post-op  Start time: 8/20/2019 12:37 PM  Stop time: 8/20/2019 12:44 PM  Reason for block: at surgeon's request and post-op pain management  Performed by  CRNA: Bradley Harrington, CRNA  Assisted by: Kellie Antonio RN  Preanesthetic Checklist  Completed: patient identified, site marked, surgical consent, pre-op evaluation, timeout performed, IV checked, risks and benefits discussed and monitors and equipment checked  Prep:  Pt Position: supine  Sterile barriers:cap, gloves, mask and sterile barriers  Prep: ChloraPrep  Patient monitoring: blood pressure monitoring, continuous pulse oximetry and EKG  Procedure  Sedation:no  Performed under: local infiltration  Guidance:ultrasound guided  Images:still images obtained, printed/placed on chart    Laterality:right  Block Type:adductor canal block  Injection Technique:catheter  Needle Type:Tuohy and echogenic  Needle Gauge:18 G  Resistance on Injection: none  Catheter Size:20 G (20g)  Cath Depth at skin: 14 cm    Medications Used: bupivacaine PF (MARCAINE) 0.25 % injection, 10 mL  Med admintered at 8/20/2019 12:38 PM      Medications  Preservative Free Saline:5ml    Post Assessment  Injection Assessment: negative aspiration for heme, incremental injection and no paresthesia on injection  Patient Tolerance:comfortable throughout block  Complications:no  Additional Notes  Procedure:             The pt was placed in the Supine position.  The Insertion site was  prepped and Draped in sterile fashion.  The pt was anesthetized with  IV Sedation( see meds).  Skin and cutaneous tissue was infiltrated and anesthetized with 1% Lidocaine 3 mls via a 25g needle.  A BBraun 4 inch 18g echogenic needle was then  inserted approximately midline, mid-thigh and advanced In-plane with Ultrasound guidance.  Normal Saline PSF was utilized for hydrodissection of tissue.  The Vastus  medialis and Sartorius muscle where visualized and the needle tip was placed in the adductor canal,  lateral to the femoral artery.  LA injection spread was visualized, injection was incremental 1-5ml, injection pressure was normal or little, no intraneural injection, no vascular injection.  LA dose was injected thru the needle(see dose above).  A BBraun 20g wire stylet catheter was placed via the needle with ultrasound visualization and confirmation with NS fluid bolus. The labeled Catheter was then secured to skin at insertion site with skin afix and steristrips to curled catheter and CHG transparent dressing.  Thank you.

## 2019-08-20 NOTE — ANESTHESIA PROCEDURE NOTES
Airway  Urgency: elective    Airway not difficult    General Information and Staff    Patient location during procedure: OR  CRNA: Bradley Harrington CRNA    Indications and Patient Condition  Indications for airway management: airway protection    Preoxygenated: yes  MILS not maintained throughout  Mask difficulty assessment: 1 - vent by mask    Final Airway Details  Final airway type: endotracheal airway      Successful airway: ETT  Cuffed: yes   Successful intubation technique: direct laryngoscopy  Endotracheal tube insertion site: oral  Blade: Cassy  Blade size: 3  ETT size (mm): 7.0  Cormack-Lehane Classification: grade I - full view of glottis  Placement verified by: chest auscultation and capnometry   Cuff volume (mL): 8  Measured from: lips  ETT to lips (cm): 20  Number of attempts at approach: 1    Additional Comments  Negative epigastric sounds, Breath sound equal bilaterally with symmetric chest rise and fall. Atraumatic, no damage to lips or teeth during intubation

## 2019-08-20 NOTE — ANESTHESIA PREPROCEDURE EVALUATION
Anesthesia Evaluation     NPO Solid Status: > 8 hours  NPO Liquid Status: > 8 hours           Airway   Mallampati: II  TM distance: >3 FB  Neck ROM: full  Possible difficult intubation  Dental    (+) upper dentures        Pulmonary    (+) a smoker Former, decreased breath sounds,   (-) asthma  Cardiovascular     ECG reviewed  Rhythm: regular  Rate: normal    (+) hypertension, valvular problems/murmurs AI, murmur,   (-) pacemaker, angina, cardiac stents, CABG      Neuro/Psych  (-) seizures, CVA  GI/Hepatic/Renal/Endo    (-) liver disease, no renal disease, diabetes    Musculoskeletal     Abdominal    Substance History      OB/GYN          Other        ROS/Med Hx Other: Echo 10/2018  Mild AI  EF 60%  Several back surgery's L2-L5  Spinal cord stimulator by Dr Alcaraz                    Anesthesia Plan    ASA 3     general with block     Anesthetic plan, all risks, benefits, and alternatives have been provided, discussed and informed consent has been obtained with: patient.    Plan discussed with CRNA.

## 2019-08-20 NOTE — OP NOTE
DATE OF PROCEDURE: 08/20/19    PREOPERATIVE DIAGNOSIS:  Right knee arthritis      POSTOPERATIVE DIAGNOSIS:  Right knee arthritis    PROCEDURES PERFORMED:  Right total knee arthroplasty with [employer] components (# 6 narrow posterior stabilized femur, # 4 tibia, 10 mm polyethylene, with 32 mm three peg patella).     SURGEON: Faustino Weinstein MD    ASSISTANT:  Uzma Edge PA-C     SPECIMENS: None    IMPLANTS:   Implants     Implant        Cmt Bone Simplex/P Full Dose 10/Pk - [id number] - Implanted   (Right) Knee    Inventory item: Cmt Bone Simplex/P Full Dose 10/Pk Model/Cat number: [id number]    : [employer] Lot number: [id number]    As of 8/20/2019     Status: Implanted                  Pat Gen2 Resrf 32mm - [id number] - Implanted   (Right) Knee    Inventory item: Pat Gen2 Resrf 32mm Model/Cat number: [id number]    : [employer] Lot number: [id number]    As of 8/20/2019     Status: Implanted                  Fem Legion Oxin Ps Nrw Sz6n Rt - [id number] - Implanted   (Right) Knee    Inventory item: Fem Legion Oxin Ps Nrw Sz6n Rt Model/Cat number: [id number]    : [employer] Lot number: [id number]    As of 8/20/2019     Status: Implanted                  Base Tib/Kn Gen2 Nonpor Ti Sz4 Rt - [id number] - Implanted   (Right) Knee    Inventory item: Base Tib/Kn Gen2 Nonpor Ti Sz4 Rt Model/Cat number: [id number]    : [employer] Lot number: [id number]    As of 8/20/2019     Status: Implanted                  Insrt Art Legion Ps Hf Xlpe Sz3to4 10mm - [id number] - Implanted   (Right) Knee    Inventory item: Insrt Art Legion Ps Hf Xlpe Sz3to4 10mm Model/Cat number: [id number]    : [employer] Lot number: [id number]    Device identifier: [id number] Device identifier type: GS1    As of 8/20/2019     Status: Implanted                         ANESTHESIA:  Spinal    STAFF:  Circulator: Jorge Balbuena RN; Brunilda Barraza RN  Scrub Person:  Melissa Jernigan  Vendor Representative: Jessica Mackenzie Chip  Nursing Assistant: Elizabeth Dang; Whitley Newell  Assistant: Uzma Edge PA-C    TOURNIQUET TIME: 19 minutes    ESTIMATED BLOOD LOSS: 100ml     COMPLICATIONS: None    PREOPERATIVE ANTIBIOTICS:  Ancef 2G    INDICATIONS: The patient is a 79 y.o. female with debilitating right knee pain secondary to osteoarthritis that failed to improve in spite of conservative treatment .  Options have been discussed at length with the patient and the patient has had an extended course of conservative treatment without long-term benefit. The patient has reached the point where the patient desires total knee arthroplasty surgery and understands the risks, benefits, and alternatives. Consent was obtained. Please see my office notes for details with regard to preoperative counseling and operative rationale.     DESCRIPTION OF PROCEDURE: The patient was positively identified in the preoperative holding area and brought to the operating suite and placed in a supine position. After adequate general with regional anesthetic had been achieved, the right lower extremity was prepped and draped in the usual sterile fashion.  After application of a tourniquet to the right upper thigh, which was used during the procedure for a total 19 minutes during the cementation process only. Landmarks of the knee were identified and timeout procedure was performed to confirm the operative site, as well as other parameters. Following the sterile prep and drape, a skin incision was made just off the medial aspect of midline for a medial parapatellar approach. Following a sharp skin incision, dissection was carried down to the level of the extensor mechanism and a medial parapatellar arthrotomy was made and the patella was tucked into the lateral gutter. Description of arthritis: Tricompartmental bone-on-bone contact, with several large loose bodies in the suprapatellar region,  with complete obliteration of the joint spaces.  The knee was adequately exposed and a distal femoral cut was made with an intramedullary guide. This was subsequently sized for a # 6 narrow implant and anterior, posterior chamfer cuts made. The menisci removed both medially and laterally.  The ACL was transected and the tibia was subluxed anteriorly. Proximal tibia cut was made with the external alignment guide. The cut was noted to be perpendicular to the tibial axis, with symmetric flexion and extension gaps. Therefore, final tibial preparations to accommodate a # 4 tibia were made, followed by final femoral preparations for the box region. With a trial 10 mm insert in place, full flexion and extension was noted with no instability. The patella was then prepared for a 32 mm three peg patella which had excellent tracking. All trial components were removed and final components were cemented in place with namely a # 4 tibia, # 6 narrow posterior stabilized femur and a 32 mm three peg patella with a trial 11 mm insert for cement compression. All the excess cement was removed from the bone implant interface and allowed to harden. Tourniquet was deflated. Hemostasis was obtained with electrocautery. There was no brisk bleeding noted in the popliteal fossa in particular. Therefore, the knee was copiously irrigated as it was between major steps, and the final 10 insert was placed as this was deemed appropriate for the patient's anatomy with full flexion and extension and no instability and attention was then directed towards closure. The medial parapatellar arthrotomy was closed with #1 Vicryl in an interrupted figure-of-eight fashion in 4 strategic locations followed by oversewing this from proximal to distal with a #1 StrataFix symmetric, which nicely sealed the joint, followed by closure of the deep fascial layer with #1 Vicryl in a buried interrupted fashion, followed by closure of the subcutaneous layer with 2-0  Vicryl and the skin with 3-0 Stratafix in a running subcuticular fashion. Prineo wound closure dressing was applied followed by a sterile dressing with 4 x 4's, abdominal pad, soft roll and Ace wrap. The patient tolerated the procedure well and was brought to the recovery room in good condition.     PLAN:  1.  The patient will begin early range of motion and weight-bearing per the post total knee arthroplasty protocol.   2.  I anticipate brief hospitalization for initial rehabilitation and pain control followed by continued rehabilitation in either home health or supervised setting.   3.  Postoperative medical management with Dr. Veliz.  4.  Aspirin will be utilized for DVT prophylaxis unless there is a contraindication.       Faustino Weinstein MD  08/20/19  12:08 PM

## 2019-08-20 NOTE — INTERVAL H&P NOTE
Pre-Op H&P (See Recent Office Note Attached for Full H&P)    Chief complaint: Bilateral knee pain, R>L    Review of Systems:  General ROS:  no fever, chills, rashes, No change since last office visit  Cardiovascular ROS: no chest pain or dyspnea on exertion.  History of murmur.  10/2019 TTE EF NL, mild AI  Respiratory ROS: no cough, shortness of breath, or wheezing    Meds:    No current facility-administered medications on file prior to encounter.      Current Outpatient Medications on File Prior to Encounter   Medication Sig Dispense Refill   • acetaminophen (TYLENOL) 500 MG tablet Take 500 mg by mouth Every 6 (Six) Hours As Needed for Mild Pain .     • amLODIPine (NORVASC) 10 MG tablet Take 10 mg by mouth Daily.     • amLODIPine (NORVASC) 5 MG tablet Take 5 mg by mouth Daily.     • aspirin 81 MG EC tablet Take 81 mg by mouth Daily. Stopped in mid Aug 2018     • atorvastatin (LIPITOR) 20 MG tablet Take 10 mg by mouth 2 (Two) Times a Week. MON AND THURS     • Chlorhexidine Gluconate 4 % solution  Shower with hibiclens solution as directed for 5 days prior to surgery 237 mL 0   • Cyanocobalamin (B-12-SL SL) Place 1 tablet under the tongue Daily.     • Dermatological Products, Misc. (EPICERAM) lotion Apply twice daily to back (Patient taking differently: Apply 1 application topically to the appropriate area as directed 2 (Two) Times a Day With Meals. Apply twice daily to back) 225 g 0   • fluticasone (FLONASE) 50 MCG/ACT nasal spray 2 sprays into each nostril As Needed for Rhinitis.     • Krill Oil 500 MG capsule Take 1 tablet by mouth Daily.     • levocetirizine (XYZAL) 5 MG tablet Take 5 mg by mouth Every Evening.     • levothyroxine (SYNTHROID, LEVOTHROID) 112 MCG tablet Take 112 mcg by mouth Daily.     • methocarbamol (ROBAXIN) 750 MG tablet Take 1 tablet by mouth 2 (Two) Times a Day. 60 tablet 5   • Multiple Vitamins-Minerals (MULTIVITAMIN WITH MINERALS) tablet tablet Take 1 tablet by mouth Daily.     •  mupirocin (BACTROBAN) 2 % ointment Apply into the nostril(s) as directed by provider 2 (Two) Times a Day. 22 g 0   • Omega-3 1000 MG capsule Take 1 tablet by mouth Daily.     • pyridoxine (VITAMIN B-6) 25 MG tablet Take 1 tablet by mouth 3 (Three) Times a Day. 90 tablet 5   • quinapril-hydrochlorothiazide (ACCURETIC) 20-25 MG per tablet Take 1 tablet by mouth Daily.     • traMADol (ULTRAM) 50 MG tablet Take 1 tablet by mouth Every 6 (Six) Hours As Needed for Moderate Pain . 60 tablet    • VITAMIN D, ERGOCALCIFEROL, PO Take 5,000 Units by mouth Daily.         Vital Signs:  Breastfeeding? No     Physical Exam:    CV:  S1S2 regular rate and rhythm, 1/6 systolic murmur               Resp:  Clear to auscultation; respirations regular, even and unlabored    Results Review:     Lab Results   Component Value Date    WBC 5.89 08/06/2019    HGB 13.5 08/06/2019    HCT 41.7 08/06/2019    MCV 94.6 08/06/2019     08/06/2019    NEUTROABS 3.26 08/06/2019    GLUCOSE 103 (H) 08/06/2019    BUN 17 08/06/2019    CREATININE 0.63 08/06/2019    EGFRIFNONA 92 08/06/2019     08/06/2019    K 3.8 08/06/2019    CL 99 08/06/2019    CO2 26.0 08/06/2019    CALCIUM 10.4 08/06/2019        I reviewed the patient's new clinical results.    Cancer Staging (if applicable)  Cancer Patient: __ yes _x_no __unknown; If yes, clinical stage T:__ N:__M:__, stage group or __N/A    Assessment/Plan:    1. Primary osteoarthritis of right knee    2. Primary osteoarthritis of left knee        I reviewed my findings with patient today.  She has severe advanced bilateral knee arthritis, and she would like to consider knee replacement surgery, and would like to go ahead and schedule if possible.  She has exhausted conservative treatment options.  Please see my counseling note for details.  She would like to proceed with the right knee first.  She was inquiring about bilateral total knee replacement surgeries in the same sitting, and I discussed that the  risks are increased with doing both knees at the same time.  I have some concerns about doing both knees at the same time in her situation, and she understands.    Surgical Counseling     I have informed the patient of the diagnosis and the prognosis.  Exhaustive conservative treatment modalities have not resulted in long term pain relief.  The symptoms have progressed to the point of daily pain and inability to perform activities of daily living without significant pain. The patient has reached the point of desiring to proceed with total knee arthroplasty after discussing the risks, benefits and alternatives to the procedure.  The surgical procedure itself was discussed in detail. Risks of the procedure were discussed, which included but are not limited to, bleeding, infection, damage to blood vessels and nerves, incomplete pain relief, loosening of the prosthesis, deep infection, need for further surgery, loss of limb, deep venous thrombosis, pulmonary embolus, death, heart attack, stroke, kidney failure, liver failure, and anesthetic complications.  In addition, the potential for deep infection developing in the future was discussed, which could require further surgery.  The knee would have to be re-opened, debrided, and potentially remove the prosthesis, which may or may not be replaced in the future.  Also, the possibility for loosening of the prosthesis has been mentioned.  If the prosthesis loosened, a revision arthroplasty could be performed, with results that are not as predictable compared to the original procedure.  The typical rehabilitative course has also been discussed, and full recovery may take up to a year to see the maximum benefit.  The importance of patient cooperation in the rehabilitative efforts has also been discussed.  No guarantees whatsoever were given.  The patient understands the potential risks versus the benefits and desires to proceed with total knee arthroplasty.    Corina CARLTON  AMITA Ventura  8/20/2019   8:25 AM      Agree with above.  Plan for right total knee arthroplasty.

## 2019-08-21 PROBLEM — D62 ACUTE BLOOD LOSS ANEMIA: Status: ACTIVE | Noted: 2019-08-21

## 2019-08-21 PROBLEM — D72.829 LEUKOCYTOSIS: Status: ACTIVE | Noted: 2019-08-21

## 2019-08-21 PROBLEM — G89.18 ACUTE POSTOPERATIVE PAIN: Status: ACTIVE | Noted: 2019-08-21

## 2019-08-21 LAB
ANION GAP SERPL CALCULATED.3IONS-SCNC: 12 MMOL/L (ref 5–15)
BUN BLD-MCNC: 21 MG/DL (ref 8–23)
BUN/CREAT SERPL: 26.9 (ref 7–25)
CALCIUM SPEC-SCNC: 8.3 MG/DL (ref 8.6–10.5)
CHLORIDE SERPL-SCNC: 102 MMOL/L (ref 98–107)
CO2 SERPL-SCNC: 23 MMOL/L (ref 22–29)
CREAT BLD-MCNC: 0.78 MG/DL (ref 0.57–1)
DEPRECATED RDW RBC AUTO: 49.4 FL (ref 37–54)
ERYTHROCYTE [DISTWIDTH] IN BLOOD BY AUTOMATED COUNT: 13.8 % (ref 12.3–15.4)
GFR SERPL CREATININE-BSD FRML MDRD: 72 ML/MIN/1.73
GLUCOSE BLD-MCNC: 155 MG/DL (ref 65–99)
HCT VFR BLD AUTO: 29.1 % (ref 34–46.6)
HGB BLD-MCNC: 9.6 G/DL (ref 12–15.9)
MCH RBC QN AUTO: 32.1 PG (ref 26.6–33)
MCHC RBC AUTO-ENTMCNC: 33 G/DL (ref 31.5–35.7)
MCV RBC AUTO: 97.3 FL (ref 79–97)
PLATELET # BLD AUTO: 233 10*3/MM3 (ref 140–450)
PMV BLD AUTO: 9.3 FL (ref 6–12)
POTASSIUM BLD-SCNC: 4.6 MMOL/L (ref 3.5–5.2)
RBC # BLD AUTO: 2.99 10*6/MM3 (ref 3.77–5.28)
SODIUM BLD-SCNC: 137 MMOL/L (ref 136–145)
WBC NRBC COR # BLD: 11.47 10*3/MM3 (ref 3.4–10.8)

## 2019-08-21 PROCEDURE — 25010000003 CEFAZOLIN IN DEXTROSE 2-4 GM/100ML-% SOLUTION: Performed by: ORTHOPAEDIC SURGERY

## 2019-08-21 PROCEDURE — 97166 OT EVAL MOD COMPLEX 45 MIN: CPT

## 2019-08-21 PROCEDURE — 97116 GAIT TRAINING THERAPY: CPT

## 2019-08-21 PROCEDURE — 97535 SELF CARE MNGMENT TRAINING: CPT

## 2019-08-21 PROCEDURE — 97110 THERAPEUTIC EXERCISES: CPT

## 2019-08-21 PROCEDURE — 80048 BASIC METABOLIC PNL TOTAL CA: CPT | Performed by: NURSE PRACTITIONER

## 2019-08-21 PROCEDURE — 85027 COMPLETE CBC AUTOMATED: CPT | Performed by: ORTHOPAEDIC SURGERY

## 2019-08-21 PROCEDURE — 99024 POSTOP FOLLOW-UP VISIT: CPT | Performed by: ORTHOPAEDIC SURGERY

## 2019-08-21 RX ADMIN — SODIUM CHLORIDE, PRESERVATIVE FREE 3 ML: 5 INJECTION INTRAVENOUS at 21:06

## 2019-08-21 RX ADMIN — MELOXICAM 15 MG: 7.5 TABLET ORAL at 08:25

## 2019-08-21 RX ADMIN — METHOCARBAMOL 750 MG: 750 TABLET, FILM COATED ORAL at 21:05

## 2019-08-21 RX ADMIN — ACETAMINOPHEN 650 MG: 325 TABLET ORAL at 16:30

## 2019-08-21 RX ADMIN — SODIUM CHLORIDE, PRESERVATIVE FREE 10 ML: 5 INJECTION INTRAVENOUS at 08:27

## 2019-08-21 RX ADMIN — HYDROCODONE BITARTRATE AND ACETAMINOPHEN 1 TABLET: 5; 325 TABLET ORAL at 12:58

## 2019-08-21 RX ADMIN — LEVOTHYROXINE SODIUM 112 MCG: 112 TABLET ORAL at 08:25

## 2019-08-21 RX ADMIN — HYDROCODONE BITARTRATE AND ACETAMINOPHEN 1 TABLET: 5; 325 TABLET ORAL at 17:08

## 2019-08-21 RX ADMIN — HYDROCODONE BITARTRATE AND ACETAMINOPHEN 1 TABLET: 5; 325 TABLET ORAL at 21:05

## 2019-08-21 RX ADMIN — DOCUSATE SODIUM 100 MG: 100 CAPSULE, LIQUID FILLED ORAL at 21:05

## 2019-08-21 RX ADMIN — HYDROCODONE BITARTRATE AND ACETAMINOPHEN 1 TABLET: 5; 325 TABLET ORAL at 04:12

## 2019-08-21 RX ADMIN — HYDROCODONE BITARTRATE AND ACETAMINOPHEN 1 TABLET: 5; 325 TABLET ORAL at 08:26

## 2019-08-21 RX ADMIN — LISINOPRIL 20 MG: 20 TABLET ORAL at 08:26

## 2019-08-21 RX ADMIN — CEFAZOLIN SODIUM 2 G: 2 INJECTION, SOLUTION INTRAVENOUS at 01:40

## 2019-08-21 RX ADMIN — AMLODIPINE BESYLATE 5 MG: 5 TABLET ORAL at 08:25

## 2019-08-21 RX ADMIN — ASPIRIN 325 MG: 325 TABLET, DELAYED RELEASE ORAL at 08:26

## 2019-08-21 NOTE — PLAN OF CARE
Problem: Patient Care Overview  Goal: Plan of Care Review  Outcome: Ongoing (interventions implemented as appropriate)   08/21/19 0046   Coping/Psychosocial   Plan of Care Reviewed With patient   Plan of Care Review   Progress improving   OTHER   Outcome Summary Pt.continues to improve. VSS ambulating with physical therapy. Pain is controlled well with oral meds. Awaits rehab placement.     Goal: Individualization and Mutuality  Outcome: Ongoing (interventions implemented as appropriate)    Goal: Discharge Needs Assessment  Outcome: Ongoing (interventions implemented as appropriate)    Goal: Interprofessional Rounds/Family Conf  Outcome: Ongoing (interventions implemented as appropriate)

## 2019-08-21 NOTE — THERAPY TREATMENT NOTE
"Patient Name: Jewels Venegas  : 1940    MRN: [id number]                              Today's Date: 2019       Admit Date: 2019    Visit Dx:     ICD-10-CM ICD-9-CM   1. Impaired functional mobility, balance, gait, and endurance Z74.09 V49.89   2. Primary osteoarthritis of right knee M17.11 715.16   3. Impaired mobility and ADLs Z74.09 799.89     Patient Active Problem List   Diagnosis   • Post laminectomy syndrome   • S/P insertion of spinal cord stimulator   • Osteoporosis   • Lumbar stenosis with neurogenic claudication   • Polyneuropathy   • Spondylarthrosis   • Benign paroxysmal positional vertigo   • Insomnia due to medical condition   • At high risk for falls   • Physical deconditioning   • Encounter for fitting and adjustment of neuropacemaker of spinal cord   • Primary osteoarthritis of both knees   • Primary osteoarthritis of right knee   • Status post total right knee replacement   • HTN (hypertension)   • HLD (hyperlipidemia)   • Hypothyroid     Past Medical History:   Diagnosis Date   • Arthritis    • Back pain    • History of kidney stones    • Hyperlipidemia     triglycerides   • Hypertension    • Osteoporosis    • Seasonal allergies    • Thyroid disease    • TIA (transient ischemic attack)     \"Possibly\"   • Urinary frequency    • Urinary urgency    • Vertigo    • Wears dentures    • Wears glasses      Past Surgical History:   Procedure Laterality Date   • APPENDECTOMY     • CARDIAC CATHETERIZATION     • CHOLECYSTECTOMY     • COLONOSCOPY     • LUMBAR LAMINECTOMY  2015    Dr. Domenico Reid, [facility] L2-5   • SPINAL CORD STIMULATOR IMPLANT N/A 10/1/2018    Procedure: SPINAL CORD STIMULATOR INSERTION PHASE 1 ;  Surgeon: Vince Olivas MD;  Location:  [facility];  Service: Neurosurgery   • TONSILLECTOMY     • TOTAL KNEE ARTHROPLASTY Right 2019    Procedure: TOTAL KNEE ARTHROPLASTY RIGHT;  Surgeon: Faustino Weinstein MD;  Location:  [facility];  Service: " Orthopedics     General Information     Row Name 08/21/19 1135 08/21/19 0807       PT Evaluation Time/Intention    Subjective Information  --  complains of;pain  -LC    Document Type  therapy note (daily note)  -AA  evaluation  -    Mode of Treatment  physical therapy;individual therapy  -AA  individual therapy;occupational therapy  -    Patient Effort  --  good  -LC    Row Name 08/21/19 1135 08/21/19 0807       General Information    Patient Profile Reviewed?  yes  -AA  yes  -LC    Referring Physician  --  MD Js  -    Patient Observations  --  alert;cooperative;agree to therapy  -    Patient/Family Observations  --  Daughter present for session  -    General Observations of Patient  --  Pt. sitting EOB   -    Prior Level of Function  --  independent:;grooming;feeding;min assist:;all household mobility;ADL's;transfer  -    Equipment Currently Used at Home  --  raised toilet;walker, rolling;rollator;cane, quad;grab bar;shower chair  -    Pertinent History of Current Functional Problem  --  Pt. presents w/ R knee pain that failed to improve w/ conservative measures. Pt. now POD 1 s/p R TKA.    -LC    Existing Precautions/Restrictions  fall;other (see comments) adductor nerve cath  -AA  fall;other (see comments) Lateral Hip, Adductor Canal Nerve Catheter  -    Equipment Issued to Patient  --  leg   -    Risks Reviewed  --  patient:;family:;nausea/vomiting;dizziness;lines disloged;increased discomfort  -LC    Benefits Reviewed  --  patient and family:;improve function;increase independence;increase strength;increase balance;decrease pain  -    Barriers to Rehab  --  previous functional deficit  -    Row Name 08/21/19 0807          Relationship/Environment    Primary Source of Support/Comfort  child(darius);pet  -LC     Lives With  alone  -     Family Caregiver if Needed  child(darius), adult  -     Row Name 08/21/19 0807          Resource/Environmental Concerns    Current Living  Arrangements  home/apartment/condo  -     Resource/Environmental Concerns  none  -     Row Name 08/21/19 0807          Home Main Entrance    Number of Stairs, Main Entrance  other (see comments);none Ramp   -     Row Name 08/21/19 1135 08/21/19 0807       Cognitive Assessment/Intervention- PT/OT    Affect/Mental Status (Cognitive)  --  [facility]  -    Orientation Status (Cognition)  oriented x 4  -AA  oriented x 4  -    Follows Commands (Cognition)  --  follows one step commands;over 90% accuracy  -    Cognitive Function (Cognitive)  --  WFL  -    Row Name 08/21/19 1135          Safety Issues, Functional Mobility    Safety Issues Affecting Function (Mobility)  awareness of need for assistance;insight into deficits/self awareness;sequencing abilities  -     Impairments Affecting Function (Mobility)  endurance/activity tolerance;pain;range of motion (ROM);strength  -       User Key  (r) = Recorded By, (t) = Taken By, (c) = Cosigned By    Initials Name Provider Type    AA Michelle Meyer, PT Physical Therapist    LC Danisha Cardozo, BRENT Occupational Therapist        Mobility     Row Name 08/21/19 1138          Bed Mobility Assessment/Treatment    Comment (Bed Mobility)  pt [facility]  -     Row Name 08/21/19 1138 08/21/19 0807       Transfer Assessment/Treatment    Transfer Assessment/Treatment  --  sit-stand transfer;stand-sit transfer;bed-chair transfer;toilet transfer  -    Comment (Transfers)  VC for stepping RLE in front prior to stand/sit; VC for reaching back  -  vc'ing to push up from surface to stand and to reach back for surface to lower to slowly slower to sit. vc's to step RLE out for comfort during txf.   -    Stand-Sit Sallis (Transfers)  --  verbal cues;minimum assist (75% patient effort)  -    [place]is Level (Toilet Transfer)  --  minimum assist (75% patient effort);verbal cues  -    Assistive Device (Toilet Transfer)  --  walker, front-wheeled;grab bars/safety frame;raised  toilet seat  -    Row Name 08/21/19 0807          Bed-Chair Transfer    Bed-Chair Clifford (Transfers)  minimum assist (75% patient effort);verbal cues  -     Assistive Device (Bed-Chair Transfers)  walker, front-wheeled  -LC     Row Name 08/21/19 1138 08/21/19 0807       Sit-Stand Transfer    Sit-Stand Clifford (Transfers)  verbal cues;contact guard  -  verbal cues;contact guard  -    Assistive Device (Sit-Stand Transfers)  --  walker, front-wheeled  -LC    Row Name 08/21/19 1152 08/21/19 1138       Gait/Stairs Assessment/Training    38206 - Gait Training Minutes   14  -AA  --    Clifford Level (Gait)  --  contact guard;verbal cues  -AA    Assistive Device (Gait)  --  walker, front-wheeled  -    Distance in Feet (Gait)  --  175  -AA    Deviations/Abnormal Patterns (Gait)  --  bilateral deviations;giuseppe decreased;gait speed decreased;stride length decreased;right sided deviations;antalgic  -AA    Bilateral Gait Deviations  --  heel strike decreased;forward flexed posture  -AA    Right Sided Gait Deviations  --  weight shift ability decreased  -AA    Comment (Gait/Stairs)  --   pt ambulated with improved step length and heel strike with continued slow giuseppe; VC for knee ROM during swing phase.  VC for WB during stance phase.  No increase pain with gait  -    Row Name 08/21/19 1138 08/21/19 0807       Mobility Assessment/Intervention    Extremity Weight-bearing Status  right lower extremity  -AA  right lower extremity  -    Right Lower Extremity (Weight-bearing Status)  weight-bearing as tolerated (WBAT)  -AA  weight-bearing as tolerated (WBAT)  -      User Key  (r) = Recorded By, (t) = Taken By, (c) = Cosigned By    Initials Name Provider Type    AA Michelle Meyer, PT Physical Therapist     Danisha Cardozo OT Occupational Therapist        Obj/Interventions     Row Name 08/21/19 1139 08/21/19 0807       General ROM    GENERAL ROM COMMENTS  R knee 0-100 degrees sitting AROM  -AA  WFL   -    Row Name 08/21/19 0807          MMT (Manual Muscle Testing)    General MMT Comments  WFL  -     Row Name 08/21/19 1139          Therapeutic Exercise    Lower Extremity (Therapeutic Exercise)  gluteal sets;heel slides, right;LAQ (long arc quad), right;quad sets, bilateral;SLR (straight leg raise), right  -AA     Lower Extremity Range of Motion (Therapeutic Exercise)  ankle dorsiflexion/plantar flexion, bilateral;knee flexion/extension, right  -AA     Exercise Type (Therapeutic Exercise)  AROM (active range of motion)  -AA     Position (Therapeutic Exercise)  seated  -AA     Sets/Reps (Therapeutic Exercise)  10  -AA     Comment (Therapeutic Exercise)  improved ROM this date with no pain increase  -AA     Row Name 08/21/19 1139          Static Sitting Balance    Level of [place] (Unsupported Sitting, Static Balance)  independent  -AA     Sitting Position (Unsupported Sitting, Static Balance)  sitting in chair  -AA     Time Able to Maintain Position (Unsupported Sitting, Static Balance)  more than 5 minutes  -AA     Row Name 08/21/19 1139          Dynamic Sitting Balance    Level of [place], Reaches Outside Midline (Sitting, Dynamic Balance)  contact guard assist  -AA     Sitting Position, Reaches Outside Midline (Sitting, Dynamic Balance)  sitting in chair  -AA     Row Name 08/21/19 1139          Static Standing Balance    Level of [place] (Supported Standing, Static Balance)  contact guard assist  -AA     Time Able to Maintain Position (Supported Standing, Static Balance)  4 to 5 minutes  -AA     Assistive Device Utilized (Supported Standing, Static Balance)  walker, rolling  -AA     Row Name 08/21/19 1139          Dynamic Standing Balance    Level of [place], Reaches Outside Midline (Standing, Dynamic Balance)  contact guard assist  -AA     Time Able to Maintain Position, Reaches Outside Midline (Standing, Dynamic Balance)  4 to 5 minutes  -AA     Assistive Device Utilized (Supported  Standing, Dynamic Balance)  walker, rolling  -AA       User Key  (r) = Recorded By, (t) = Taken By, (c) = Cosigned By    Initials Name Provider Type    Michelle Farnsworth, PT Physical Therapist    Danisha Ernst OT Occupational Therapist        Goals/Plan    No documentation.       Clinical Impression     Row Name 08/21/19 1145 08/21/19 0807       Pain Scale: Numbers Pre/Post-Treatment    Pain Scale: Numbers, Pretreatment  1/10  -AA  3/10  -LC    Pain Scale: Numbers, Post-Treatment  3/10  -AA  3/10  -LC    Pain Location - Side  Right  -AA  Right  -LC    Pain Location - Orientation  generalized  -AA  generalized  -LC    Pain Location  knee  -AA  knee  -LC    Pre/Post Treatment Pain Comment  pt premedicated  -AA  Nsg administered pain meds  -    Pain Intervention(s)  Cold applied;Ambulation/increased activity;Repositioned  -AA  Cold applied;Repositioned;Ambulation/increased activity  -LC    Row Name 08/21/19 1148 08/21/19 1145       Plan of Care Review    Plan of Care Reviewed With  patient;daughter  -AA  patient;daughter  -AA    Row Name 08/21/19 0826 08/21/19 0807       Plan of Care Review    Plan of Care Reviewed With  patient;family  -NB  patient;daughter  -LC    Row Name 08/21/19 0345          Plan of Care Review    Plan of Care Reviewed With  patient  -JF     Row Name 08/21/19 1145 08/21/19 0807       Positioning and Restraints    Pre-Treatment Position  sitting in chair/recliner  -AA  other (comment)  -    Post Treatment Position  chair  -AA  chair EOB  -LC    In Chair  notified nsg;exit alarm on;reclined;with family/caregiver;call light within reach;encouraged to call for assist  -AA  sitting;call light within reach;encouraged to call for assist;exit alarm on;with family/caregiver;reclined;notified nsg  -      User Key  (r) = Recorded By, (t) = Taken By, (c) = Cosigned By    Initials Name Provider Type    Mick Encarnacion, RN Registered Nurse    Michelle Farnsworth, PT Physical Therapist    JESSICA  Danisha Cardozo, OT Occupational Therapist    Renée Amador, RN Registered Nurse        Outcome Measures     Row Name 08/21/19 1147          How much help from another person do you currently need...    Turning from your back to your side while in flat bed without using bedrails?  3  -AA     Moving from lying on back to sitting on the side of a flat bed without bedrails?  3  -AA     Moving to and from a bed to a chair (including a wheelchair)?  3  -AA     Standing up from a chair using your arms (e.g., wheelchair, bedside chair)?  3  -AA     Climbing 3-5 steps with a railing?  2  -AA     To walk in hospital room?  3  -AA     AM-PAC 6 Clicks Score (PT)  17  -AA     Row Name 08/21/19 1147 08/21/19 0807       Functional Assessment    Outcome Measure Options  AM-PAC 6 Clicks Basic Mobility (PT)  -AA  AM-PAC 6 Clicks Daily Activity (OT)  -      User Key  (r) = Recorded By, (t) = Taken By, (c) = Cosigned By    Initials Name Provider Type    Michelle Farnsworth, PT Physical Therapist    Danisha Ernst, OT Occupational Therapist        Physical Therapy Education     Title: PT OT SLP Therapies (In Progress)     Topic: Physical Therapy (Done)     Point: Mobility training (Done)     Learning Progress Summary           Patient RACHELL Bravo D,H, VU,NR by RUCHI at 8/21/2019 11:47 AM    Acceptance, HEIDI LOVETT, VU,NR by ANIYA at 8/20/2019  4:19 PM    Comment:  Educated on precautions, weight bearing status, correct supine to sit t/f technique, correct sit<->stand t/f technique, correct gait mechanics, and progression of POC.   Family RACHELL Bravo D,H, VU,NR by RUCHI at 8/21/2019 11:47 AM    Acceptance, RACHELL,D, VU,NR by ANIYA at 8/20/2019  4:19 PM    Comment:  Educated on precautions, weight bearing status, correct supine to sit t/f technique, correct sit<->stand t/f technique, correct gait mechanics, and progression of POC.                   Point: Home exercise program (Done)     Learning Progress Summary           Patient RACHELL Bravo D,H, VU,NR by AA at  8/21/2019 11:47 AM    Acceptance, E,D, VU,NR by LR at 8/20/2019  4:19 PM    Comment:  Educated on precautions, weight bearing status, correct supine to sit t/f technique, correct sit<->stand t/f technique, correct gait mechanics, and progression of POC.   Family Lawrence, E,D,H, VU,NR by AA at 8/21/2019 11:47 AM    Acceptance, E,D, VU,NR by LR at 8/20/2019  4:19 PM    Comment:  Educated on precautions, weight bearing status, correct supine to sit t/f technique, correct sit<->stand t/f technique, correct gait mechanics, and progression of POC.                   Point: Body mechanics (Done)     Learning Progress Summary           Patient Fabiolaer, E,D,H, VU,NR by AA at 8/21/2019 11:47 AM    Acceptance, E,D, VU,NR by LR at 8/20/2019  4:19 PM    Comment:  Educated on precautions, weight bearing status, correct supine to sit t/f technique, correct sit<->stand t/f technique, correct gait mechanics, and progression of POC.   Family Lawrence, E,D,H, VU,NR by AA at 8/21/2019 11:47 AM    Acceptance, E,D, VU,NR by LR at 8/20/2019  4:19 PM    Comment:  Educated on precautions, weight bearing status, correct supine to sit t/f technique, correct sit<->stand t/f technique, correct gait mechanics, and progression of POC.                   Point: Precautions (Done)     Learning Progress Summary           Patient Lawrence, E,D,H, VU,NR by AA at 8/21/2019 11:47 AM    Acceptance, E,D, VU,NR by LR at 8/20/2019  4:19 PM    Comment:  Educated on precautions, weight bearing status, correct supine to sit t/f technique, correct sit<->stand t/f technique, correct gait mechanics, and progression of POC.   Family Lawrence, E,D,H, VU,NR by AA at 8/21/2019 11:47 AM    Acceptance, E,D, VU,NR by LR at 8/20/2019  4:19 PM    Comment:  Educated on precautions, weight bearing status, correct supine to sit t/f technique, correct sit<->stand t/f technique, correct gait mechanics, and progression of POC.                               User Key     Initials Effective  Dates Name Provider Type Discipline    LR 06/19/15 -  Danisha Navarro, PT Physical Therapist PT    AA 04/02/18 -  Michelle Meyer PT Physical Therapist PT              PT Recommendation and Plan     Outcome Summary/Treatment Plan (PT)  Anticipated Discharge Disposition (PT): inpatient rehabilitation facility  Plan of Care Reviewed With: patient, daughter  Progress: improving  Outcome Summary: Pt ambulated 175 feet with RW and CGA with cues for step length, giuseppe, and knee ROM.  CGA for transfers.  Pain 1-3/10.  R knee AROM 0-100 degrees pt sitting in chair.  Adductor nerve cath intact.  HEP reviewed and packet reviewed with pt and daughter      Time Calculation:   PT Charges     Row Name 08/21/19 1152             Time Calculation    Start Time  0918  -AA      PT Received On  08/21/19  -RUCHI      PT Goal Re-Cert Due Date  08/30/19  -RUCHI         Time Calculation- PT    Total Timed Code Minutes- PT  29 minute(s)  -AA         Timed Charges    22110 - PT Therapeutic Exercise Minutes  15  -AA      05340 - Gait Training Minutes   14  -AA        User Key  (r) = Recorded By, (t) = Taken By, (c) = Cosigned By    Initials Name Provider Type    AA Michelle Meyer, PT Physical Therapist        Therapy Charges for Today     Code Description Service Date Service Provider Modifiers Qty    44948583020 HC PT THER PROC EA 15 MIN 8/21/2019 Michelle Meyer, PT  1    49336822562 HC GAIT TRAINING EA 15 MIN 8/21/2019 Michelle Meyer, PT  1          PT G-Codes  Outcome Measure Options: AM-PAC 6 Clicks Basic Mobility (PT)  AM-PAC 6 Clicks Score (PT): 17  AM-PAC 6 Clicks Score (OT): 20    Michelle Meyer PT  8/21/2019

## 2019-08-21 NOTE — PLAN OF CARE
Problem: Patient Care Overview  Goal: Plan of Care Review  Outcome: Ongoing (interventions implemented as appropriate)   08/21/19 1403   Coping/Psychosocial   Plan of Care Reviewed With patient;daughter   Plan of Care Review   Progress improving   OTHER   Outcome Summary Pt ambulated 250 feet with RW and CGA with cues for R knee ROM and heel strike. Transfer CGA. HEP reviewed and completed. Adductor nerve cath intact. Good SLR RLE supine. Recommend DC to inpatient rehab

## 2019-08-21 NOTE — PLAN OF CARE
Problem: Patient Care Overview  Goal: Plan of Care Review  Outcome: Ongoing (interventions implemented as appropriate)   08/21/19 1136   Coping/Psychosocial   Plan of Care Reviewed With patient   Plan of Care Review   Progress improving   OTHER   Outcome Summary Pt continues to do well. Pt is A&Ox4. Pt VSS throughout shift. Pt ace remains c/d/i. Pt arrow continues to infuse at 10 mL/hr, and has not needed to be turned up during the shift. Pt continues to ambulate well w assist of one, gait belt and walker. Pt continues to void spontaneously. Pt pain has been well controlled w PO pain meds. Will continue to monitor.        Problem: Fall Risk (Adult)  Goal: Identify Related Risk Factors and Signs and Symptoms  Outcome: Ongoing (interventions implemented as appropriate)    Goal: Absence of Fall  Outcome: Ongoing (interventions implemented as appropriate)      Problem: Knee Arthroplasty (Total, Partial) (Adult)  Goal: Signs and Symptoms of Listed Potential Problems Will be Absent, Minimized or Managed (Knee Arthroplasty)  Outcome: Ongoing (interventions implemented as appropriate)

## 2019-08-21 NOTE — THERAPY EVALUATION
"Acute Care - Occupational Therapy Initial Evaluation  [facility]     Patient Name: Jewels Venegas  : 1940  MRN: [id number]  Today's Date: 2019     Date of Referral to OT: 19  Referring Physician: MD Js    Admit Date: 2019       ICD-10-CM ICD-9-CM   1. Impaired functional mobility, balance, gait, and endurance Z74.09 V49.89   2. Primary osteoarthritis of right knee M17.11 715.16   3. Impaired mobility and ADLs Z74.09 799.89     Patient Active Problem List   Diagnosis   • Post laminectomy syndrome   • S/P insertion of spinal cord stimulator   • Osteoporosis   • Lumbar stenosis with neurogenic claudication   • Polyneuropathy   • Spondylarthrosis   • Benign paroxysmal positional vertigo   • Insomnia due to medical condition   • At high risk for falls   • Physical deconditioning   • Encounter for fitting and adjustment of neuropacemaker of spinal cord   • Primary osteoarthritis of both knees   • Primary osteoarthritis of right knee   • Status post total right knee replacement   • HTN (hypertension)   • HLD (hyperlipidemia)   • Hypothyroid     Past Medical History:   Diagnosis Date   • Arthritis    • Back pain    • History of kidney stones    • Hyperlipidemia     triglycerides   • Hypertension    • Osteoporosis    • Seasonal allergies    • Thyroid disease    • TIA (transient ischemic attack)     \"Possibly\"   • Urinary frequency    • Urinary urgency    • Vertigo    • Wears dentures    • Wears glasses      Past Surgical History:   Procedure Laterality Date   • APPENDECTOMY     • CARDIAC CATHETERIZATION     • CHOLECYSTECTOMY     • COLONOSCOPY     • LUMBAR LAMINECTOMY  2015    Dr. Domenico Reid, [facility] L2-5   • SPINAL CORD STIMULATOR IMPLANT N/A 10/1/2018    Procedure: SPINAL CORD STIMULATOR INSERTION PHASE 1 ;  Surgeon: Vince Olivas MD;  Location: [facility];  Service: Neurosurgery   • TONSILLECTOMY            OT ASSESSMENT FLOWSHEET (last 12 hours)    "   Occupational Therapy Evaluation     Row Name 08/21/19 0807                   OT Evaluation Time/Intention    Subjective Information  complains of;pain  -LC        Document Type  evaluation  -LC        Mode of Treatment  individual therapy;occupational therapy  -LC        Patient Effort  good  -LC           General Information    Patient Profile Reviewed?  yes  -LC        Referring Physician  MD Js  -        Patient Observations  alert;cooperative;agree to therapy  -LC        Patient/Family Observations  Daughter present for session  -        General Observations of Patient  Pt. sitting EOB   -LC        Prior Level of Function  independent:;grooming;feeding;min assist:;all household mobility;ADL's;transfer  -LC        Equipment Currently Used at Home  raised toilet;walker, rolling;rollator;cane, quad;grab bar;shower chair  -        Pertinent History of Current Functional Problem  Pt. presents w/ R knee pain that failed to improve w/ conservative measures. Pt. now POD 1 s/p R TKA.    -LC        Existing Precautions/Restrictions  fall;other (see comments) Lateral Hip, Adductor Canal Nerve Catheter  -LC        Equipment Issued to Patient  leg   -LC        Risks Reviewed  patient:;family:;nausea/vomiting;dizziness;lines disloged;increased discomfort  -LC        Benefits Reviewed  patient and family:;improve function;increase independence;increase strength;increase balance;decrease pain  -LC        Barriers to Rehab  previous functional deficit  -LC           Relationship/Environment    Primary Source of Support/Comfort  child(darius);pet  -LC        Lives With  alone  -LC        Family Caregiver if Needed  child(darius), adult  -LC           Resource/Environmental Concerns    Current Living Arrangements  home/apartment/condo  -        Resource/Environmental Concerns  none  -LC           Home Main Entrance    Number of Stairs, Main Entrance  other (see comments);none Ramp   -LC           Cognitive  Assessment/Interventions    Additional Documentation  Cognitive Assessment/Intervention (Group)  -           Cognitive Assessment/Intervention- PT/OT    Affect/Mental Status (Cognitive)  WFL  -        Orientation Status (Cognition)  oriented x 4  -        Follows Commands (Cognition)  follows one step commands;over 90% accuracy  -        Cognitive Function (Cognitive)  WFL  -           Mobility Assessment/Treatment    Extremity Weight-bearing Status  right lower extremity  -        Right Lower Extremity (Weight-bearing Status)  weight-bearing as tolerated (WBAT)  -           Functional Mobility    Functional Mobility- Ind. Level  contact guard assist  -        Functional Mobility- Device  rolling walker  -        Functional Mobility-Distance (Feet)  12  -        Functional Mobility- Safety Issues  step length decreased  -        Functional Mobility- Comment  Pt. ambulated to bathroom and back to chair w/ CGA.  -           Transfer Assessment/Treatment    Transfer Assessment/Treatment  sit-stand transfer;stand-sit transfer;bed-chair transfer;toilet transfer  -        Comment (Transfers)  vc'ing to push up from surface to stand and to reach back for surface to lower to slowly slower to sit. vc's to step RLE out for comfort during txf.   -           Bed-Chair Transfer    Bed-Chair [place] (Transfers)  minimum assist (75% patient effort);verbal cues  -        Assistive Device (Bed-Chair Transfers)  walker, front-wheeled  -           Sit-Stand Transfer    Sit-Stand [place] (Transfers)  verbal cues;contact guard  -        Assistive Device (Sit-Stand Transfers)  walker, front-wheeled  -LC           Stand-Sit Transfer    Stand-Sit [place] (Transfers)  verbal cues;minimum assist (75% patient effort)  -        Assistive Device (Stand-Sit Transfers)  walker, front-wheeled  -           Toilet Transfer    Type (Toilet Transfer)  stand-sit;sit-stand  -        [place]  Level (Toilet Transfer)  minimum assist (75% patient effort);verbal cues  -        Assistive Device (Toilet Transfer)  walker, front-wheeled;grab bars/safety frame;raised toilet seat  -           ADL Assessment/Intervention    82021 - OT Self Care/Mgmt Minutes  25  -        BADL Assessment/Intervention  lower body dressing;grooming;toileting  -           Lower Body Dressing Assessment/Training    Lower Body Dressing [place] Level  doff;don;pants/bottoms;socks;verbal cues;minimum assist (75% patient effort)  -        Assistive Devices (Lower Body Dressing)  reacher  -        Lower Body Dressing Position  edge of bed sitting  -        Comment (Lower Body Dressing)  Trialed reacher to don/doff undergarments. Educated pt. and daughter on AC block management when donning/doffing pants. Pt. states she has reacher and long handled shoe horn.   -           Grooming Assessment/Training    [place] Level (Grooming)  verbal cues;contact guard assist;hair care, combing/brushing;oral care regimen;wash face, hands  -        Grooming Position  supported standing  -        Comment (Grooming)  Pt. stood at sink ~ 5 min to complete grooming tasks.   -           Toileting Assessment/Training    [place] Level (Toileting)  perform perineal hygiene;minimum assist (75% patient effort);adjust/manage clothing  -        Assistive Devices (Toileting)  raised toilet seat;grab bar/safety frame  -        Toileting Position  unsupported sitting  -        Comment (Toileting)  Pt. required min A and vc's w/ undergarment for AC block management.   -           BADL Safety/Performance    Impairments, BADL Safety/Performance  balance;endurance/activity tolerance;pain;strength  -        Skilled BADL Treatment/Intervention  adaptive equipment training;BADL process/adaptation training  -           General ROM    GENERAL ROM COMMENTS  WFL  -           MMT (Manual Muscle Testing)    General MMT Comments  WFL   -LC           Positioning and Restraints    Pre-Treatment Position  other (comment)  -LC        Post Treatment Position  chair EOB  -LC        In Chair  sitting;call light within reach;encouraged to call for assist;exit alarm on;with family/caregiver;reclined;notified ns  -           Pain Assessment    Additional Documentation  Pain Scale: Numbers Pre/Post-Treatment (Group)  -LC           Pain Scale: Numbers Pre/Post-Treatment    Pain Scale: Numbers, Pretreatment  3/10  -LC        Pain Scale: Numbers, Post-Treatment  3/10  -LC        Pain Location - Side  Right  -LC        Pain Location - Orientation  generalized  -LC        Pain Location  knee  -LC        Pre/Post Treatment Pain Comment  Nsg administered pain meds  -        Pain Intervention(s)  Cold applied;Repositioned;Ambulation/increased activity  -           Wound 08/20/19 1138 Right knee Incision    Wound - Properties Group Date first assessed: 08/20/19  - Time first assessed: 1138  -JA Side: Right  -JA Location: knee  -JA Primary Wound Type: Incision  -JA       Clinical Impression (OT)    Date of Referral to OT  08/21/19  -        OT Diagnosis  impaired ADLs  -        Patient/Family Goals Statement (OT Eval)  To return home  -        Therapy Frequency (OT Eval)  daily  -        Care Plan Review (OT)  evaluation/treatment results reviewed;care plan/treatment goals reviewed;risks/benefits reviewed;current/potential barriers reviewed;patient/other agree to care plan  -        Anticipated Discharge Disposition (OT)  inpatient rehabilitation facility  -           Planned OT Interventions    Planned Therapy Interventions (OT Eval)  activity tolerance training;adaptive equipment training;BADL retraining  -           OT Goals    Transfer Goal Selection (OT)  transfer, OT goal 1  -LC        Dressing Goal Selection (OT)  dressing, OT goal 1  -LC        Toileting Goal Selection (OT)  toileting, OT goal 1  -LC           Transfer Goal 1 (OT)     Activity/Assistive Device (Transfer Goal 1, OT)  bed-to-chair/chair-to-bed;toilet  -LC        [place] Level/Cues Needed (Transfer Goal 1, OT)  supervision required;verbal cues required  -LC        Time Frame (Transfer Goal 1, OT)  5 days  -LC        Progress/Outcome (Transfer Goal 1, OT)  goal ongoing  -           Dressing Goal 1 (OT)    Activity/Assistive Device (Dressing Goal 1, OT)  lower body dressing  -LC        [place]/Cues Needed (Dressing Goal 1, OT)  standby assist;verbal cues required  -LC        Time Frame (Dressing Goal 1, OT)  5 days  -LC        Progress/Outcome (Dressing Goal 1, OT)  goal ongoing  -           Toileting Goal 1 (OT)    Activity/Device (Toileting Goal 1, OT)  grab bar/safety frame;raised toilet seat  -        [place] Level/Cues Needed (Toileting Goal 1, OT)  standby assist;verbal cues required  -        Time Frame (Toileting Goal 1, OT)  5 days  -LC        Progress/Outcome (Toileting Goal 1, OT)  goal ongoing  -          User Key  (r) = Recorded By, (t) = Taken By, (c) = Cosigned By    Initials Name Effective Dates    Jorge Christie RN 06/16/16 -     LC Danisha Cardozo OT 07/18/19 -          Occupational Therapy Education     Title: PT OT SLP Therapies (In Progress)     Topic: Occupational Therapy (In Progress)     Point: ADL training (Done)     Description: Instruct learner(s) on proper safety adaptation and remediation techniques during self care or transfers.   Instruct in proper use of assistive devices.    Learning Progress Summary           Patient Acceptance, E VU by JESSICA at 8/21/2019  8:07 AM   Family RACHELL Selas VU by JESSICA at 8/21/2019  8:07 AM                   Point: Precautions (Done)     Description: Instruct learner(s) on prescribed precautions during self-care and functional transfers.    Learning Progress Summary           Patient Acceptance E VU by JESSICA at 8/21/2019  8:07 AM   Family Acceptance E VU by JESSICA at 8/21/2019  8:07 AM                    Point: Body mechanics (Done)     Description: Instruct learner(s) on proper positioning and spine alignment during self-care, functional mobility activities and/or exercises.    Learning Progress Summary           Patient Acceptance, E, VU by  at 8/21/2019  8:07 AM   Family Acceptance, E, VU by  at 8/21/2019  8:07 AM                               User Key     Initials Effective Dates Name Provider Type [facility] 07/18/19 -  Danisha Cardozo OT Occupational Therapist OT                  OT Recommendation and Plan  Outcome Summary/Treatment Plan (OT)  Anticipated Discharge Disposition (OT): inpatient rehabilitation facility  Planned Therapy Interventions (OT Eval): activity tolerance training, adaptive equipment training, BADL retraining  Therapy Frequency (OT Eval): daily  Plan of Care Review  Plan of Care Reviewed With: patient, daughter  Plan of Care Reviewed With: patient, daughter  Outcome Summary: Pt. required vc'ing for safety and sequencing and CGA for txfs/mobility. Pt. completed toileting tasks w/ CGA and vc'ing to manage AC nerve catheter. Pt. demo'd LBD using AE and min A. Recommend IPR at discharge.     Outcome Measures     Row Name 08/21/19 0807             How much help from another is currently needed...    Putting on and taking off regular lower body clothing?  3  -LC      Bathing (including washing, rinsing, and drying)  3  -LC      Toileting (which includes using toilet bed pan or urinal)  3  -LC      Putting on and taking off regular upper body clothing  3  -LC      Taking care of personal grooming (such as brushing teeth)  4  -LC      Eating meals  4  -      AM-PAC 6 Clicks Score (OT)  20  -         Functional Assessment    Outcome Measure Options  AM-PAC 6 Clicks Daily Activity (OT)  -        User Key  (r) = Recorded By, (t) = Taken By, (c) = Cosigned By    Initials Name Provider Type     Danisha Cardozo OT Occupational Therapist          Time Calculation:   Time  Calculation- OT     Row Name 08/21/19 0807             Time Calculation- OT    OT Start Time  0807  -      OT Received On  08/21/19  -      OT Goal Re-Cert Due Date  08/31/19  -         Timed Charges    30816 - OT Self Care/Mgmt Minutes  25  -        User Key  (r) = Recorded By, (t) = Taken By, (c) = Cosigned By    Initials Name Provider Type     Danisha Cardozo OT Occupational Therapist        Therapy Charges for Today     Code Description Service Date Service Provider Modifiers Qty    28260264991  OT SELF CARE/MGMT/TRAIN EA 15 MIN 8/21/2019 Danisha Cardozo OT  2    [id number]  OT EVAL MOD COMPLEXITY 3 8/21/2019 Danisha Cardozo OT  1               Danisha Cardozo OT  8/21/2019

## 2019-08-21 NOTE — PLAN OF CARE
Problem: Patient Care Overview  Goal: Plan of Care Review  Outcome: Ongoing (interventions implemented as appropriate)   08/21/19 0807   Coping/Psychosocial   Plan of Care Reviewed With patient;daughter   OTHER   Outcome Summary Pt. required vc'ing for safety and sequencing and CGA for txfs/mobility. Pt. completed toileting tasks w/ CGA and vc'ing to manage AC nerve catheter. Pt. demo'd LBD using AE and min A. Recommend IPR at discharge.

## 2019-08-21 NOTE — PROGRESS NOTES
"IM progress note      Jewels Venegas  [id number]  1940     LOS: 1 day     Attending: Faustino Weinstein MD    Primary Care Provider: Renée Multani MD      Chief Complaint/Reason for visit:  Right knee pain    Subjective   Doing well. Good pain control. Denies f/c/n/v/sob/cp.  ( good progress. Great attitude)wy  Objective     Vital Signs  Visit Vitals  /47 (BP Location: Left arm, Patient Position: Sitting)   Pulse 78   Temp 98.5 °F (36.9 °C) (Oral)   Resp 16   Ht 168.9 cm (66.5\")   Wt 88.5 kg (194 lb 16 oz)   SpO2 97%   Breastfeeding? No   BMI 31.01 kg/m²     Temp (24hrs), Av.2 °F (36.8 °C), Min:97.3 °F (36.3 °C), Max:98.7 °F (37.1 °C)      Nutrition: PO    Respiratory: Ra    Physical Therapy: Pt ambulated 250 feet with RW and CGA with cues for R knee ROM and heel strike.  Transfer CGA.  HEP reviewed and completed.  Adductor nerve cath intact.  Good SLR RLE supine.  Recommend DC to inpatient rehab     Physical Exam:     General Appearance:    Alert, cooperative, in no acute distress   Head:    Normocephalic, without obvious abnormality, atraumatic    Lungs:     Normal effort, symmetric chest rise, no crepitus, clear to      auscultation bilaterally             Heart:    Regular rhythm and normal rate, normal S1 and S2   Abdomen:     Normal bowel sounds, no masses, no organomegaly, soft        non-tender, non-distended, no guarding, no rebound                tenderness   Extremities:   No clubbing, cyanosis or edema.  No deformities. Right knee ACE wrap CDI ( ACE removed earlier by . Sleeve on CDI)wy. Nerve block present   Pulses:   Pulses palpable and equal bilaterally   Skin:   No bleeding, bruising or rash   Neurologic:   Moves all extremities with no obvious focal motor deficit.  Cranial nerves 2 - 12 grossly intact. Flexion and dorsiflexion intact bilateral feet.       Results Review:     I reviewed the patient's new clinical results.   Results from last 7 days   Lab Units " 08/21/19  0527   WBC 10*3/mm3 11.47*   HEMOGLOBIN g/dL 9.6*   HEMATOCRIT % 29.1*   PLATELETS 10*3/mm3 233     Results for MADHAVI ROBLERO (MRN [id number]) as of 8/21/2019 14:29   Ref. Range 8/6/2019 15:12   Hemoglobin Latest Ref Range: 12.0 - 15.9 g/dL 13.5   Hematocrit Latest Ref Range: 34.0 - 46.6 % 41.7     Results from last 7 days   Lab Units 08/21/19  0527 08/20/19  0814   SODIUM mmol/L 137  --    POTASSIUM mmol/L 4.6 3.7   CHLORIDE mmol/L 102  --    CO2 mmol/L 23.0  --    BUN mg/dL 21  --    CREATININE mg/dL 0.78  --    CALCIUM mg/dL 8.3*  --    GLUCOSE mg/dL 155*  --      I reviewed the patient's new imaging including images and reports.    All medications reviewed.     amLODIPine 5 mg Oral Daily   aspirin  mg Oral Daily   [START ON 8/22/2019] atorvastatin 10 mg Oral Once per day on Mon Thu   levothyroxine 112 mcg Oral Daily   lisinopril 20 mg Oral Q24H   meloxicam 15 mg Oral Daily   sodium chloride 3 mL Intravenous Q12H       Assessment/Plan     Status post total right knee replacement    Primary osteoarthritis of right knee    HTN (hypertension)    HLD (hyperlipidemia)    Hypothyroid    Leukocytosis, mild, likely reactive    Acute postoperative pain    Acute blood loss anemia, mild, asymptomatic       Plan  1. PT/OT- WBAT RLE  2. Pain control-prns, AC nerve block  3. IS-encouraged  4. DVT proph- mechs/ASA  5. Bowel regimen  6. Monitor post-op labs  7. DC planning for [facility]. CM following    HTN, HLD  - Continue home norvasc, lisinopril and statin  - Hold HCTZ  - Monitor BP   - Holding parameters for BP meds  - Labetalol PRN for SBP>170     Hypothyroid  - Continue home synthroid      AMITA Parekh  08/21/19  2:29 PM     I have personally performed the evaluation on this patient. My history is consistent  with HPI obtained. My exam findings are listed above. I have personally reviewed and discussed the above formulated treatment plan with pt and AH. [email or web address].

## 2019-08-21 NOTE — PLAN OF CARE
Problem: Patient Care Overview  Goal: Plan of Care Review  Outcome: Ongoing (interventions implemented as appropriate)   08/21/19 1258   Coping/Psychosocial   Plan of Care Reviewed With patient;daughter   Plan of Care Review   Progress improving   OTHER   Outcome Summary Pt ambulated 175 feet with RW and CGA with cues for step length, giuseppe, and knee ROM. CGA for transfers. Pain 1-3/10. R knee AROM 0-100 degrees pt sitting in chair. Adductor nerve cath intact. HEP reviewed and packet reviewed with pt and daughter

## 2019-08-21 NOTE — PROGRESS NOTES
[facility]    Acute pain service Inpatient Progress Note    Patient Name: Jewels Venegas  :  1940  MRN:  [id number]        Acute Pain  Service Inpatient Progress Note:    Analgesia:Excellent  Pain Score:2/10  LOC: alert and awake  Side Effects:None  Catheter Site:clean, dry and dressing intact  Cath type: peripheral nerve cath with ON Q  Infusion rate: 10ml/hr  Catheter Plan:Continue catheter infusion rate unchanged and Catheter to remain Insitu

## 2019-08-21 NOTE — THERAPY TREATMENT NOTE
"Patient Name: Jewels Venegas  : 1940    MRN: [id number]                              Today's Date: 2019       Admit Date: 2019    Visit Dx:     ICD-10-CM ICD-9-CM   1. Impaired functional mobility, balance, gait, and endurance Z74.09 V49.89   2. Primary osteoarthritis of right knee M17.11 715.16   3. Impaired mobility and ADLs Z74.09 799.89     Patient Active Problem List   Diagnosis   • Post laminectomy syndrome   • S/P insertion of spinal cord stimulator   • Osteoporosis   • Lumbar stenosis with neurogenic claudication   • Polyneuropathy   • Spondylarthrosis   • Benign paroxysmal positional vertigo   • Insomnia due to medical condition   • At high risk for falls   • Physical deconditioning   • Encounter for fitting and adjustment of neuropacemaker of spinal cord   • Primary osteoarthritis of both knees   • Primary osteoarthritis of right knee   • Status post total right knee replacement   • HTN (hypertension)   • HLD (hyperlipidemia)   • Hypothyroid     Past Medical History:   Diagnosis Date   • Arthritis    • Back pain    • History of kidney stones    • Hyperlipidemia     triglycerides   • Hypertension    • Osteoporosis    • Seasonal allergies    • Thyroid disease    • TIA (transient ischemic attack)     \"Possibly\"   • Urinary frequency    • Urinary urgency    • Vertigo    • Wears dentures    • Wears glasses      Past Surgical History:   Procedure Laterality Date   • APPENDECTOMY     • CARDIAC CATHETERIZATION     • CHOLECYSTECTOMY     • COLONOSCOPY     • LUMBAR LAMINECTOMY  2015    Dr. Domenico Reid, [facility] L2-5   • SPINAL CORD STIMULATOR IMPLANT N/A 10/1/2018    Procedure: SPINAL CORD STIMULATOR INSERTION PHASE 1 ;  Surgeon: Vince Olivas MD;  Location:  [facility];  Service: Neurosurgery   • TONSILLECTOMY     • TOTAL KNEE ARTHROPLASTY Right 2019    Procedure: TOTAL KNEE ARTHROPLASTY RIGHT;  Surgeon: Faustino Weinstein MD;  Location:  [facility];  Service: " Orthopedics     General Information     Row Name 08/21/19 1359 08/21/19 1135       PT Evaluation Time/Intention    Document Type  therapy note (daily note)  -AA  therapy note (daily note)  -AA    Mode of Treatment  physical therapy;individual therapy  -AA  physical therapy;individual therapy  -AA    Row Name 08/21/19 0807          PT Evaluation Time/Intention    Subjective Information  complains of;pain  -LC     Document Type  evaluation  -LC     Mode of Treatment  individual therapy;occupational therapy  -LC     Patient Effort  good  -LC     Row Name 08/21/19 1359 08/21/19 1135       General Information    Patient Profile Reviewed?  yes  -AA  yes  -AA    Existing Precautions/Restrictions  fall;other (see comments) adductor nerve cath  -AA  fall;other (see comments) adductor nerve cath  -AA    Barriers to Rehab  previous functional deficit  -AA  --    Row Name 08/21/19 0807          General Information    Patient Profile Reviewed?  yes  -LC     Referring Physician  MD Js  -     Patient Observations  alert;cooperative;agree to therapy  -LC     Patient/Family Observations  Daughter present for session  -     General Observations of Patient  Pt. sitting EOB   -     Prior Level of Function  independent:;grooming;feeding;min assist:;all household mobility;ADL's;transfer  -     Equipment Currently Used at Home  raised toilet;walker, rolling;rollator;cane, quad;grab bar;shower chair  -     Pertinent History of Current Functional Problem  Pt. presents w/ R knee pain that failed to improve w/ conservative measures. Pt. now POD 1 s/p R TKA.    -LC     Existing Precautions/Restrictions  fall;other (see comments) Lateral Hip, Adductor Canal Nerve Catheter  -LC     Equipment Issued to Patient  leg   -     Risks Reviewed  patient:;family:;nausea/vomiting;dizziness;lines disloged;increased discomfort  -LC     Benefits Reviewed  patient and family:;improve function;increase independence;increase  strength;increase balance;decrease pain  -     Barriers to Rehab  previous functional deficit  -LC     Row Name 08/21/19 1359 08/21/19 0807       Relationship/Environment    Primary Source of Support/Comfort  --  child(darius);pet  -LC    Lives With  alone Pt resides in [place]  -  alone  -LC    Family Caregiver if Needed  child(darius), adult;sibling(s)  -  child(darius), adult  -    Family Caregiver Names  Daughter-Adilene and sister- Noelle  -  --    Row Name 08/21/19 1359 08/21/19 0807       Resource/Environmental Concerns    Current Living Arrangements  home/apartment/condo  -EMA  home/apartment/condo  -    Resource/Environmental Concerns  none  -EMA  none  -LC    Row Name 08/21/19 0807          Home Main Entrance    Number of Stairs, Main Entrance  other (see comments);none Ramp   -LC     Row Name 08/21/19 1359 08/21/19 1135       Cognitive Assessment/Intervention- PT/OT    Orientation Status (Cognition)  oriented x 4  -AA  oriented x 4  -AA    Row Name 08/21/19 0807          Cognitive Assessment/Intervention- PT/OT    Affect/Mental Status (Cognitive)  WFL  -     Orientation Status (Cognition)  oriented x 4  -LC     Follows Commands (Cognition)  follows one step commands;over 90% accuracy  -     Cognitive Function (Cognitive)  WFL  -     Row Name 08/21/19 1359 08/21/19 1135       Safety Issues, Functional Mobility    Safety Issues Affecting Function (Mobility)  awareness of need for assistance;insight into deficits/self awareness;sequencing abilities  -AA  awareness of need for assistance;insight into deficits/self awareness;sequencing abilities  -AA    Impairments Affecting Function (Mobility)  endurance/activity tolerance;pain;range of motion (ROM);strength  -AA  endurance/activity tolerance;pain;range of motion (ROM);strength  -AA      User Key  (r) = Recorded By, (t) = Taken By, (c) = Cosigned By    Initials Name Provider Type    Cecile Ricketts     Michelle Farnsworth, PT Physical  Therapist     Danisha Cardozo OT Occupational Therapist        Mobility     Row Name 08/21/19 1400 08/21/19 1138       Bed Mobility Assessment/Treatment    Bed Mobility Assessment/Treatment  supine-sit;sit-supine;scooting/bridging  -AA  --    Scooting/Bridging [place] (Bed Mobility)  conditional independence  -AA  --    Supine-Sit [place] (Bed Mobility)  verbal cues;contact guard  -AA  --    Sit-Supine [place] (Bed Mobility)  contact guard;verbal cues  -AA  --    Assistive Device (Bed Mobility)  head of bed elevated;bed rails  -AA  --    Comment (Bed Mobility)  increased time to perform  -AA  pt UI  -    Row Name 08/21/19 1400 08/21/19 1138       Transfer Assessment/Treatment    Comment (Transfers)  VC for RLE in front prior to stand/sit  -AA  VC for stepping RLE in front prior to stand/sit; VC for reaching back  -    Row Name 08/21/19 0807          Transfer Assessment/Treatment    Transfer Assessment/Treatment  sit-stand transfer;stand-sit transfer;bed-chair transfer;toilet transfer  -     Comment (Transfers)  vc'ing to push up from surface to stand and to reach back for surface to lower to slowly slower to sit. vc's to step RLE out for comfort during txf.   -     Stand-Sit [place] (Transfers)  verbal cues;minimum assist (75% patient effort)  -     [place] Level (Toilet Transfer)  minimum assist (75% patient effort);verbal cues  -     Assistive Device (Toilet Transfer)  walker, front-wheeled;grab bars/safety frame;raised toilet seat  -     Row Name 08/21/19 0807          Bed-Chair Transfer    Bed-Chair [place] (Transfers)  minimum assist (75% patient effort);verbal cues  -     Assistive Device (Bed-Chair Transfers)  walker, front-wheeled  -     Row Name 08/21/19 1400 08/21/19 1138       Sit-Stand Transfer    Sit-Stand [place] (Transfers)  verbal cues;contact guard  -  verbal cues;contact guard  -    Assistive Device (Sit-Stand Transfers)  walker,  front-wheeled  -AA  --    Row Name 08/21/19 0807          Sit-Stand Transfer    Sit-Stand [place] (Transfers)  verbal cues;contact guard  -LC     Assistive Device (Sit-Stand Transfers)  walker, front-wheeled  -LC     Row Name 08/21/19 1404 08/21/19 1400       Gait/Stairs Assessment/Training    02013 - Gait Training Minutes   16  -AA  --    [place] Level (Gait)  --  contact guard;verbal cues  -AA    Assistive Device (Gait)  --  walker, front-wheeled  -AA    Distance in Feet (Gait)  --  250  -AA    Deviations/Abnormal Patterns (Gait)  --  bilateral deviations;giuseppe decreased;gait speed decreased;stride length decreased;right sided deviations;antalgic  -AA    Bilateral Gait Deviations  --  heel strike decreased;forward flexed posture  -AA    Right Sided Gait Deviations  --  weight shift ability decreased  -AA    Comment (Gait/Stairs)  --  pt ambulated with improved step length and giuseppe.  Continued cues for R knee ROM and heel strike.    -AA    Row Name 08/21/19 1152 08/21/19 1138       Gait/Stairs Assessment/Training    40733 - Gait Training Minutes   14  -AA  --    [place] Level (Gait)  --  contact guard;verbal cues  -AA    Assistive Device (Gait)  --  walker, front-wheeled  -AA    Distance in Feet (Gait)  --  175  -AA    Deviations/Abnormal Patterns (Gait)  --  bilateral deviations;giuseppe decreased;gait speed decreased;stride length decreased;right sided deviations;antalgic  -AA    Bilateral Gait Deviations  --  heel strike decreased;forward flexed posture  -AA    Right Sided Gait Deviations  --  weight shift ability decreased  -AA    Comment (Gait/Stairs)  --   pt ambulated with improved step length and heel strike with continued slow giuseppe; VC for knee ROM during swing phase.  VC for WB during stance phase.  No increase pain with gait  -AA    Row Name 08/21/19 1400 08/21/19 1138       Mobility Assessment/Intervention    Extremity Weight-bearing Status  right lower extremity  -AA  right lower  extremity  -AA    Right Lower Extremity (Weight-bearing Status)  weight-bearing as tolerated (WBAT)  -AA  weight-bearing as tolerated (WBAT)  -AA    Row Name 08/21/19 0807          Mobility Assessment/Intervention    Extremity Weight-bearing Status  right lower extremity  -LC     Right Lower Extremity (Weight-bearing Status)  weight-bearing as tolerated (WBAT)  -LC       User Key  (r) = Recorded By, (t) = Taken By, (c) = Cosigned By    Initials Name Provider Type    AA Michelle Meyer, PT Physical Therapist    LC Danisha Cardozo, BRENT Occupational Therapist        Obj/Interventions     Row Name 08/21/19 1139 08/21/19 0807       General ROM    GENERAL ROM COMMENTS  R knee 0-100 degrees sitting AROM  -AA  WFL  -LC    Row Name 08/21/19 0807          MMT (Manual Muscle Testing)    General MMT Comments  WFL  -     Row Name 08/21/19 1401 08/21/19 1139       Therapeutic Exercise    Lower Extremity (Therapeutic Exercise)  gluteal sets;heel slides, right;LAQ (long arc quad), right;quad sets, bilateral;SLR (straight leg raise), right  -AA  gluteal sets;heel slides, right;LAQ (long arc quad), right;quad sets, bilateral;SLR (straight leg raise), right  -AA    Lower Extremity Range of Motion (Therapeutic Exercise)  ankle dorsiflexion/plantar flexion, bilateral;hip abduction/adduction, right  -AA  ankle dorsiflexion/plantar flexion, bilateral;knee flexion/extension, right  -AA    Exercise Type (Therapeutic Exercise)  AROM (active range of motion)  -AA  AROM (active range of motion)  -AA    Position (Therapeutic Exercise)  --  seated  -AA    Sets/Reps (Therapeutic Exercise)  15  -AA  10  -AA    Comment (Therapeutic Exercise)  good SLR with no knee extension lag  -AA  improved ROM this date with no pain increase  -AA    Row Name 08/21/19 1401 08/21/19 1139       Static Sitting Balance    Level of [place] (Unsupported Sitting, Static Balance)  independent  -AA  independent  -AA    Sitting Position (Unsupported Sitting, Static  Balance)  sitting on edge of bed  -AA  sitting in chair  -AA    Time Able to Maintain Position (Unsupported Sitting, Static Balance)  more than 5 minutes  -AA  more than 5 minutes  -AA    Row Name 08/21/19 1401 08/21/19 1139       Dynamic Sitting Balance    Level of [place], Reaches Outside Midline (Sitting, Dynamic Balance)  contact guard assist  -AA  contact guard assist  -AA    Sitting Position, Reaches Outside Midline (Sitting, Dynamic Balance)  sitting on edge of bed  -AA  sitting in chair  -AA    Row Name 08/21/19 1401 08/21/19 1139       Static Standing Balance    Level of [place] (Supported Standing, Static Balance)  contact guard assist  -AA  contact guard assist  -AA    Time Able to Maintain Position (Supported Standing, Static Balance)  4 to 5 minutes  -AA  4 to 5 minutes  -AA    Assistive Device Utilized (Supported Standing, Static Balance)  walker, rolling  -AA  walker, rolling  -AA    Row Name 08/21/19 1401 08/21/19 1139       Dynamic Standing Balance    Level of [place], Reaches Outside Midline (Standing, Dynamic Balance)  contact guard assist  -AA  contact guard assist  -AA    Time Able to Maintain Position, Reaches Outside Midline (Standing, Dynamic Balance)  4 to 5 minutes  -AA  4 to 5 minutes  -AA    Assistive Device Utilized (Supported Standing, Dynamic Balance)  walker, rolling  -AA  walker, rolling  -AA      User Key  (r) = Recorded By, (t) = Taken By, (c) = Cosigned By    Initials Name Provider Type    Michelle Farnsworth, PT Physical Therapist    Danisha Ernst, BRENT Occupational Therapist        Goals/Plan    No documentation.       Clinical Impression     Row Name 08/21/19 1402          Pain Assessment    Additional Documentation  Pain Scale: Numbers Pre/Post-Treatment (Group)  -AA     Row Name 08/21/19 1402 08/21/19 1145       Pain Scale: Numbers Pre/Post-Treatment    Pain Scale: Numbers, Pretreatment  3/10  -AA  1/10  -AA    Pain Scale: Numbers, Post-Treatment  4/10  -AA   3/10  -AA    Pain Location - Side  Right  -AA  Right  -AA    Pain Location - Orientation  generalized  -AA  generalized  -AA    Pain Location  knee  -AA  knee  -AA    Pre/Post Treatment Pain Comment  --  pt premedicated  -AA    Pain Intervention(s)  Cold applied;Ambulation/increased activity;Repositioned  -AA  Cold applied;Ambulation/increased activity;Repositioned  -AA    Row Name 08/21/19 0807          Pain Scale: Numbers Pre/Post-Treatment    Pain Scale: Numbers, Pretreatment  3/10  -LC     Pain Scale: Numbers, Post-Treatment  3/10  -LC     Pain Location - Side  Right  -LC     Pain Location - Orientation  generalized  -LC     Pain Location  knee  -LC     Pre/Post Treatment Pain Comment  Nsg administered pain meds  -LC     Pain Intervention(s)  Cold applied;Repositioned;Ambulation/increased activity  -LC     Row Name 08/21/19 1403 08/21/19 1402       Plan of Care Review    Plan of Care Reviewed With  patient;daughter  -AA  patient;daughter  -AA    Row Name 08/21/19 1148 08/21/19 1145       Plan of Care Review    Plan of Care Reviewed With  patient;daughter  -AA  patient;daughter  -AA    Row Name 08/21/19 0826 08/21/19 0807       Plan of Care Review    Plan of Care Reviewed With  patient;family  -TB (r) NB (t) TB (c)  patient;daughter  -LC    Row Name 08/21/19 0345          Plan of Care Review    Plan of Care Reviewed With  patient  -JF     Row Name 08/21/19 1402 08/21/19 1145       Positioning and Restraints    Pre-Treatment Position  in bed  -AA  sitting in chair/recliner  -AA    Post Treatment Position  bed  -AA  chair  -AA    In Bed  notified nsg;supine;call light within reach;encouraged to call for assist;exit alarm on;with family/caregiver  -AA  --    In Chair  --  notified nsg;exit alarm on;reclined;with family/caregiver;call light within reach;encouraged to call for assist  -AA    Row Name 08/21/19 0807          Positioning and Restraints    Pre-Treatment Position  other (comment)  -LC     Post Treatment  Position  chair EOB  -LC     In Chair  sitting;call light within reach;encouraged to call for assist;exit alarm on;with family/caregiver;reclined;notified nsg  -LC       User Key  (r) = Recorded By, (t) = Taken By, (c) = Cosigned By    Initials Name Provider Type     Cl Lopez, RN Registered Nurse    Mick Encarnacion RN Registered Nurse    Michelle Farnsworth, PT Physical Therapist    Danisha Ernst, OT Occupational Therapist    Renée Amador RN Registered Nurse        Outcome Measures     Row Name 08/21/19 1404 08/21/19 1147       How much help from another person do you currently need...    Turning from your back to your side while in flat bed without using bedrails?  4  -AA  3  -AA    Moving from lying on back to sitting on the side of a flat bed without bedrails?  3  -AA  3  -AA    Moving to and from a bed to a chair (including a wheelchair)?  3  -AA  3  -AA    Standing up from a chair using your arms (e.g., wheelchair, bedside chair)?  3  -AA  3  -AA    Climbing 3-5 steps with a railing?  2  -AA  2  -AA    To walk in hospital room?  3  -AA  3  -AA    AM-PAC 6 Clicks Score (PT)  18  -AA  17  -AA    Row Name 08/21/19 1404 08/21/19 1147       Functional Assessment    Outcome Measure Options  AM-PAC 6 Clicks Basic Mobility (PT)  -AA  AM-PAC 6 Clicks Basic Mobility (PT)  -AA    Row Name 08/21/19 0807          Functional Assessment    Outcome Measure Options  AM-PAC 6 Clicks Daily Activity (OT)  -       User Key  (r) = Recorded By, (t) = Taken By, (c) = Cosigned By    Initials Name Provider Type    Michelle Farnsworth, PT Physical Therapist    Danisha Ernst, OT Occupational Therapist        Physical Therapy Education     Title: PT OT SLP Therapies (In Progress)     Topic: Physical Therapy (Done)     Point: Mobility training (Done)     Learning Progress Summary           Patient RACHELL Bravo D,H, VU,NR by RUCHI at 8/21/2019  2:03 PM    RACHELL Bravo D,GEETHA, VIOLETTE,NR by RUCHI at 8/21/2019 11:47 AM    Acceptance,  E,D, VU,NR by LR at 8/20/2019  4:19 PM    Comment:  Educated on precautions, weight bearing status, correct supine to sit t/f technique, correct sit<->stand t/f technique, correct gait mechanics, and progression of POC.   Family Eager, E,D,H, VU,NR by AA at 8/21/2019  2:03 PM    Eager, E,D,H, VU,NR by AA at 8/21/2019 11:47 AM    Acceptance, E,D, VU,NR by LR at 8/20/2019  4:19 PM    Comment:  Educated on precautions, weight bearing status, correct supine to sit t/f technique, correct sit<->stand t/f technique, correct gait mechanics, and progression of POC.                   Point: Home exercise program (Done)     Learning Progress Summary           Patient Eager, E,D,H, VU,NR by AA at 8/21/2019  2:03 PM    Eager, E,D,H, VU,NR by AA at 8/21/2019 11:47 AM    Acceptance, E,D, VU,NR by LR at 8/20/2019  4:19 PM    Comment:  Educated on precautions, weight bearing status, correct supine to sit t/f technique, correct sit<->stand t/f technique, correct gait mechanics, and progression of POC.   Family Eager, E,D,H, VU,NR by AA at 8/21/2019  2:03 PM    Eager, E,D,H, VU,NR by AA at 8/21/2019 11:47 AM    Acceptance, E,D, VU,NR by LR at 8/20/2019  4:19 PM    Comment:  Educated on precautions, weight bearing status, correct supine to sit t/f technique, correct sit<->stand t/f technique, correct gait mechanics, and progression of POC.                   Point: Body mechanics (Done)     Learning Progress Summary           Patient Eager, E,D,H, VU,NR by AA at 8/21/2019  2:03 PM    Eager, E,D,H, VU,NR by AA at 8/21/2019 11:47 AM    Acceptance, E,D, VU,NR by LR at 8/20/2019  4:19 PM    Comment:  Educated on precautions, weight bearing status, correct supine to sit t/f technique, correct sit<->stand t/f technique, correct gait mechanics, and progression of POC.   Family RACHELL Bravo D, H, VU,NR by AA at 8/21/2019  2:03 PM    RACHELL Bravo D, H, VU,NR by AA at 8/21/2019 11:47 AM    RACHELL Seals D, VU, NR by ANIYA at 8/20/2019  4:19 PM    Comment:   Educated on precautions, weight bearing status, correct supine to sit t/f technique, correct sit<->stand t/f technique, correct gait mechanics, and progression of POC.                   Point: Precautions (Done)     Learning Progress Summary           Patient Eager, E,D,H, VU,NR by AA at 8/21/2019  2:03 PM    Eager, E,D,H, VU,NR by AA at 8/21/2019 11:47 AM    Acceptance, E,D, VU,NR by LR at 8/20/2019  4:19 PM    Comment:  Educated on precautions, weight bearing status, correct supine to sit t/f technique, correct sit<->stand t/f technique, correct gait mechanics, and progression of POC.   Family Eager, E,D,H, VU,NR by AA at 8/21/2019  2:03 PM    Eager, E,D,H, VU,NR by AA at 8/21/2019 11:47 AM    Acceptance, E,D, VU,NR by LR at 8/20/2019  4:19 PM    Comment:  Educated on precautions, weight bearing status, correct supine to sit t/f technique, correct sit<->stand t/f technique, correct gait mechanics, and progression of POC.                               User Key     Initials Effective Dates Name Provider Type Discipline     06/19/15 -  Danisha Navarro, PT Physical Therapist PT     04/02/18 -  Michelle Meyer, PT Physical Therapist PT              PT Recommendation and Plan     Outcome Summary/Treatment Plan (PT)  Anticipated Discharge Disposition (PT): inpatient rehabilitation facility  Plan of Care Reviewed With: patient, daughter  Progress: improving  Outcome Summary: Pt ambulated 250 feet with RW and CGA with cues for R knee ROM and heel strike.  Transfer CGA.  HEP reviewed and completed.  Adductor nerve cath intact.  Good SLR RLE supine.  Recommend DC to inpatient rehab      Time Calculation:   PT Charges     Row Name 08/21/19 1404 08/21/19 1152          Time Calculation    Start Time  1320  -AA  0918  -AA     PT Received On  08/21/19  -AA  08/21/19  -AA     PT Goal Re-Cert Due Date  08/30/19  -AA  08/30/19  -AA        Time Calculation- PT    Total Timed Code Minutes- PT  24 minute(s)  -AA  29 minute(s)   -AA        Timed Charges    77973 - PT Therapeutic Exercise Minutes  8  -AA  15  -AA     40910 - Gait Training Minutes   16  -AA  14  -AA       User Key  (r) = Recorded By, (t) = Taken By, (c) = Cosigned By    Initials Name Provider Type    Michelle Farnsworth, PT Physical Therapist        Therapy Charges for Today     Code Description Service Date Service Provider Modifiers Qty    69967006373 HC PT THER PROC EA 15 MIN 8/21/2019 Michelle Meyer, PT GP 1    15608732137 HC GAIT TRAINING EA 15 MIN 8/21/2019 Michelle Meyer, PT GP 1    66496555337 HC GAIT TRAINING EA 15 MIN 8/21/2019 Michelle Meyer, PT  1    95033521105 HC PT THER PROC EA 15 MIN 8/21/2019 Michelle Meyer, PT  1          PT G-Codes  Outcome Measure Options: AM-PAC 6 Clicks Basic Mobility (PT)  AM-PAC 6 Clicks Score (PT): 18  AM-PAC 6 Clicks Score (OT): 20    April ALVINO Meyer PT  8/21/2019

## 2019-08-21 NOTE — PROGRESS NOTES
Discharge Planning Assessment  [facility]     Patient Name: Jewels Venegas  MRN: [id number]  Today's Date: 8/21/2019    Admit Date: 8/20/2019    Discharge Needs Assessment     Row Name 08/21/19 5369       Living Environment    Lives With  alone Pt resides in [place]    Current Living Arrangements  home/apartment/condo    Primary Care Provided by  self    Provides Primary Care For  no one    Family Caregiver if Needed  child(darius), adult;sibling(s)    Family Caregiver Names  Daughter-Adilene and sister- Noelle    Quality of Family Relationships  helpful;supportive    Able to Return to Prior Arrangements  no       Resource/Environmental Concerns    Resource/Environmental Concerns  none       Transition Planning    Patient/Family Anticipates Transition to  inpatient rehabilitation facility    Patient/Family Anticipated Services at Transition  skilled nursing    Transportation Anticipated  family or friend will provide       Discharge Needs Assessment    Readmission Within the Last 30 Days  no previous admission in last 30 days    Concerns to be Addressed  discharge planning    Equipment Currently Used at Home  walker, rolling;rollator;cane, straight;shower chair    Anticipated Changes Related to Illness  inability to care for self    Equipment Needed After Discharge  none    Outpatient/Agency/Support Group Needs  inpatient rehabilitation facility    Discharge Facility/Level of Care Needs  acute rehab    Offered/Gave Vendor List  yes    Patient's Choice of Community Agency(s)  [facility]         Discharge Plan     Row Name 08/21/19 0816       Plan    Plan  [facility]    Patient/Family in Agreement with Plan  yes    Plan Comments  CM spoke with pt at bedside and she resides alone in [place]. Pt is independent with use of DME. Pt has a walker, rollator, cane, comfort height toilets, and shower chair. Pt is not current with home health or outpt services at this time.  Pt has no one that can assist her 24/7  after surgery but family could check in on her occasionally and bring groceries when needed however pt feels her best option is inpt rehab. CM discussed options with pt and she would like referral made to [facility]. CM made a pre-referral on pt when pre-op phone call was completed and [facility] at [facility] will initiate insurance auth today. CM will conitnue to follow.     Final Discharge Disposition Code  62 - inpatient rehab facility        Destination      No service coordination in this encounter.      Durable Medical Equipment      No service coordination in this encounter.      Dialysis/Infusion      No service coordination in this encounter.      Home Medical Care      No service coordination in this encounter.      Therapy      No service coordination in this encounter.      Community Resources      No service coordination in this encounter.          Demographic Summary     Row Name 08/21/19 1320       General Information    Admission Type  inpatient    Referral Source  admission list    Reason for Consult  discharge planning    Preferred Language  English    General Information Comments  PCP- Renée Multani       Contact Information    Permission Granted to Share Info With      Contact Information Comments  [phone]        Functional Status     Row Name 08/21/19 1321       Functional Status    Usual Activity Tolerance  moderate    Current Activity Tolerance  fair       Functional Status, IADL    Medications  independent    Meal Preparation  assistive equipment    Housekeeping  assistive equipment    Laundry  assistive equipment    Shopping  assistive equipment       Employment/    Employment/ Comments  Pt confirms she has [employer] Medicare and denies concerns or disruption in coverage. Pt has prescription drug coverage and denies issues obtaining or affording current medications.         Psychosocial    No documentation.       Abuse/Neglect    No documentation.       Legal    No  documentation.       Substance Abuse    No documentation.       Patient Forms    No documentation.           Cecile Rodriguez

## 2019-08-21 NOTE — PROGRESS NOTES
"      [facility] Orthopaedic Surgery Progress Note    Subjective      LOS: 1 day   Patient Care Team:  Renée Multani MD as PCP - General  Renée Multani MD as PCP - Family Medicine  Apro, Juju Pryor PA-C as Physician Assistant (Physician Assistant)  Doron Gale MD as Consulting Physician (Pain Medicine)  Domenico Reid MD as Consulting Physician (Neurosurgery)    Chief complaint: Right knee pain       Interval History:   Patient sitting up at the bedside eating breakfast.  Pain is controlled.    Objective      Vital Signs  Temp (24hrs), Av.9 °F (36.6 °C), Min:97.3 °F (36.3 °C), Max:99.9 °F (37.7 °C)      /68   Pulse 74   Temp 98.1 °F (36.7 °C) (Oral)   Resp 16   Ht 168.9 cm (66.5\")   Wt 88.5 kg (194 lb 16 oz)   SpO2 97%   Breastfeeding? No   BMI 31.01 kg/m²     Examination:   Examination of the right knee: The wound is clean, dry, and intact.  Ankle dorsiflexion, ankle plantar flexion, and EHL are intact.  Sensation intact in the foot to light touch.  2+ dorsalis pedis pulse.  Straight leg raise is intact.      Labs:  Results from last 7 days   Lab Units 19  0527   WBC 10*3/mm3 11.47*   HEMOGLOBIN g/dL 9.6*   HEMATOCRIT % 29.1*   MCV fL 97.3*   PLATELETS 10*3/mm3 233       Radiology:  Imaging Results (last 24 hours)     Procedure Component Value Units Date/Time    XR Knee 1 or 2 View Right [[id number]] Collected:  19 1506     Updated:  19 1854    Narrative:       EXAMINATION: XR KNEE 1 OR 2 VW RIGHT- 2019      INDICATION: post-op      COMPARISON: 2019     FINDINGS: Status post right total knee arthroplasty without evidence of  complication in excellent anatomic alignment. Expected postsurgical  changes in the adjacent soft tissues including soft tissue emphysema.             Impression:       Status post right total knee arthroplasty without evidence  of complication in excellent anatomic alignment.     D:  2019  E:  2019     This report was " finalized on 8/20/2019 6:51 PM by Dr. Ezio Hurt.             PT:  Note from yesterday: Patient ambulated 40 feet with RW and step to gait pattern, limited by pain. ROM to be initiated POD#1. Plan is d/c to [facility]. Will continue to progress as able.      Results Review:     I reviewed the patient's new clinical results.    Assessment and Plan     Assessment:   Status post right total knee arthroplasty-doing well      Status post total right knee replacement    Primary osteoarthritis of right knee    HTN (hypertension)    HLD (hyperlipidemia)    Hypothyroid      Plan for disposition: Plan for discharge to rehab facility when bed available.  Follow-up with me in 3 weeks as planned.     Provider Department Center   9/11/2019 10:50 AM Faustino Weinstein MD [facility]    Appt Notes: 3 WEEK POSTOP           Faustino Weinstein MD  08/21/19  8:43 AM

## 2019-08-21 NOTE — PLAN OF CARE
Problem: Knee Arthroplasty (Total, Partial) (Adult)  Goal: Signs and Symptoms of Listed Potential Problems Will be Absent, Minimized or Managed (Knee Arthroplasty)  Outcome: Ongoing (interventions implemented as appropriate)   08/21/19 0807   Goal/Outcome Evaluation   Problems Assessed (Knee Arthroplasty) functional deficit   Problems Present (Knee Arthroplasty) functional deficit

## 2019-08-22 LAB
DEPRECATED RDW RBC AUTO: 50.5 FL (ref 37–54)
ERYTHROCYTE [DISTWIDTH] IN BLOOD BY AUTOMATED COUNT: 14 % (ref 12.3–15.4)
HCT VFR BLD AUTO: 29.5 % (ref 34–46.6)
HGB BLD-MCNC: 9.3 G/DL (ref 12–15.9)
MCH RBC QN AUTO: 31.1 PG (ref 26.6–33)
MCHC RBC AUTO-ENTMCNC: 31.5 G/DL (ref 31.5–35.7)
MCV RBC AUTO: 98.7 FL (ref 79–97)
PLATELET # BLD AUTO: 235 10*3/MM3 (ref 140–450)
PMV BLD AUTO: 9.7 FL (ref 6–12)
RBC # BLD AUTO: 2.99 10*6/MM3 (ref 3.77–5.28)
WBC NRBC COR # BLD: 9.86 10*3/MM3 (ref 3.4–10.8)

## 2019-08-22 PROCEDURE — 97535 SELF CARE MNGMENT TRAINING: CPT

## 2019-08-22 PROCEDURE — 97530 THERAPEUTIC ACTIVITIES: CPT

## 2019-08-22 PROCEDURE — 85027 COMPLETE CBC AUTOMATED: CPT | Performed by: ORTHOPAEDIC SURGERY

## 2019-08-22 PROCEDURE — 97110 THERAPEUTIC EXERCISES: CPT

## 2019-08-22 PROCEDURE — 97116 GAIT TRAINING THERAPY: CPT

## 2019-08-22 PROCEDURE — 99024 POSTOP FOLLOW-UP VISIT: CPT | Performed by: ORTHOPAEDIC SURGERY

## 2019-08-22 RX ADMIN — METHOCARBAMOL 750 MG: 750 TABLET, FILM COATED ORAL at 20:49

## 2019-08-22 RX ADMIN — LISINOPRIL 20 MG: 20 TABLET ORAL at 08:13

## 2019-08-22 RX ADMIN — AMLODIPINE BESYLATE 5 MG: 5 TABLET ORAL at 08:13

## 2019-08-22 RX ADMIN — MELOXICAM 15 MG: 7.5 TABLET ORAL at 08:13

## 2019-08-22 RX ADMIN — ATORVASTATIN CALCIUM 10 MG: 10 TABLET, FILM COATED ORAL at 08:13

## 2019-08-22 RX ADMIN — HYDROCODONE BITARTRATE AND ACETAMINOPHEN 1 TABLET: 5; 325 TABLET ORAL at 02:49

## 2019-08-22 RX ADMIN — SODIUM CHLORIDE, PRESERVATIVE FREE 3 ML: 5 INJECTION INTRAVENOUS at 20:49

## 2019-08-22 RX ADMIN — ASPIRIN 325 MG: 325 TABLET, DELAYED RELEASE ORAL at 08:13

## 2019-08-22 RX ADMIN — LEVOTHYROXINE SODIUM 112 MCG: 112 TABLET ORAL at 08:13

## 2019-08-22 RX ADMIN — HYDROCODONE BITARTRATE AND ACETAMINOPHEN 1 TABLET: 5; 325 TABLET ORAL at 08:13

## 2019-08-22 RX ADMIN — HYDROCODONE BITARTRATE AND ACETAMINOPHEN 1 TABLET: 5; 325 TABLET ORAL at 19:40

## 2019-08-22 RX ADMIN — SODIUM CHLORIDE, PRESERVATIVE FREE 3 ML: 5 INJECTION INTRAVENOUS at 08:20

## 2019-08-22 RX ADMIN — HYDROCODONE BITARTRATE AND ACETAMINOPHEN 1 TABLET: 5; 325 TABLET ORAL at 13:50

## 2019-08-22 NOTE — PLAN OF CARE
Problem: Patient Care Overview  Goal: Plan of Care Review  Outcome: Ongoing (interventions implemented as appropriate)   08/22/19 8411   Coping/Psychosocial   Plan of Care Reviewed With patient   Plan of Care Review   Progress improving   OTHER   Outcome Summary Pt continues to do well. Pt remains A&Ox4. Daughter at bedside participating in care. Pt VSS throughout shift. Pt continues to ambulate to restroom, and continues to void spontaneously. Pt pain has been well controlled w PO norco. Pt arrow remains at 10mL/hr, and has not needed to be increased during shift. Pt dressing remains c/d/i, and compresso  remains in place. Pt awaiting precert for rehab. Will continue to monitor.        Problem: Fall Risk (Adult)  Goal: Identify Related Risk Factors and Signs and Symptoms  Outcome: Ongoing (interventions implemented as appropriate)    Goal: Absence of Fall  Outcome: Ongoing (interventions implemented as appropriate)      Problem: Knee Arthroplasty (Total, Partial) (Adult)  Goal: Signs and Symptoms of Listed Potential Problems Will be Absent, Minimized or Managed (Knee Arthroplasty)  Outcome: Ongoing (interventions implemented as appropriate)

## 2019-08-22 NOTE — PROGRESS NOTES
[facility]    Acute pain service Inpatient Progress Note    Patient Name: Jewels Venegas  :  1940  MRN:  [id number]        Acute Pain  Service Inpatient Progress Note:    Analgesia:Excellent  Pain Score:3/10  LOC: alert and awake  Resp Status: room air  Cardiac: VS stable  Side Effects:None  Catheter Site:clean, dry and dressing intact  Cath type: peripheral nerve cath with ON Q  Infusion rate: 10ml/hr  Catheter Plan:Catheter to remain Insitu and Continue catheter infusion rate unchanged

## 2019-08-22 NOTE — PLAN OF CARE
Problem: Patient Care Overview  Goal: Plan of Care Review  Outcome: Ongoing (interventions implemented as appropriate)   08/22/19 1103   Coping/Psychosocial   Plan of Care Reviewed With patient;daughter   Plan of Care Review   Progress improving   OTHER   Outcome Summary Pt reports being sore this date and tired. Pt ambulated with RW and  feet and 40 feet with cues for proper stepping and R knee ROM. CGA for transfers with RW. Awaiting inpatient rehab placement

## 2019-08-22 NOTE — PROGRESS NOTES
"IM progress note      Jewels Venegas  [id number]  1940     LOS: 2 days     Attending: Faustino Weinstein MD    Primary Care Provider: Renée Multani MD      Chief Complaint/Reason for visit:  Right knee pain    Subjective   Feels good. Pain well controlled. Denies f/c/n/v/sob/cp.  ( good progress with rehab. )wy  Objective     Vital Signs  Visit Vitals  /69 (BP Location: Left arm, Patient Position: Sitting)   Pulse 87   Temp 98.7 °F (37.1 °C) (Oral)   Resp 16   Ht 168.9 cm (66.5\")   Wt 88.5 kg (194 lb 16 oz)   SpO2 96%   Breastfeeding? No   BMI 31.01 kg/m²     Temp (24hrs), Av.4 °F (36.9 °C), Min:98.1 °F (36.7 °C), Max:98.7 °F (37.1 °C)      Nutrition: PO    Respiratory: Ra    Physical Therapy: Pt ambulated 250 feet with RW and CGA with cues for giuseppe, step length, and heel strike.  Good sequencing during turns.  CGA for transfers.  Increased time for mobility due to weakness and safety.  Bed mobility SPV.  Good SLR RLE.  Pt pending rehab placement    Physical Exam:     General Appearance:    Alert, cooperative, in no acute distress   Head:    Normocephalic, without obvious abnormality, atraumatic    Lungs:     Normal effort, symmetric chest rise, no crepitus, clear to      auscultation bilaterally             Heart:    Regular rhythm and normal rate, normal S1 and S2   Abdomen:     Normal bowel sounds, no masses, no organomegaly, soft        non-tender, non-distended, no guarding, no rebound                tenderness   Extremities:   No clubbing, cyanosis or edema.  No deformities. Right knee stockinet CDI. Nerve block present   Pulses:   Pulses palpable and equal bilaterally   Skin:   No bleeding, bruising or rash   Neurologic:   Moves all extremities with no obvious focal motor deficit.  Cranial nerves 2 - 12 grossly intact. Flexion and dorsiflexion intact bilateral feet.       Results Review:     I reviewed the patient's new clinical results.   Results from last 7 days   Lab Units " 08/22/19  0427 08/21/19  0527   WBC 10*3/mm3 9.86 11.47*   HEMOGLOBIN g/dL 9.3* 9.6*   HEMATOCRIT % 29.5* 29.1*   PLATELETS 10*3/mm3 235 233     Results for MADHAVI ROBLERO (MRN [id number]) as of 8/21/2019 14:29   Ref. Range 8/6/2019 15:12   Hemoglobin Latest Ref Range: 12.0 - 15.9 g/dL 13.5   Hematocrit Latest Ref Range: 34.0 - 46.6 % 41.7     Results from last 7 days   Lab Units 08/21/19  0527 08/20/19  0814   SODIUM mmol/L 137  --    POTASSIUM mmol/L 4.6 3.7   CHLORIDE mmol/L 102  --    CO2 mmol/L 23.0  --    BUN mg/dL 21  --    CREATININE mg/dL 0.78  --    CALCIUM mg/dL 8.3*  --    GLUCOSE mg/dL 155*  --      I reviewed the patient's new imaging including images and reports.    All medications reviewed.     amLODIPine 5 mg Oral Daily   aspirin  mg Oral Daily   atorvastatin 10 mg Oral Once per day on Mon Thu   levothyroxine 112 mcg Oral Daily   lisinopril 20 mg Oral Q24H   meloxicam 15 mg Oral Daily   sodium chloride 3 mL Intravenous Q12H       Assessment/Plan     Status post total right knee replacement    Primary osteoarthritis of right knee    HTN (hypertension)    HLD (hyperlipidemia)    Hypothyroid    Leukocytosis, mild, likely reactive    Acute postoperative pain    Acute blood loss anemia, mild, asymptomatic       Plan  1. PT/OT- WBAT RLE  2. Pain control-prns, AC nerve block  3. IS-encouraged  4. DVT proph- mechs/ASA  5. Bowel regimen  6. Monitor post-op labs  7. DC planning for [facility]. CM following    HTN, HLD  - Continue home norvasc, lisinopril and statin  - Hold HCTZ  - Monitor BP   - Holding parameters for BP meds  - Labetalol PRN for SBP>170     Hypothyroid  - Continue home synthroid      AMITA Parekh  08/22/19  5:40 PM   I have personally performed the evaluation on this patient. My history is consistent  with HPI obtained. My exam findings are listed above. I have personally reviewed and discussed the above formulated treatment plan with pt, and DACIA. AMITA. Discussed with case  management.wy.

## 2019-08-22 NOTE — THERAPY TREATMENT NOTE
"Patient Name: Jewels Venegas  : 1940    MRN: [id number]                              Today's Date: 2019       Admit Date: 2019    Visit Dx:     ICD-10-CM ICD-9-CM   1. Impaired functional mobility, balance, gait, and endurance Z74.09 V49.89   2. Primary osteoarthritis of right knee M17.11 715.16   3. Impaired mobility and ADLs Z74.09 799.89     Patient Active Problem List   Diagnosis   • Post laminectomy syndrome   • S/P insertion of spinal cord stimulator   • Osteoporosis   • Lumbar stenosis with neurogenic claudication   • Polyneuropathy   • Spondylarthrosis   • Benign paroxysmal positional vertigo   • Insomnia due to medical condition   • At high risk for falls   • Physical deconditioning   • Encounter for fitting and adjustment of neuropacemaker of spinal cord   • Primary osteoarthritis of both knees   • Primary osteoarthritis of right knee   • Status post total right knee replacement   • HTN (hypertension)   • HLD (hyperlipidemia)   • Hypothyroid   • Leukocytosis, mild, likely reactive   • Acute postoperative pain   • Acute blood loss anemia, mild, asymptomatic      Past Medical History:   Diagnosis Date   • Arthritis    • Back pain    • History of kidney stones    • Hyperlipidemia     triglycerides   • Hypertension    • Osteoporosis    • Seasonal allergies    • Thyroid disease    • TIA (transient ischemic attack)     \"Possibly\"   • Urinary frequency    • Urinary urgency    • Vertigo    • Wears dentures    • Wears glasses      Past Surgical History:   Procedure Laterality Date   • APPENDECTOMY     • CARDIAC CATHETERIZATION     • CHOLECYSTECTOMY     • COLONOSCOPY     • LUMBAR LAMINECTOMY  2015    Dr. Domenico Reid, [facility] L2-5   • SPINAL CORD STIMULATOR IMPLANT N/A 10/1/2018    Procedure: SPINAL CORD STIMULATOR INSERTION PHASE 1 ;  Surgeon: Vince Olivas MD;  Location: [facility];  Service: Neurosurgery   • TONSILLECTOMY     • TOTAL KNEE ARTHROPLASTY Right " 8/20/2019    Procedure: TOTAL KNEE ARTHROPLASTY RIGHT;  Surgeon: Faustino Weinstein MD;  Location: [facility];  Service: Orthopedics     General Information     Row Name 08/22/19 1655 08/22/19 1420       PT Evaluation Time/Intention    Subjective Information  --  complains of;pain  -HK    Document Type  therapy note (daily note)  -AA  therapy note (daily note)  -HK    Mode of Treatment  physical therapy;individual therapy  -AA  individual therapy;occupational therapy  -HK    Patient Effort  --  excellent  -HK    Row Name 08/22/19 1057          PT Evaluation Time/Intention    Document Type  therapy note (daily note)  -AA     Mode of Treatment  physical therapy;individual therapy  -AA     Row Name 08/22/19 1655 08/22/19 1420       General Information    Patient Profile Reviewed?  yes  -AA  --    Patient/Family Observations  --  Pt receieved supine in bed with IV heplocked and sister at bedside.   -HK    Existing Precautions/Restrictions  fall;other (see comments) R adductor nerve cath  -AA  fall;other (see comments) R adductor canal nerve catheter   -HK    Row Name 08/22/19 1057          General Information    Patient Profile Reviewed?  yes  -AA     Existing Precautions/Restrictions  fall;other (see comments) adductor nerve cath  -AA     Row Name 08/22/19 1655 08/22/19 1420       Cognitive Assessment/Intervention- PT/OT    Affect/Mental Status (Cognitive)  --  [facility]  -    Orientation Status (Cognition)  oriented x 4  -AA  oriented x 4  -HK    Follows Commands (Cognition)  --  follows one step commands;over 90% accuracy  -    Cognitive Function (Cognitive)  --  WFL  -HK    Row Name 08/22/19 1057          Cognitive Assessment/Intervention- PT/OT    Orientation Status (Cognition)  oriented x 4  -AA     Row Name 08/22/19 1655 08/22/19 1420       Safety Issues, Functional Mobility    Safety Issues Affecting Function (Mobility)  safety precautions follow-through/compliance;safety precaution awareness;sequencing abilities   -AA  safety precautions follow-through/compliance;safety precaution awareness  -    Impairments Affecting Function (Mobility)  balance;pain;strength  -AA  balance;pain;strength  -    Row Name 08/22/19 1057          Safety Issues, Functional Mobility    Safety Issues Affecting Function (Mobility)  awareness of need for assistance;insight into deficits/self awareness  -AA     Impairments Affecting Function (Mobility)  endurance/activity tolerance;pain;range of motion (ROM);strength  -AA       User Key  (r) = Recorded By, (t) = Taken By, (c) = Cosigned By    Initials Name Provider Type    HK Kelsey Osborn, OT Occupational Therapist    AA Michelle Meyer, PT Physical Therapist        Mobility     Row Name 08/22/19 1655 08/22/19 1420       Bed Mobility Assessment/Treatment    Bed Mobility Assessment/Treatment  supine-sit;scooting/bridging  -AA  supine-sit;scooting/bridging  -    Scooting/Bridging [place] (Bed Mobility)  supervision  -AA  contact guard;verbal cues  -    Supine-Sit [place] (Bed Mobility)  supervision  -AA  contact guard;verbal cues  -    Sit-Supine [place] (Bed Mobility)  contact guard  -AA  --    Bed Mobility, Safety Issues  --  decreased use of arms for pushing/pulling;decreased use of legs for bridging/pushing  -    Assistive Device (Bed Mobility)  bed rails  -AA  leg   -    Comment (Bed Mobility)  increased time to complete  -AA  Pt requires extra time/effort for completion of bed mobility.   -    Row Name 08/22/19 1058          Bed Mobility Assessment/Treatment    Bed Mobility Assessment/Treatment  scooting/bridging;supine-sit;sit-supine  -AA     Scooting/Bridging [place] (Bed Mobility)  conditional independence  -AA     Supine-Sit [place] (Bed Mobility)  supervision  -AA     Sit-Supine [place] (Bed Mobility)  contact guard;verbal cues  -AA     Assistive Device (Bed Mobility)  head of bed elevated;bed rails  -     Row Name 08/22/19 1655 08/22/19  1420       Transfer Assessment/Treatment    Transfer Assessment/Treatment  --  sit-stand transfer;stand-sit transfer;toilet transfer  -HK    Comment (Transfers)  Increased time allowed to perform with CGA and RW  -AA  Pt compelted sit to stand from EOB with Lebron and verbal cues. Pt completed sit to stand x2 from toilet requiring Lebron on first try and CGA on second. OT placed BSC over toilet to give raised surface and hand rails to push from.   -HK    Stand-Sit [place] (Transfers)  --  minimum assist (75% patient effort);verbal cues  -HK    [place] Level (Toilet Transfer)  --  minimum assist (75% patient effort);verbal cues  -HK    Assistive Device (Toilet Transfer)  --  walker, front-wheeled  -HK    Row Name 08/22/19 1058          Transfer Assessment/Treatment    Comment (Transfers)  VC for hand placement and RLE in front during transfers  -AA     Row Name 08/22/19 1655 08/22/19 1420       Sit-Stand Transfer    Sit-Stand [place] (Transfers)  contact guard;verbal cues  -AA  minimum assist (75% patient effort);verbal cues  -HK    Assistive Device (Sit-Stand Transfers)  walker, front-wheeled  -AA  walker, front-wheeled  -    Row Name 08/22/19 1058          Sit-Stand Transfer    Sit-Stand [place] (Transfers)  verbal cues;contact guard  -AA     Assistive Device (Sit-Stand Transfers)  walker, front-wheeled  -AA     Row Name 08/22/19 1700 08/22/19 1655       Gait/Stairs Assessment/Training    11577 - Gait Training Minutes   15  -AA  --    [place] Level (Gait)  --  contact guard;verbal cues  -AA    Assistive Device (Gait)  --  walker, front-wheeled  -AA    Distance in Feet (Gait)  --  250  -AA    Deviations/Abnormal Patterns (Gait)  --  bilateral deviations;giuseppe decreased;gait speed decreased;stride length decreased;right sided deviations;antalgic  -AA    Bilateral Gait Deviations  --  heel strike decreased;forward flexed posture  -AA    Right Sided Gait Deviations  --  weight shift ability  decreased  -AA    Comment (Gait/Stairs)  --  pt ambulated with RW and CGA with improved stepping RLE.  Continued cues for knee ROM R and heel strike.  Slow giuseppe  -    Row Name 08/22/19 1105 08/22/19 1058       Gait/Stairs Assessment/Training    76421 - Gait Training Minutes   17  -AA  --    [place] Level (Gait)  --  contact guard;verbal cues  -AA    Assistive Device (Gait)  --  walker, front-wheeled  -AA    Distance in Feet (Gait)  --  175, 40  -AA    Deviations/Abnormal Patterns (Gait)  --  bilateral deviations;giuseppe decreased;gait speed decreased;stride length decreased;right sided deviations;antalgic  -AA    Bilateral Gait Deviations  --  heel strike decreased;forward flexed posture  -AA    Right Sided Gait Deviations  --  weight shift ability decreased  -AA    Comment (Gait/Stairs)  --  pt reports being sore this date and distance decreased.  Pt with increased antalgic gait on RLE due to soreness.  VC for proper R knee ROM and stepping  -    Row Name 08/22/19 1655 08/22/19 1420       Mobility Assessment/Intervention    Extremity Weight-bearing Status  right lower extremity  -AA  right lower extremity  -HK    Right Lower Extremity (Weight-bearing Status)  weight-bearing as tolerated (WBAT)  -AA  weight-bearing as tolerated (WBAT)  -    Row Name 08/22/19 1058          Mobility Assessment/Intervention    Extremity Weight-bearing Status  right lower extremity  -     Right Lower Extremity (Weight-bearing Status)  weight-bearing as tolerated (WBAT)  -AA       User Key  (r) = Recorded By, (t) = Taken By, (c) = Cosigned By    Initials Name Provider Type     Kelsey Osborn, OT Occupational Therapist    AA Michelle Meyer, PT Physical Therapist        Obj/Interventions     Row Name 08/22/19 1657 08/22/19 1101       Therapeutic Exercise    Lower Extremity (Therapeutic Exercise)  gluteal sets;heel slides, right;LAQ (long arc quad), right;quad sets, bilateral;SLR (straight leg raise), right  -AA  gluteal  sets;LAQ (long arc quad), right;heel slides, right;quad sets, bilateral;SLR (straight leg raise), right  -AA    Lower Extremity Range of Motion (Therapeutic Exercise)  hip abduction/adduction, right;ankle dorsiflexion/plantar flexion, bilateral  -AA  hip abduction/adduction, right;ankle dorsiflexion/plantar flexion, bilateral  -AA    Exercise Type (Therapeutic Exercise)  AROM (active range of motion)  -AA  AROM (active range of motion)  -AA    Position (Therapeutic Exercise)  supine  -AA  supine  -AA    Sets/Reps (Therapeutic Exercise)  15  -AA  15  -AA    Row Name 08/22/19 1657 08/22/19 1420       Static Sitting Balance    Level of [place] (Unsupported Sitting, Static Balance)  independent  -AA  independent  -HK    Sitting Position (Unsupported Sitting, Static Balance)  sitting on edge of bed  -AA  other (see comments) on toilet   -    Time Able to Maintain Position (Unsupported Sitting, Static Balance)  more than 5 minutes  -AA  3 to 4 minutes  -    Row Name 08/22/19 1101          Static Sitting Balance    Level of [place] (Unsupported Sitting, Static Balance)  independent  -AA     Sitting Position (Unsupported Sitting, Static Balance)  sitting on edge of bed  -AA     Time Able to Maintain Position (Unsupported Sitting, Static Balance)  more than 5 minutes  -AA     Row Name 08/22/19 1657 08/22/19 1420       Dynamic Sitting Balance    Level of [place], Reaches Outside Midline (Sitting, Dynamic Balance)  independent  -AA  independent  -HK    Sitting Position, Reaches Outside Midline (Sitting, Dynamic Balance)  sitting on edge of bed  -AA  other (see comments) on toilet   -HK    Comment, Reaches Outside Midline (Sitting, Dynamic Balance)  --  completing jewel care   -    Row Name 08/22/19 1101          Dynamic Sitting Balance    Level of [place], Reaches Outside Midline (Sitting, Dynamic Balance)  supervision  -AA     Sitting Position, Reaches Outside Midline (Sitting, Dynamic Balance)   sitting on edge of bed  -AA     Row Name 08/22/19 1657 08/22/19 1420       Static Standing Balance    Level of [place] (Supported Standing, Static Balance)  contact guard assist  -AA  contact guard assist  -HK    Time Able to Maintain Position (Supported Standing, Static Balance)  3 to 4 minutes  -AA  1 to 2 minutes  -HK    Assistive Device Utilized (Supported Standing, Static Balance)  walker, rolling  -AA  walker, rolling  -HK    Row Name 08/22/19 1101          Static Standing Balance    Level of [place] (Supported Standing, Static Balance)  standby assist  -AA     Time Able to Maintain Position (Supported Standing, Static Balance)  more than 5 minutes  -AA     Assistive Device Utilized (Supported Standing, Static Balance)  walker, rolling  -AA     Row Name 08/22/19 1657 08/22/19 1420       Dynamic Standing Balance    Level of [place], Reaches Outside Midline (Standing, Dynamic Balance)  contact guard assist  -AA  contact guard assist  -HK    Time Able to Maintain Position, Reaches Outside Midline (Standing, Dynamic Balance)  1 to 2 minutes  -AA  1 to 2 minutes  -HK    Assistive Device Utilized (Supported Standing, Dynamic Balance)  walker, rolling  -AA  walker, rolling  -HK    Row Name 08/22/19 1101          Dynamic Standing Balance    Level of [place], Reaches Outside Midline (Standing, Dynamic Balance)  contact guard assist  -AA     Time Able to Maintain Position, Reaches Outside Midline (Standing, Dynamic Balance)  4 to 5 minutes  -     Assistive Device Utilized (Supported Standing, Dynamic Balance)  walker, rolling  -AA     Row Name 08/22/19 1420          Sensory Assessment/Intervention    Sensory General Assessment  no sensation deficits identified  -       User Key  (r) = Recorded By, (t) = Taken By, (c) = Cosigned By    Initials Name Provider Type    HK Kelsey Osborn, OT Occupational Therapist    AA Michelle Meyer, PT Physical Therapist        Goals/Plan     Row Name 08/22/19 1102           Bed Mobility Goal 1 (PT)    Progress/Outcomes (Bed Mobility Goal 1, PT)  goal met  -AA     Row Name 08/22/19 1102          ROM Goal 1 (PT)    Progress/Outcome (ROM Goal 1, PT)  goal met  -AA       User Key  (r) = Recorded By, (t) = Taken By, (c) = Cosigned By    Initials Name Provider Type    Michelle Farnsworth, PT Physical Therapist        Clinical Impression     Row Name 08/22/19 1658 08/22/19 1102       Pain Assessment    Additional Documentation  Pain Scale: Numbers Pre/Post-Treatment (Group)  -AA  Pain Scale: Numbers Pre/Post-Treatment (Group)  -AA    Row Name 08/22/19 1658 08/22/19 1420       Pain Scale: Numbers Pre/Post-Treatment    Pain Scale: Numbers, Pretreatment  3/10  -AA  3/10  -HK    Pain Scale: Numbers, Post-Treatment  3/10  -AA  3/10  -HK    Pain Location - Side  Right  -AA  Right  -HK    Pain Location - Orientation  generalized  -AA  generalized  -HK    Pain Location  knee  -AA  knee  -HK    Pain Intervention(s)  Cold applied;Repositioned;Ambulation/increased activity  -AA  Repositioned;Ambulation/increased activity  -HK    Row Name 08/22/19 1102          Pain Scale: Numbers Pre/Post-Treatment    Pain Scale: Numbers, Pretreatment  4/10  -AA     Pain Scale: Numbers, Post-Treatment  5/10  -AA     Pain Location - Side  Right  -AA     Pain Location - Orientation  generalized  -AA     Pain Location  knee  -AA     Pain Intervention(s)  Cold applied;Ambulation/increased activity;Repositioned  -AA     Row Name 08/22/19 1659 08/22/19 1658       Plan of Care Review    Plan of Care Reviewed With  patient  -AA  patient  -AA    Row Name 08/22/19 1420 08/22/19 1103       Plan of Care Review    Plan of Care Reviewed With  patient;spouse  -HK  patient;daughter  -AA    Row Name 08/22/19 1102 08/22/19 0813       Plan of Care Review    Plan of Care Reviewed With  patient;daughter  -AA  patient;daughter  -KL    Row Name 08/22/19 1420          Vital Signs    Pre Systolic BP Rehab  -- RN cleared for tx   -HK      Pre Patient Position  Supine  -HK     Intra Patient Position  Standing  -HK     Post Patient Position  Sitting  -HK     Row Name 08/22/19 1658 08/22/19 1420       Positioning and Restraints    Pre-Treatment Position  sitting in chair/recliner  -AA  in bed  -HK    Post Treatment Position  bed  -AA  chair  -HK    In Bed  notified nsg;sitting;with family/caregiver;call light within reach;encouraged to call for assist;exit alarm on  -AA  notified nsg;supine;encouraged to call for assist;call light within reach;exit alarm on  -HK    Row Name 08/22/19 1102          Positioning and Restraints    Pre-Treatment Position  in bed  -AA     Post Treatment Position  bed  -AA     In Bed  notified nsg;supine;call light within reach;encouraged to call for assist;with family/caregiver  -AA       User Key  (r) = Recorded By, (t) = Taken By, (c) = Cosigned By    Initials Name Provider Type    Yamini Gutierrez, RN Registered Nurse    Kelsey Kunz, OT Occupational Therapist    AA Michelle Meyer, PT Physical Therapist        Outcome Measures     Row Name 08/22/19 1700 08/22/19 1105       How much help from another person do you currently need...    Turning from your back to your side while in flat bed without using bedrails?  4  -AA  4  -AA    Moving from lying on back to sitting on the side of a flat bed without bedrails?  4  -AA  4  -AA    Moving to and from a bed to a chair (including a wheelchair)?  3  -AA  3  -AA    Standing up from a chair using your arms (e.g., wheelchair, bedside chair)?  3  -AA  3  -AA    Climbing 3-5 steps with a railing?  2  -AA  2  -AA    To walk in hospital room?  3  -AA  3  -AA    AM-PAC 6 Clicks Score (PT)  19  -AA  19  -AA    Row Name 08/22/19 1700 08/22/19 1420       Functional Assessment    Outcome Measure Options  AM-PAC 6 Clicks Basic Mobility (PT)  -AA  AM-PAC 6 Clicks Daily Activity (OT)  -HK    Row Name 08/22/19 1105          Functional Assessment    Outcome Measure Options  AM-PAC 6 Clicks  Basic Mobility (PT)  -AA       User Key  (r) = Recorded By, (t) = Taken By, (c) = Cosigned By    Initials Name Provider Type    Kelsey Kunz, OT Occupational Therapist    Michelle Farnsworth, PT Physical Therapist        Physical Therapy Education     Title: PT OT SLP Therapies (In Progress)     Topic: Physical Therapy (Done)     Point: Mobility training (Done)     Learning Progress Summary           Patient Eager, E, VU,DU,NR by AA at 8/22/2019  4:58 PM    Eager, E,D, VU,DU,NR by AA at 8/22/2019 11:03 AM    Eager, E,D,H, VU,NR by AA at 8/21/2019  2:03 PM    Eager, E,D,H, VU,NR by AA at 8/21/2019 11:47 AM    Acceptance, E,D, VU,NR by LR at 8/20/2019  4:19 PM    Comment:  Educated on precautions, weight bearing status, correct supine to sit t/f technique, correct sit<->stand t/f technique, correct gait mechanics, and progression of POC.   Family Eager, E,D, VU,DU,NR by AA at 8/22/2019 11:03 AM    Eager, E,D,H, VU,NR by AA at 8/21/2019  2:03 PM    Eager, E,D,H, VU,NR by AA at 8/21/2019 11:47 AM    Acceptance, E,D, VU,NR by LR at 8/20/2019  4:19 PM    Comment:  Educated on precautions, weight bearing status, correct supine to sit t/f technique, correct sit<->stand t/f technique, correct gait mechanics, and progression of POC.                   Point: Home exercise program (Done)     Learning Progress Summary           Patient Eager, E, VU,DU,NR by AA at 8/22/2019  4:58 PM    Eager, E,D, VU,DU,NR by AA at 8/22/2019 11:03 AM    Eager, E,D,H, VU,NR by AA at 8/21/2019  2:03 PM    Eager, E,D,H, VU,NR by AA at 8/21/2019 11:47 AM    Acceptance, E,D, VU,NR by LR at 8/20/2019  4:19 PM    Comment:  Educated on precautions, weight bearing status, correct supine to sit t/f technique, correct sit<->stand t/f technique, correct gait mechanics, and progression of POC.   Family RACHELL Bravo D, VU, DU,NR by AA at 8/22/2019 11:03 AM    RACHELL Bravo D, H, VU, NR by AA at 8/21/2019  2:03 PM    RACHELL Bravo D, H, VUNR by AA at 8/21/2019 11:47 AM     Acceptance, E,D, VU,NR by LR at 8/20/2019  4:19 PM    Comment:  Educated on precautions, weight bearing status, correct supine to sit t/f technique, correct sit<->stand t/f technique, correct gait mechanics, and progression of POC.                   Point: Body mechanics (Done)     Learning Progress Summary           Patient Eager, E, VU,DU,NR by AA at 8/22/2019  4:58 PM    Eager, E,D, VU,DU,NR by AA at 8/22/2019 11:03 AM    Eager, E,D,H, VU,NR by AA at 8/21/2019  2:03 PM    Eager, E,D,H, VU,NR by AA at 8/21/2019 11:47 AM    Acceptance, E,D, VU,NR by LR at 8/20/2019  4:19 PM    Comment:  Educated on precautions, weight bearing status, correct supine to sit t/f technique, correct sit<->stand t/f technique, correct gait mechanics, and progression of POC.   Family Eager, E,D, VU,DU,NR by AA at 8/22/2019 11:03 AM    Eager, E,D,H, VU,NR by AA at 8/21/2019  2:03 PM    Eager, E,D,H, VU,NR by AA at 8/21/2019 11:47 AM    Acceptance, E,D, VU,NR by LR at 8/20/2019  4:19 PM    Comment:  Educated on precautions, weight bearing status, correct supine to sit t/f technique, correct sit<->stand t/f technique, correct gait mechanics, and progression of POC.                   Point: Precautions (Done)     Learning Progress Summary           Patient Eager, E, VU,DU,NR by AA at 8/22/2019  4:58 PM    Eager, E,D, VU,DU,NR by AA at 8/22/2019 11:03 AM    Eager, E,D,H, VU,NR by AA at 8/21/2019  2:03 PM    Eager, E,D,H, VU,NR by AA at 8/21/2019 11:47 AM    Acceptance, E,D, VU,NR by LR at 8/20/2019  4:19 PM    Comment:  Educated on precautions, weight bearing status, correct supine to sit t/f technique, correct sit<->stand t/f technique, correct gait mechanics, and progression of POC.   Family RACHELL Bravo D, VU, DU,NR by AA at 8/22/2019 11:03 AM    RACHELL Bravo D, H, VU,NR by AA at 8/21/2019  2:03 PM    RACHELL Bravo D, H, VUNR by AA at 8/21/2019 11:47 AM    RACHELL Seals D, VUNR by LR at 8/20/2019  4:19 PM    Comment:  Educated on precautions, weight  bearing status, correct supine to sit t/f technique, correct sit<->stand t/f technique, correct gait mechanics, and progression of POC.                               User Key     Initials Effective Dates Name Provider Type Discipline    LR 06/19/15 -  Danisha Navarro, PT Physical Therapist PT    AA 04/02/18 -  Michelle Meyer PT Physical Therapist PT              PT Recommendation and Plan     Outcome Summary/Treatment Plan (PT)  Anticipated Discharge Disposition (PT): inpatient rehabilitation facility  Plan of Care Reviewed With: patient  Progress: improving  Outcome Summary: Pt ambulated 250 feet with RW and CGA with cues for giuseppe, step length, and heel strike.  Good sequencing during turns.  CGA for transfers.  Increased time for mobility due to weakness and safety.  Bed mobility SPV.  Good SLR RLE.  Pt pending rehab placement     Time Calculation:   PT Charges     Row Name 08/22/19 1700 08/22/19 1105          Time Calculation    Start Time  1625  -AA  0911  -AA     PT Received On  08/22/19  -AA  08/22/19  -AA     PT Goal Re-Cert Due Date  08/30/19  -AA  08/30/19  -AA        Time Calculation- PT    Total Timed Code Minutes- PT  25 minute(s)  -AA  39 minute(s)  -AA        Timed Charges    26267 - PT Therapeutic Exercise Minutes  10  -AA  12  -AA     64838 - Gait Training Minutes   15  -AA  17  -AA     20059 - PT Therapeutic Activity Minutes  --  10  -AA       User Key  (r) = Recorded By, (t) = Taken By, (c) = Cosigned By    Initials Name Provider Type    AA Michelle Meyer, PT Physical Therapist        Therapy Charges for Today     Code Description Service Date Service Provider Modifiers Qty    88685515256 HC PT THER PROC EA 15 MIN 8/21/2019 Michelle Meyer, PT GP 1    69620332998 HC GAIT TRAINING EA 15 MIN 8/21/2019 Michelle Meyer, PT GP 1    65169218618 HC GAIT TRAINING EA 15 MIN 8/21/2019 Michelle Meyer, PT GP 1    43660603598 HC PT THER PROC EA 15 MIN 8/21/2019 Michelle Meyer, PT GP 1    55098891418 HC  GAIT TRAINING EA 15 MIN 8/22/2019 Mitch April L, PT GP 1    61810834250 HC PT THER PROC EA 15 MIN 8/22/2019 Mitch April L, PT GP 1    61716347992 HC PT THERAPEUTIC ACT EA 15 MIN 8/22/2019 Mitch April L, PT GP 1    26031963292 HC PT THER PROC EA 15 MIN 8/22/2019 Mitch April L, PT  1    71135661577 HC GAIT TRAINING EA 15 MIN 8/22/2019 Mitch April L, PT  1          PT G-Codes  Outcome Measure Options: AM-PAC 6 Clicks Basic Mobility (PT)  AM-PAC 6 Clicks Score (PT): 19  AM-PAC 6 Clicks Score (OT): 19 April L Mitch, PT  8/22/2019

## 2019-08-22 NOTE — THERAPY TREATMENT NOTE
"Acute Care - Occupational Therapy Treatment Note  [facility]     Patient Name: Jewels Venegas  : 1940  MRN: [id number]  Today's Date: 2019     Date of Referral to OT: 19  Referring Physician: MD Js    Admit Date: 2019       ICD-10-CM ICD-9-CM   1. Impaired functional mobility, balance, gait, and endurance Z74.09 V49.89   2. Primary osteoarthritis of right knee M17.11 715.16   3. Impaired mobility and ADLs Z74.09 799.89     Patient Active Problem List   Diagnosis   • Post laminectomy syndrome   • S/P insertion of spinal cord stimulator   • Osteoporosis   • Lumbar stenosis with neurogenic claudication   • Polyneuropathy   • Spondylarthrosis   • Benign paroxysmal positional vertigo   • Insomnia due to medical condition   • At high risk for falls   • Physical deconditioning   • Encounter for fitting and adjustment of neuropacemaker of spinal cord   • Primary osteoarthritis of both knees   • Primary osteoarthritis of right knee   • Status post total right knee replacement   • HTN (hypertension)   • HLD (hyperlipidemia)   • Hypothyroid   • Leukocytosis, mild, likely reactive   • Acute postoperative pain   • Acute blood loss anemia, mild, asymptomatic      Past Medical History:   Diagnosis Date   • Arthritis    • Back pain    • History of kidney stones    • Hyperlipidemia     triglycerides   • Hypertension    • Osteoporosis    • Seasonal allergies    • Thyroid disease    • TIA (transient ischemic attack)     \"Possibly\"   • Urinary frequency    • Urinary urgency    • Vertigo    • Wears dentures    • Wears glasses      Past Surgical History:   Procedure Laterality Date   • APPENDECTOMY     • CARDIAC CATHETERIZATION     • CHOLECYSTECTOMY     • COLONOSCOPY     • LUMBAR LAMINECTOMY  2015    Dr. Domenico Reid, [facility] L2-5   • SPINAL CORD STIMULATOR IMPLANT N/A 10/1/2018    Procedure: SPINAL CORD STIMULATOR INSERTION PHASE 1 ;  Surgeon: Vince Olivas MD;  Location: [facility]" [facility];  Service: Neurosurgery   • TONSILLECTOMY     • TOTAL KNEE ARTHROPLASTY Right 8/20/2019    Procedure: TOTAL KNEE ARTHROPLASTY RIGHT;  Surgeon: Faustino Weinstein MD;  Location:  [facility];  Service: Orthopedics       Therapy Treatment    Rehabilitation Treatment Summary     Row Name 08/22/19 1420             Treatment Time/Intention    Discipline  occupational therapist  -HK      Document Type  therapy note (daily note)  -HK      Subjective Information  complains of;pain  -HK      Mode of Treatment  individual therapy;occupational therapy  -HK      Patient/Family Observations  Pt receieved supine in bed with IV heplocked and sister at bedside.   -HK      Care Plan Review  care plan/treatment goals reviewed;risks/benefits reviewed;patient/other agree to care plan  -HK      Care Plan Review, Other Participant(s)  sibling  -HK      Patient Effort  excellent  -HK      Existing Precautions/Restrictions  fall;other (see comments) R adductor canal nerve catheter   -HK      Recorded by [HK] Kelsey Osborn, OT 08/22/19 1612      Row Name 08/22/19 1420             Vital Signs    Pre Systolic BP Rehab  -- RN cleared for tx   -HK      Pre Patient Position  Supine  -HK      Intra Patient Position  Standing  -HK      Post Patient Position  Sitting  -HK      Recorded by [HK] Kelsey Osborn, OT 08/22/19 1612      Row Name 08/22/19 1420             Cognitive Assessment/Intervention    Additional Documentation  Cognitive Assessment/Intervention (Group)  -HK      Recorded by [HK] Kelsey Osborn, OT 08/22/19 1612      Row Name 08/22/19 1420             Cognitive Assessment/Intervention- PT/OT    Affect/Mental Status (Cognitive)  WFL  -HK      Orientation Status (Cognition)  oriented x 4  -HK      Follows Commands (Cognition)  follows one step commands;over 90% accuracy  -HK      Cognitive Function (Cognitive)  WFL  -HK      Recorded by [HK] Kelsey Osborn, OT 08/22/19 1612      Row Name 08/22/19 1420             Safety Issues,  Functional Mobility    Safety Issues Affecting Function (Mobility)  safety precautions follow-through/compliance;safety precaution awareness  -HK      Impairments Affecting Function (Mobility)  balance;pain;strength  -HK      Recorded by [HK] Kelsey Osborn, OT 08/22/19 1612      Row Name 08/22/19 1420             Mobility Assessment/Intervention    Extremity Weight-bearing Status  right lower extremity  -HK      Right Lower Extremity (Weight-bearing Status)  weight-bearing as tolerated (WBAT)  -HK      Recorded by [HK] Kelsey Osborn, OT 08/22/19 1612      Row Name 08/22/19 1420             Bed Mobility Assessment/Treatment    Bed Mobility Assessment/Treatment  supine-sit;scooting/bridging  -HK      Scooting/Bridging Gove (Bed Mobility)  contact guard;verbal cues  -HK      Supine-Sit Gove (Bed Mobility)  contact guard;verbal cues  -HK      Bed Mobility, Safety Issues  decreased use of arms for pushing/pulling;decreased use of legs for bridging/pushing  -HK      Assistive Device (Bed Mobility)  leg   -HK      Comment (Bed Mobility)  Pt requires extra time/effort for completion of bed mobility.   -HK      Recorded by [HK] Kelsey Osborn, OT 08/22/19 1612      Row Name 08/22/19 1420             Functional Mobility    Functional Mobility- Ind. Level  contact guard assist;verbal cues required  -HK      Functional Mobility- Device  rolling walker  -HK      Functional Mobility-Distance (Feet)  35  -HK      Functional Mobility- Safety Issues  step length decreased;weight-shifting ability decreased  -HK      Functional Mobility- Comment  Pt requires extra time/effort for all mobility.   -HK      Recorded by [HK] Kelsey Osborn, OT 08/22/19 1612      Row Name 08/22/19 1420             Transfer Assessment/Treatment    Transfer Assessment/Treatment  sit-stand transfer;stand-sit transfer;toilet transfer  -HK      Comment (Transfers)  Pt compelted sit to stand from EOB with Lebron and verbal cues. Pt completed  sit to stand x2 from toilet requiring Lebron on first try and CGA on second. OT placed BSC over toilet to give raised surface and hand rails to push from.   -HK      Recorded by [HK] Kelsey Osborn, OT 08/22/19 1612      Row Name 08/22/19 1420             Sit-Stand Transfer    Sit-Stand Lea (Transfers)  minimum assist (75% patient effort);verbal cues  -HK      Assistive Device (Sit-Stand Transfers)  walker, front-wheeled  -HK      Recorded by [HK] Kelsey Osborn, OT 08/22/19 1612      Row Name 08/22/19 1420             Stand-Sit Transfer    Stand-Sit Lea (Transfers)  minimum assist (75% patient effort);verbal cues  -HK      Assistive Device (Stand-Sit Transfers)  walker, front-wheeled  -HK      Recorded by [HK] Kelsey Osborn, OT 08/22/19 1612      Row Name 08/22/19 1420             Toilet Transfer    Type (Toilet Transfer)  sit-stand;stand-sit  -HK      Lea Level (Toilet Transfer)  minimum assist (75% patient effort);verbal cues  -HK      Assistive Device (Toilet Transfer)  walker, front-wheeled  -HK      Recorded by [HK] Kelsey Osborn, OT 08/22/19 1612      Row Name 08/22/19 1420             ADL Assessment/Intervention    BADL Assessment/Intervention  lower body dressing;toileting  -HK      Recorded by [HK] Kelsey Osborn, OT 08/22/19 1612      Row Name 08/22/19 1420             Lower Body Dressing Assessment/Training    Lower Body Dressing Lea Level  doff;don;socks  -HK      Assistive Devices (Lower Body Dressing)  sock-aid  -HK      Lower Body Dressing Position  unsupported sitting  -HK      Comment (Lower Body Dressing)  OT issued sock aid and educated pt on use for completion of LBD. Pt has reacher, shoe horn and LH sponge at home.   -HK      Recorded by [HK] Kelsey Osborn, OT 08/22/19 1612      Row Name 08/22/19 1420             Grooming Assessment/Training    [place] Level (Grooming)  wash face, hands;supervision  -HK      Grooming Position  supported standing  -HK       Comment (Grooming)  Pt stood at sink with supervision and RW to wash hands.   -HK      Recorded by [HK] Kelsey Osborn, OT 08/22/19 1612      Row Name 08/22/19 1420             Toileting Assessment/Training    [place] Level (Toileting)  adjust/manage clothing;perform perineal hygiene;minimum assist (75% patient effort);verbal cues  -HK      Toileting Position  supported standing;unsupported sitting  -HK      Comment (Toileting)  Pt required Lebron for clothing management and verbal cues for completion around nerve catheter.   -HK      Recorded by [HK] Kelsey Osborn, OT 08/22/19 1612      Row Name 08/22/19 1420             BADL Safety/Performance    Impairments, BADL Safety/Performance  balance;pain;strength  -HK      Recorded by [HK] Kelsey Osborn, OT 08/22/19 1612      Row Name 08/22/19 1420             Motor Skills Assessment/Interventions    Additional Documentation  Balance (Group)  -HK      Recorded by [HK] Kelsey Osborn, OT 08/22/19 1612      Row Name 08/22/19 1420             Balance    Balance  static sitting balance;static standing balance;dynamic standing balance;dynamic sitting balance  -HK      Recorded by [HK] Kelsey Osborn, OT 08/22/19 1612      Row Name 08/22/19 1420             Static Sitting Balance    Level of [place] (Unsupported Sitting, Static Balance)  independent  -HK      Sitting Position (Unsupported Sitting, Static Balance)  other (see comments) on toilet   -HK      Time Able to Maintain Position (Unsupported Sitting, Static Balance)  3 to 4 minutes  -HK      Recorded by [HK] Kelsey Osborn, OT 08/22/19 1612      Row Name 08/22/19 1420             Dynamic Sitting Balance    Level of [place], Reaches Outside Midline (Sitting, Dynamic Balance)  independent  -HK      Sitting Position, Reaches Outside Midline (Sitting, Dynamic Balance)  other (see comments) on toilet   -HK      Comment, Reaches Outside Midline (Sitting, Dynamic Balance)  completing jewel care   -HK      Recorded by  [HK] Kelsey Osborn, OT 08/22/19 1612      Row Name 08/22/19 1420             Static Standing Balance    Level of [place] (Supported Standing, Static Balance)  contact guard assist  -HK      Time Able to Maintain Position (Supported Standing, Static Balance)  1 to 2 minutes  -HK      Assistive Device Utilized (Supported Standing, Static Balance)  walker, rolling  -HK      Recorded by [HK] Kelsey Osborn, OT 08/22/19 1612      Row Name 08/22/19 1420             Dynamic Standing Balance    Level of [place], Reaches Outside Midline (Standing, Dynamic Balance)  contact guard assist  -HK      Time Able to Maintain Position, Reaches Outside Midline (Standing, Dynamic Balance)  1 to 2 minutes  -HK      Assistive Device Utilized (Supported Standing, Dynamic Balance)  walker, rolling  -HK      Recorded by [HK] Kelsey Osborn, OT 08/22/19 1612      Row Name 08/22/19 1420             Positioning and Restraints    Pre-Treatment Position  in bed  -HK      Post Treatment Position  chair  -HK      In Bed  notified nsg;supine;encouraged to call for assist;call light within reach;exit alarm on  -HK      Recorded by [HK] Kelsey Osborn, OT 08/22/19 1612      Row Name 08/22/19 1420             Pain Scale: Numbers Pre/Post-Treatment    Pain Scale: Numbers, Pretreatment  3/10  -HK      Pain Scale: Numbers, Post-Treatment  3/10  -HK      Pain Location - Side  Right  -HK      Pain Location - Orientation  generalized  -HK      Pain Location  knee  -HK      Pain Intervention(s)  Repositioned;Ambulation/increased activity  -HK      Recorded by [HK] Kelsey Osborn, OT 08/22/19 1612      Row Name 08/22/19 1420             Sensory Assessment/Intervention    Sensory General Assessment  no sensation deficits identified  -HK      Recorded by [HK] Kelsey Osborn, OT 08/22/19 1612      Row Name                Wound 08/20/19 1138 Right knee Incision    Wound - Properties Group Date first assessed: 08/20/19 [JA] Time first assessed: 1138 [JA]  Side: Right [JA] Location: knee [JA] Primary Wound Type: Incision [JA] Recorded by:  [JA] Jorge Balbuena RN 08/20/19 1138    Row Name 08/22/19 1420             Coping    Observed Emotional State  accepting;calm;cooperative  -HK      Recorded by [HK] Kelsey Osborn, OT 08/22/19 1612      Row Name 08/22/19 1420             Plan of Care Review    Plan of Care Reviewed With  patient;spouse  -HK      Recorded by [HK] Kelsey Osborn, OT 08/22/19 1612      Row Name 08/22/19 1420             Outcome Summary/Treatment Plan (OT)    Daily Summary of Progress (OT)  progress toward functional goals as expected  -HK      Recorded by [HK] Kelsey Osborn, OT 08/22/19 1612        User Key  (r) = Recorded By, (t) = Taken By, (c) = Cosigned By    Initials Name Effective Dates Discipline    Jorge Christie RN 06/16/16 -  Nurse    HK Kelsey Osborn, OT 03/07/18 -  OT        Wound 08/20/19 1138 Right knee Incision (Active)   Dressing Appearance dry;intact;no drainage 8/21/2019  8:54 PM   Closure Liquid skin adhesive 8/21/2019  8:54 PM   Drainage Amount none 8/21/2019  8:54 PM     Rehab Goal Summary     Row Name 08/22/19 1102             Bed Mobility Goal 1 (PT)    Progress/Outcomes (Bed Mobility Goal 1, PT)  goal met  -AA         ROM Goal 1 (PT)    Progress/Outcome (ROM Goal 1, PT)  goal met  -AA        User Key  (r) = Recorded By, (t) = Taken By, (c) = Cosigned By    Initials Name Provider Type Discipline    Michelle Farnsworth PT Physical Therapist PT        Occupational Therapy Education     Title: PT OT SLP Therapies (In Progress)     Topic: Occupational Therapy (In Progress)     Point: ADL training (Done)     Description: Instruct learner(s) on proper safety adaptation and remediation techniques during self care or transfers.   Instruct in proper use of assistive devices.    Learning Progress Summary           Patient Acceptance, E,TB,D, VU by  at 8/22/2019  2:20 PM    Comment:  Pt educated on ADL retraining with use of AE  for completion of LBD, safety precautions, and appropriate body mechanics to maintain knee precautions.    Acceptance, E, VU by  at 8/21/2019  8:07 AM   Family Acceptance, E, VU by  at 8/21/2019  8:07 AM                   Point: Precautions (Done)     Description: Instruct learner(s) on prescribed precautions during self-care and functional transfers.    Learning Progress Summary           Patient Acceptance, E,TB,D, VU by  at 8/22/2019  2:20 PM    Comment:  Pt educated on ADL retraining with use of AE for completion of LBD, safety precautions, and appropriate body mechanics to maintain knee precautions.    Acceptance, E, VU by  at 8/21/2019  8:07 AM   Family Acceptance, E, VU by  at 8/21/2019  8:07 AM                   Point: Body mechanics (Done)     Description: Instruct learner(s) on proper positioning and spine alignment during self-care, functional mobility activities and/or exercises.    Learning Progress Summary           Patient Acceptance, E,TB,D, VU by  at 8/22/2019  2:20 PM    Comment:  Pt educated on ADL retraining with use of AE for completion of LBD, safety precautions, and appropriate body mechanics to maintain knee precautions.    Acceptance, E, VU by  at 8/21/2019  8:07 AM   Family Acceptance, E, VU by  at 8/21/2019  8:07 AM                               User Key     Initials Effective Dates Name Provider Type Discipline     03/07/18 -  Kelsey Osborn, OT Occupational Therapist OT     07/18/19 -  Danisha Cardozo OT Occupational Therapist OT                OT Recommendation and Plan  Outcome Summary/Treatment Plan (OT)  Daily Summary of Progress (OT): progress toward functional goals as expected  Daily Summary of Progress (OT): progress toward functional goals as expected  Plan of Care Review  Plan of Care Reviewed With: patient, spouse  Plan of Care Reviewed With: patient, spouse  Outcome Summary: Pt completes sit to stand with Lebron and ambulates with CGA. Pt requires extra  time/effort for all mobility. OT issued sock aid and educated pt on use for completion of LBD. Continue IP OT per POC.   Outcome Measures     Row Name 08/22/19 1420 08/21/19 0807          How much help from another is currently needed...    Putting on and taking off regular lower body clothing?  3  -HK  3  -LC     Bathing (including washing, rinsing, and drying)  3  -HK  3  -LC     Toileting (which includes using toilet bed pan or urinal)  3  -HK  3  -LC     Putting on and taking off regular upper body clothing  3  -HK  3  -LC     Taking care of personal grooming (such as brushing teeth)  4  -HK  4  -LC     Eating meals  3  -HK  4  -LC     AM-PAC 6 Clicks Score (OT)  19  -HK  20  -LC        Functional Assessment    Outcome Measure Options  AM-PAC 6 Clicks Daily Activity (OT)  -HK  AM-PAC 6 Clicks Daily Activity (OT)  -       User Key  (r) = Recorded By, (t) = Taken By, (c) = Cosigned By    Initials Name Provider Type     Kelsey Osborn, OT Occupational Therapist    Danisha Ernst, OT Occupational Therapist           Time Calculation:   Time Calculation- OT     Row Name 08/22/19 1420 08/22/19 1105          Time Calculation- OT    OT Start Time  1420 -HK  --     OT Received On  08/22/19 -  --        Timed Charges    56467 - Gait Training Minutes   --  17  -AA     84027 - OT Therapeutic Activity Minutes  10  -HK  --     43049 - OT Self Care/Mgmt Minutes  53  -HK  --       User Key  (r) = Recorded By, (t) = Taken By, (c) = Cosigned By    Initials Name Provider Type     Kelsey Osborn, OT Occupational Therapist    Michelle Farnsworth, PT Physical Therapist        Therapy Charges for Today     Code Description Service Date Service Provider Modifiers Qty    91293793970 HC OT SELF CARE/MGMT/TRAIN EA 15 MIN 8/22/2019 Kelsey Osborn, OT GO 3    23665828255 HC OT THERAPEUTIC ACT EA 15 MIN 8/22/2019 Kelsey Osborn, OT  1               Kelsey Osborn OT  8/22/2019

## 2019-08-22 NOTE — PLAN OF CARE
Problem: Patient Care Overview  Goal: Plan of Care Review  Outcome: Ongoing (interventions implemented as appropriate)   08/22/19 1444   Coping/Psychosocial   Plan of Care Reviewed With patient   Plan of Care Review   Progress improving   OTHER   Outcome Summary Pt ambulated 250 feet with RW and CGA with cues for giuseppe, step length, and heel strike. Good sequencing during turns. CGA for transfers. Increased time for mobility due to weakness and safety. Bed mobility SPV. Good SLR RLE. Pt pending rehab placement

## 2019-08-22 NOTE — PROGRESS NOTES
"      [facility] [place]    Subjective      LOS: 2 days   Patient Care Team:  Renée Multani MD as PCP - General  Renée Multani MD as PCP - Family Medicine  Apro, Juju Pryor PA-C as Physician Assistant (Physician Assistant)  Doron Gale MD as Consulting Physician (Pain Medicine)  Domenico Reid MD as Consulting Physician (Neurosurgery)    Chief complaint: Right knee pain       Interval History:   No new complaints this morning.    Objective      Vital Signs  Temp (24hrs), Av.3 °F (36.8 °C), Min:97.9 °F (36.6 °C), Max:98.5 °F (36.9 °C)      /55 (BP Location: Left arm, Patient Position: Sitting)   Pulse 90   Temp 98.4 °F (36.9 °C) (Oral)   Resp 16   Ht 168.9 cm (66.5\")   Wt 88.5 kg (194 lb 16 oz)   SpO2 96%   Breastfeeding? No   BMI 31.01 kg/m²     Examination:   Examination of the right knee: The wound is clean, dry, and intact.  Ankle dorsiflexion, ankle plantar flexion, and EHL are intact.  Sensation intact in the foot to light touch.  2+ dorsalis pedis pulse.  Straight leg raise is intact.      Labs:  Results from last 7 days   Lab Units 19  0427 19  0527   WBC 10*3/mm3 9.86 11.47*   HEMOGLOBIN g/dL 9.3* 9.6*   HEMATOCRIT % 29.5* 29.1*   MCV fL 98.7* 97.3*   PLATELETS 10*3/mm3 235 233       PT:  Note from yesterday:  Pt ambulated 250 feet with RW and CGA with cues for R knee ROM and heel strike. Transfer CGA. HEP reviewed and completed. Adductor nerve cath intact. Good SLR RLE supine. Recommend DC to inpatient rehab      Results Review:     I reviewed the patient's new clinical results.    Assessment and Plan     Assessment:   Status post right total knee arthroplasty-doing well      Status post total right knee replacement    Primary osteoarthritis of right knee    HTN (hypertension)    HLD (hyperlipidemia)    Hypothyroid    Leukocytosis, mild, likely reactive    Acute postoperative pain    Acute blood loss anemia, mild, asymptomatic       Plan " for disposition: Plan for discharge to [facility] when bed available.  Follow-up with me in 3 weeks as planned.     Provider Department Center   9/11/2019 10:50 AM Faustino Weinstein MD [facility]    Appt Notes: 3 WEEK POSTOP           Faustino Weinstein MD  08/22/19  10:19 AM

## 2019-08-22 NOTE — PROGRESS NOTES
Continued Stay Note  [facility]     Patient Name: Jewels Venegas  MRN: [id number]  Today's Date: 8/22/2019    Admit Date: 8/20/2019    Discharge Plan     Row Name 08/22/19 1502       Plan    Plan  [facility]     Patient/Family in Agreement with Plan  yes    Plan Comments  Followed up with Bowen Luu, regarding Ms. Venegas's rehab admission.  Bowen stated that the patient's admission is still pending pre cert.  Contacted Briseida with [employer] to check on status and she will check and let me know. Notified Ms. Venegas at the bedside.   CM will follow up.    Final Discharge Disposition Code  03 - skilled nursing facility (SNF)        Discharge Codes    No documentation.       Expected Discharge Date and Time     Expected Discharge Date Expected Discharge Time    Aug 22, 2019             Samanta Morales

## 2019-08-22 NOTE — PLAN OF CARE
Problem: Patient Care Overview  Goal: Plan of Care Review  Outcome: Ongoing (interventions implemented as appropriate)   08/22/19 0215   Coping/Psychosocial   Plan of Care Reviewed With patient;spouse   Plan of Care Review   Progress improving   OTHER   Outcome Summary Pt completes sit to stand with Lebron and ambulates with CGA. Pt requires extra time/effort for all mobility. OT issued sock aid and educated pt on use for completion of LBD. Continue IP OT per POC.

## 2019-08-23 VITALS
OXYGEN SATURATION: 99 % | SYSTOLIC BLOOD PRESSURE: 142 MMHG | TEMPERATURE: 98 F | DIASTOLIC BLOOD PRESSURE: 67 MMHG | RESPIRATION RATE: 17 BRPM | BODY MASS INDEX: 31.34 KG/M2 | HEIGHT: 66 IN | WEIGHT: 195 LBS | HEART RATE: 92 BPM

## 2019-08-23 LAB
DEPRECATED RDW RBC AUTO: 50.2 FL (ref 37–54)
ERYTHROCYTE [DISTWIDTH] IN BLOOD BY AUTOMATED COUNT: 13.9 % (ref 12.3–15.4)
HCT VFR BLD AUTO: 29.4 % (ref 34–46.6)
HGB BLD-MCNC: 9.2 G/DL (ref 12–15.9)
MCH RBC QN AUTO: 31.1 PG (ref 26.6–33)
MCHC RBC AUTO-ENTMCNC: 31.3 G/DL (ref 31.5–35.7)
MCV RBC AUTO: 99.3 FL (ref 79–97)
PLATELET # BLD AUTO: 241 10*3/MM3 (ref 140–450)
PMV BLD AUTO: 9.8 FL (ref 6–12)
RBC # BLD AUTO: 2.96 10*6/MM3 (ref 3.77–5.28)
WBC NRBC COR # BLD: 8.99 10*3/MM3 (ref 3.4–10.8)

## 2019-08-23 PROCEDURE — 99024 POSTOP FOLLOW-UP VISIT: CPT | Performed by: ORTHOPAEDIC SURGERY

## 2019-08-23 PROCEDURE — 97116 GAIT TRAINING THERAPY: CPT

## 2019-08-23 PROCEDURE — 94799 UNLISTED PULMONARY SVC/PX: CPT

## 2019-08-23 PROCEDURE — 97110 THERAPEUTIC EXERCISES: CPT

## 2019-08-23 PROCEDURE — 85027 COMPLETE CBC AUTOMATED: CPT | Performed by: ORTHOPAEDIC SURGERY

## 2019-08-23 PROCEDURE — 97530 THERAPEUTIC ACTIVITIES: CPT

## 2019-08-23 RX ORDER — ASPIRIN 325 MG
325 TABLET, DELAYED RELEASE (ENTERIC COATED) ORAL DAILY
Qty: 30 TABLET | Refills: 0 | Status: SHIPPED | OUTPATIENT
Start: 2019-08-24 | End: 2019-12-20

## 2019-08-23 RX ORDER — HYDROCODONE BITARTRATE AND ACETAMINOPHEN 5; 325 MG/1; MG/1
1 TABLET ORAL EVERY 4 HOURS PRN
Qty: 40 TABLET | Refills: 0 | Status: SHIPPED | OUTPATIENT
Start: 2019-08-23 | End: 2019-08-30

## 2019-08-23 RX ORDER — DOCUSATE SODIUM 100 MG/1
100 CAPSULE, LIQUID FILLED ORAL 2 TIMES DAILY PRN
Qty: 60 CAPSULE | Refills: 0 | Status: SHIPPED | OUTPATIENT
Start: 2019-08-23 | End: 2019-12-20

## 2019-08-23 RX ADMIN — ASPIRIN 325 MG: 325 TABLET, DELAYED RELEASE ORAL at 08:32

## 2019-08-23 RX ADMIN — HYDROCODONE BITARTRATE AND ACETAMINOPHEN 1 TABLET: 5; 325 TABLET ORAL at 05:07

## 2019-08-23 RX ADMIN — METHOCARBAMOL 750 MG: 750 TABLET, FILM COATED ORAL at 08:32

## 2019-08-23 RX ADMIN — AMLODIPINE BESYLATE 5 MG: 5 TABLET ORAL at 08:31

## 2019-08-23 RX ADMIN — HYDROCODONE BITARTRATE AND ACETAMINOPHEN 1 TABLET: 5; 325 TABLET ORAL at 12:55

## 2019-08-23 RX ADMIN — LISINOPRIL 20 MG: 20 TABLET ORAL at 08:32

## 2019-08-23 RX ADMIN — MELOXICAM 15 MG: 7.5 TABLET ORAL at 08:31

## 2019-08-23 RX ADMIN — HYDROCODONE BITARTRATE AND ACETAMINOPHEN 1 TABLET: 5; 325 TABLET ORAL at 08:32

## 2019-08-23 RX ADMIN — HYDROCODONE BITARTRATE AND ACETAMINOPHEN 1 TABLET: 5; 325 TABLET ORAL at 00:59

## 2019-08-23 RX ADMIN — LEVOTHYROXINE SODIUM 112 MCG: 112 TABLET ORAL at 08:31

## 2019-08-23 NOTE — PLAN OF CARE
Problem: Patient Care Overview  Goal: Plan of Care Review  Outcome: Ongoing (interventions implemented as appropriate)   08/23/19 1596   Coping/Psychosocial   Plan of Care Reviewed With patient;daughter   Plan of Care Review   Progress improving   OTHER   Outcome Summary pt with moderate amt of pain this shift. arrow pump empty and pulled. prn pain pills given with moderate relief. denies numbness or tingling. ambulating to restroom with assist of one, gait belt and walker. incision clean and dry with prineo. vs noted.

## 2019-08-23 NOTE — PROGRESS NOTES
Case Management Discharge Note    Final Note: Met with Ms. Venegas and her sister, Noelle, at the bedside for discharge planning.  [facility] is still pending insurance, however, Ms. Venegas is ambulating 180-250 feet with PT, and will likely be denied for inpatient rehab.  Discussed Ms. Venegas with [facility] and she feels that Ms. Venegas would be fine to return home with family assistance.  Ms. Venegas stated that she will have sufficient assistance from her family.  She requested going straight to Outpatient PT, but she cannot remember the name of the agency.  CM gave Ms. Venegas the prescription and clinical for Outpatient PT and requested that she schedule her appointment when she returns home.  She denies any DME needs.  Ms. Venegas will be transported home by her sister.      Destination      No service has been selected for the patient.      Durable Medical Equipment      No service has been selected for the patient.      Dialysis/Infusion      No service has been selected for the patient.      Home Medical Care      No service has been selected for the patient.      Therapy      No service has been selected for the patient.      Community Resources      No service has been selected for the patient.             Final Discharge Disposition Code: 01 - home or self-care

## 2019-08-23 NOTE — PLAN OF CARE
Problem: Patient Care Overview  Goal: Plan of Care Review  Outcome: Ongoing (interventions implemented as appropriate)   08/23/19 9631   Coping/Psychosocial   Plan of Care Reviewed With patient;sibling   Plan of Care Review   Progress improving   OTHER   Outcome Summary Pt presents with increased soreness s/p removal of nerve cath this morning. R knee AROM 0-90 limited by soreness vs inability to range. Pt perform transfers SPV to set up; bed mobility independent. Gait 180 feet with RW and SBA. Pt plans to DC home this afternoon with outpatient therapy

## 2019-08-23 NOTE — DISCHARGE SUMMARY
Patient Name: Jewels Venegas  MRN: [id number]  : 1940  DOS: 2019    Attending: Faustino Weinstein MD    Primary Care Provider: Renée Multani MD    Date of Admission:.2019  7:50 AM    Date of Discharge:  2019    Discharge Diagnosis:     Status post total right knee replacement    Primary osteoarthritis of right knee    HTN (hypertension)    HLD (hyperlipidemia)    Hypothyroid    Leukocytosis, mild, likely reactive    Acute postoperative pain    Acute blood loss anemia, mild, asymptomatic       Hospital Course  Patient is a 79 y.o. female presented for right total knee arthroplasty by Dr. Weinstein.     She underwent surgery under [facility]. She tolerated surgery well and is admitted for further medical management. Her knee has been painful for many years. She uses a walker for ambulation. She denies recent falls.    Patient was provided pain medications as needed for pain control, along with adductor canal nerve block infusion of Ropivacaine- out prior to discharge.     Adjustments were made to pain medications to optimize postop pain management. Risks and benefits of opiate medications discussed with patient.    She was seen by PT and OT and has progressed well over her stay.  She used an IS for atelectasis prophylaxis and aspirin along with mechanicals for DVT prophylaxis.  Home medications were resumed as appropriate, and labs were monitored and remained fairly stable.     With the progress she has made, she is ready for DC home today. SNF was considered, but with the progress she made she decided on home with HH.    Discussed with patient regarding plan and she shows understanding and agreement.    Patient will have HHPT following discharge.        Procedures Performed  DATE OF PROCEDURE: 19     PREOPERATIVE DIAGNOSIS:  Right knee arthritis       POSTOPERATIVE DIAGNOSIS:  Right knee arthritis     PROCEDURES PERFORMED:  Right total knee arthroplasty with [employer] components (# 6  "narrow posterior stabilized femur, # 4 tibia, 10 mm polyethylene, with 32 mm three peg patella).      SURGEON: Faustino Weinstein MD    Pertinent Test Results:    I reviewed the patient's new clinical results.   Results from last 7 days   Lab Units 19  0447 19  0427 19  0527   WBC 10*3/mm3 8.99 9.86 11.47*   HEMOGLOBIN g/dL 9.2* 9.3* 9.6*   HEMATOCRIT % 29.4* 29.5* 29.1*   PLATELETS 10*3/mm3 241 235 233     Results from last 7 days   Lab Units 19  0527 19  0814   SODIUM mmol/L 137  --    POTASSIUM mmol/L 4.6 3.7   CHLORIDE mmol/L 102  --    CO2 mmol/L 23.0  --    BUN mg/dL 21  --    CREATININE mg/dL 0.78  --    CALCIUM mg/dL 8.3*  --    GLUCOSE mg/dL 155*  --      I reviewed the patient's new imaging including images and reports.      Physical therapy: Pt presents with increased soreness s/p removal of nerve cath this morning.  R knee AROM 0-90 limited by soreness vs inability to range.  Pt perform transfers SPV to set up; bed mobility independent.  Gait 180 feet with RW and SBA.  Pt plans to DC home this afternoon with outpatient therapy    Discharge Assessment:    Vital Signs  Visit Vitals  /68 (BP Location: Left arm, Patient Position: Sitting)   Pulse 83   Temp 98.4 °F (36.9 °C) (Oral)   Resp 17   Ht 168.9 cm (66.5\")   Wt 88.5 kg (194 lb 16 oz)   SpO2 95%   Breastfeeding? No   BMI 31.01 kg/m²     Temp (24hrs), Av.6 °F (37 °C), Min:98.2 °F (36.8 °C), Max:98.9 °F (37.2 °C)      General Appearance:    Alert, cooperative, in no acute distress   Lungs:     Clear to auscultation,respirations regular, even and                   unlabored    Heart:    Regular rhythm and normal rate, normal S1 and S2   Abdomen:     Normal bowel sounds, no masses, no organomegaly, soft        non-tender, non-distended, no guarding, no rebound                 tenderness   Extremities:   Moves all extremities well, no edema, no cyanosis, no              Redness. Right knee stockinet CDI. Nerve block-out "   Pulses:   Pulses palpable and equal bilaterally   Skin:   No bleeding, bruising or rash   Neurologic:   Cranial nerves 2 - 12 grossly intact, sensation intact. Flexion and dorsiflexion intact bilateral feet.         Discharge Disposition: Home    Discharge Medications     Discharge Medications      New Medications      Instructions Start Date   docusate sodium 100 MG capsule   100 mg, Oral, 2 Times Daily PRN      HYDROcodone-acetaminophen 5-325 MG per tablet  Commonly known as:  NORCO   1 tablet, Oral, Every 4 Hours PRN         Changes to Medications      Instructions Start Date   aspirin 325 MG EC tablet  What changed:    · medication strength  · how much to take  · additional instructions   325 mg, Oral, Daily, For 1 month   Start Date:  8/24/2019     EPICERAM lotion  What changed:    · how much to take  · how to take this  · when to take this  · additional instructions   Apply twice daily to back         Continue These Medications      Instructions Start Date   acetaminophen 500 MG tablet  Commonly known as:  TYLENOL   500 mg, Oral, Every 6 Hours PRN      atorvastatin 20 MG tablet  Commonly known as:  LIPITOR   10 mg, Oral, 2 Times Weekly, MON AND THURS      B-12-SL SL   1 tablet, Sublingual, Daily      FLONASE 50 MCG/ACT nasal spray  Generic drug:  fluticasone   2 sprays, Nasal, As Needed      Krill Oil 500 MG capsule   1 tablet, Oral, Daily      levocetirizine 5 MG tablet  Commonly known as:  XYZAL   5 mg, Oral, Every Evening      levothyroxine 112 MCG tablet  Commonly known as:  SYNTHROID, LEVOTHROID   112 mcg, Oral, Daily      methocarbamol 750 MG tablet  Commonly known as:  ROBAXIN   750 mg, Oral, 2 Times Daily      multivitamin with minerals tablet tablet   1 tablet, Oral, Daily      Omega-3 1000 MG capsule   1 tablet, Oral, Daily      pyridoxine 25 MG tablet  Commonly known as:  VITAMIN B-6   25 mg, Oral, 3 Times Daily      quinapril-hydrochlorothiazide 20-25 MG per tablet  Commonly known as:   ACCURETIC   1 tablet, Oral, Daily      TURMERIC CURCUMIN PO   Oral, Daily      VITAMIN D (ERGOCALCIFEROL) PO   5,000 Units, Oral, Daily         Stop These Medications    amLODIPine 5 MG tablet  Commonly known as:  NORVASC     traMADol 50 MG tablet  Commonly known as:  ULTRAM            Discharge Diet: Regular diet    Activity at Discharge: WBAT RLE    Follow-up Appointments  Dr. Weinstein per his orders    Discharge took over 30 min.    AMITA Parekh  08/23/19  12:09 PM   I have personally performed the evaluation on this patient. My history is consistant  with above documentation . My exam finding are listed above. I have personally reviewed and discussed the above formulated discharge plan with patient and [email or web address].

## 2019-08-23 NOTE — PLAN OF CARE
Problem: Knee Arthroplasty (Total, Partial) (Adult)  Goal: Signs and Symptoms of Listed Potential Problems Will be Absent, Minimized or Managed (Knee Arthroplasty)  Outcome: Outcome(s) achieved Date Met: 08/23/19 08/23/19 1029   Goal/Outcome Evaluation   Problems Assessed (Knee Arthroplasty) all   Problems Present (Knee Arthroplasty) none

## 2019-08-23 NOTE — THERAPY TREATMENT NOTE
"Patient Name: Jewels Venegas  : 1940    MRN: [id number]                              Today's Date: 2019       Admit Date: 2019    Visit Dx:     ICD-10-CM ICD-9-CM   1. Impaired functional mobility, balance, gait, and endurance Z74.09 V49.89   2. Primary osteoarthritis of right knee M17.11 715.16   3. Impaired mobility and ADLs Z74.09 799.89     Patient Active Problem List   Diagnosis   • Post laminectomy syndrome   • S/P insertion of spinal cord stimulator   • Osteoporosis   • Lumbar stenosis with neurogenic claudication   • Polyneuropathy   • Spondylarthrosis   • Benign paroxysmal positional vertigo   • Insomnia due to medical condition   • At high risk for falls   • Physical deconditioning   • Encounter for fitting and adjustment of neuropacemaker of spinal cord   • Primary osteoarthritis of both knees   • Primary osteoarthritis of right knee   • Status post total right knee replacement   • HTN (hypertension)   • HLD (hyperlipidemia)   • Hypothyroid   • Leukocytosis, mild, likely reactive   • Acute postoperative pain   • Acute blood loss anemia, mild, asymptomatic      Past Medical History:   Diagnosis Date   • Arthritis    • Back pain    • History of kidney stones    • Hyperlipidemia     triglycerides   • Hypertension    • Osteoporosis    • Seasonal allergies    • Thyroid disease    • TIA (transient ischemic attack)     \"Possibly\"   • Urinary frequency    • Urinary urgency    • Vertigo    • Wears dentures    • Wears glasses      Past Surgical History:   Procedure Laterality Date   • APPENDECTOMY     • CARDIAC CATHETERIZATION     • CHOLECYSTECTOMY     • COLONOSCOPY     • LUMBAR LAMINECTOMY  2015    Dr. Domenico Reid, [facility] L2-5   • SPINAL CORD STIMULATOR IMPLANT N/A 10/1/2018    Procedure: SPINAL CORD STIMULATOR INSERTION PHASE 1 ;  Surgeon: Vince Olivas MD;  Location: [facility];  Service: Neurosurgery   • TONSILLECTOMY     • TOTAL KNEE ARTHROPLASTY Right " 8/20/2019    Procedure: TOTAL KNEE ARTHROPLASTY RIGHT;  Surgeon: Faustino Weinstein MD;  Location: [facility];  Service: Orthopedics     General Information     Row Name 08/23/19 0950          PT Evaluation Time/Intention    Document Type  therapy note (daily note)  -AA     Mode of Treatment  physical therapy;individual therapy  -AA     Row Name 08/23/19 0950          General Information    Patient Profile Reviewed?  yes  -AA     Existing Precautions/Restrictions  fall  -AA     Row Name 08/23/19 0950          Cognitive Assessment/Intervention- PT/OT    Orientation Status (Cognition)  oriented x 4  -AA     Row Name 08/23/19 0950          Safety Issues, Functional Mobility    Safety Issues Affecting Function (Mobility)  sequencing abilities  -AA     Impairments Affecting Function (Mobility)  pain;strength;range of motion (ROM)  -AA       User Key  (r) = Recorded By, (t) = Taken By, (c) = Cosigned By    Initials Name Provider Type    AA Michelle Meyer, PT Physical Therapist        Mobility     Row Name 08/23/19 0951          Bed Mobility Assessment/Treatment    Bed Mobility Assessment/Treatment  sit-supine;scooting/bridging  -AA     Scooting/Bridging [place] (Bed Mobility)  supervision  -AA     Sit-Supine [place] (Bed Mobility)  contact guard  -AA     Assistive Device (Bed Mobility)  bed rails  -AA     Comment (Bed Mobility)  increased time to complete  -AA     Row Name 08/23/19 0951          Transfer Assessment/Treatment    Comment (Transfers)  proper seqeuncing with increased time  -AA     Row Name 08/23/19 0951          Sit-Stand Transfer    Sit-Stand [place] (Transfers)  stand by assist;verbal cues  -AA     Assistive Device (Sit-Stand Transfers)  walker, front-wheeled  -AA     Row Name 08/23/19 0959 08/23/19 0951       Gait/Stairs Assessment/Training    47902 - Gait Training Minutes   16  -AA  --    [place] Level (Gait)  --  stand by assist;verbal cues  -AA    Assistive Device (Gait)  --  walker,  front-wheeled  -AA    Distance in Feet (Gait)  --  180  -AA    Deviations/Abnormal Patterns (Gait)  --  giuseppe decreased;right sided deviations;antalgic  -AA    Bilateral Gait Deviations  --  forward flexed posture  -AA    Comment (Gait/Stairs)  --  pt ambulated with SBA and RW with good step length, cues for R knee ROM and posture, and good safety  -AA    Row Name 08/23/19 0951          Mobility Assessment/Intervention    Extremity Weight-bearing Status  right lower extremity  -AA     Right Lower Extremity (Weight-bearing Status)  weight-bearing as tolerated (WBAT)  -AA       User Key  (r) = Recorded By, (t) = Taken By, (c) = Cosigned By    Initials Name Provider Type    AA Michelle Meyer PT Physical Therapist        Obj/Interventions     Row Name 08/23/19 0954          General ROM    GENERAL ROM COMMENTS  R knee sitting EOB AROM 0-90, limited by soreness s/p nerve cath removed  -AA     Row Name 08/23/19 0954          Therapeutic Exercise    Lower Extremity (Therapeutic Exercise)  gluteal sets;heel slides, right;LAQ (long arc quad), right;quad sets, right;SLR (straight leg raise), right  -AA     Lower Extremity Range of Motion (Therapeutic Exercise)  hip abduction/adduction, right;knee flexion/extension, right;ankle dorsiflexion/plantar flexion, bilateral  -AA     Exercise Type (Therapeutic Exercise)  AROM (active range of motion);PROM (passive range of motion)  -AA     Position (Therapeutic Exercise)  supine  -AA     Sets/Reps (Therapeutic Exercise)  15  -AA     Row Name 08/23/19 0954          Static Sitting Balance    Level of [place] (Unsupported Sitting, Static Balance)  independent  -AA     Sitting Position (Unsupported Sitting, Static Balance)  sitting on edge of bed  -AA     Time Able to Maintain Position (Unsupported Sitting, Static Balance)  more than 5 minutes  -AA     Row Name 08/23/19 0954          Dynamic Sitting Balance    Level of [place], Reaches Outside Midline (Sitting, Dynamic  Balance)  independent  -AA     Sitting Position, Reaches Outside Midline (Sitting, Dynamic Balance)  sitting on edge of bed  -AA     Row Name 08/23/19 0954          Static Standing Balance    Level of [place] (Supported Standing, Static Balance)  supervision  -AA     Time Able to Maintain Position (Supported Standing, Static Balance)  more than 5 minutes  -AA     Assistive Device Utilized (Supported Standing, Static Balance)  walker, rolling  -AA     Row Name 08/23/19 0954          Dynamic Standing Balance    Level of [place], Reaches Outside Midline (Standing, Dynamic Balance)  standby assist  -AA     Time Able to Maintain Position, Reaches Outside Midline (Standing, Dynamic Balance)  2 to 3 minutes  -AA     Assistive Device Utilized (Supported Standing, Dynamic Balance)  walker, rolling  -AA       User Key  (r) = Recorded By, (t) = Taken By, (c) = Cosigned By    Initials Name Provider Type    Michelle Farnsworth, PT Physical Therapist        Goals/Plan    No documentation.       Clinical Impression     Row Name 08/23/19 0955          Pain Scale: Numbers Pre/Post-Treatment    Pain Scale: Numbers, Pretreatment  4/10  -AA     Pain Scale: Numbers, Post-Treatment  5/10  -AA     Pain Location - Side  Right  -AA     Pain Location - Orientation  generalized  -AA     Pain Location  knee  -AA     Pre/Post Treatment Pain Comment  pt medicated during therapy  -AA     Pain Intervention(s)  Medication (See MAR);Cold applied;Ambulation/increased activity;Repositioned  -AA     Row Name 08/23/19 0957 08/23/19 0955       Plan of Care Review    Plan of Care Reviewed With  patient;sibling  -AA  patient;sibling  -AA    Row Name 08/23/19 0817 08/23/19 0437       Plan of Care Review    Plan of Care Reviewed With  patient;sibling  -MM  patient;daughter  -ST    Row Name 08/23/19 0955          Positioning and Restraints    Pre-Treatment Position  in bed  -AA     Post Treatment Position  bed  -AA     In Bed  notified nsg;supine;call  light within reach;encouraged to call for assist;with family/caregiver  -AA       User Key  (r) = Recorded By, (t) = Taken By, (c) = Cosigned By    Initials Name Provider Type    Lynn Horner, RN Registered Nurse    Rosa Elena Veliz RN Registered Nurse    Michelle Farnsworth, PT Physical Therapist        Outcome Measures     Row Name 08/23/19 0959          How much help from another person do you currently need...    Turning from your back to your side while in flat bed without using bedrails?  4  -AA     Moving from lying on back to sitting on the side of a flat bed without bedrails?  4  -AA     Moving to and from a bed to a chair (including a wheelchair)?  3  -AA     Standing up from a chair using your arms (e.g., wheelchair, bedside chair)?  3  -AA     Climbing 3-5 steps with a railing?  3  -AA     To walk in hospital room?  3  -AA     AM-PAC 6 Clicks Score (PT)  20  -AA     Row Name 08/23/19 0959          Functional Assessment    Outcome Measure Options  AM-PAC 6 Clicks Basic Mobility (PT)  -RUCHI       User Key  (r) = Recorded By, (t) = Taken By, (c) = Cosigned By    Initials Name Provider Type    Michelle Farnsworth, PT Physical Therapist        Physical Therapy Education     Title: PT OT SLP Therapies (In Progress)     Topic: Physical Therapy (Done)     Point: Mobility training (Done)     Learning Progress Summary           Patient RACHELL Bravo VU, DU,NR by RUCHI at 8/23/2019  9:57 AM    RACHELL Bravo VU,SHAISTA,NR by RUCHI at 8/22/2019  4:58 PM    RACHELL Bravo D, VIOLETTE,DU,NR by RUCHI at 8/22/2019 11:03 AM    RACHELL Bravo D,H, VU,NR by RUCHI at 8/21/2019  2:03 PM    RACHELL Bravo,HEIDI,H, VU,NR by RUCHI at 8/21/2019 11:47 AM    RACHELL Seals,HEIDI, VU,NR by ANIYA at 8/20/2019  4:19 PM    Comment:  Educated on precautions, weight bearing status, correct supine to sit t/f technique, correct sit<->stand t/f technique, correct gait mechanics, and progression of POC.   Family RACHELL Bravo D, VU,DU,NR by AA at 8/22/2019 11:03 AM    RACHELL Bravo D, H, VIOLETTE,NR by AA at 8/21/2019   2:03 PM    Eager, E,D,H, VU,NR by AA at 8/21/2019 11:47 AM    Acceptance, E,D, VU,NR by LR at 8/20/2019  4:19 PM    Comment:  Educated on precautions, weight bearing status, correct supine to sit t/f technique, correct sit<->stand t/f technique, correct gait mechanics, and progression of POC.                   Point: Home exercise program (Done)     Learning Progress Summary           Patient Eager, E, VU,DU,NR by AA at 8/23/2019  9:57 AM    Eager, E, VU,DU,NR by AA at 8/22/2019  4:58 PM    Eager, E,D, VU,DU,NR by AA at 8/22/2019 11:03 AM    Eager, E,D,H, VU,NR by AA at 8/21/2019  2:03 PM    Eager, E,D,H, VU,NR by AA at 8/21/2019 11:47 AM    Acceptance, E,D, VU,NR by LR at 8/20/2019  4:19 PM    Comment:  Educated on precautions, weight bearing status, correct supine to sit t/f technique, correct sit<->stand t/f technique, correct gait mechanics, and progression of POC.   Family Eager, E,D, VU,DU,NR by AA at 8/22/2019 11:03 AM    Eager, E,D,H, VU,NR by AA at 8/21/2019  2:03 PM    Eager, E,D,H, VU,NR by AA at 8/21/2019 11:47 AM    Acceptance, E,D, VU,NR by LR at 8/20/2019  4:19 PM    Comment:  Educated on precautions, weight bearing status, correct supine to sit t/f technique, correct sit<->stand t/f technique, correct gait mechanics, and progression of POC.                   Point: Body mechanics (Done)     Learning Progress Summary           Patient Eager, E, VU,DU,NR by AA at 8/23/2019  9:57 AM    Eager, E, VU,DU,NR by AA at 8/22/2019  4:58 PM    Eager, E,D, VU,DU,NR by AA at 8/22/2019 11:03 AM    Eager, E,D,H, VU,NR by AA at 8/21/2019  2:03 PM    RACHELL Bravo D, H, VIOLETTE,NR by AA at 8/21/2019 11:47 AM    RACHELL Seals D, VIOLETTE,NR by ANIYA at 8/20/2019  4:19 PM    Comment:  Educated on precautions, weight bearing status, correct supine to sit t/f technique, correct sit<->stand t/f technique, correct gait mechanics, and progression of POC.   Family RACHELL Bravo D, VU, DU,NR by AA at 8/22/2019 11:03 AM    RACHELL Bravo D,GEETHA, VIOLETTE,NR by AA at  8/21/2019  2:03 PM    Eager, E,D,H, VU,NR by AA at 8/21/2019 11:47 AM    Acceptance, E,D, VU,NR by LR at 8/20/2019  4:19 PM    Comment:  Educated on precautions, weight bearing status, correct supine to sit t/f technique, correct sit<->stand t/f technique, correct gait mechanics, and progression of POC.                   Point: Precautions (Done)     Learning Progress Summary           Patient Eager, E, VU,DU,NR by AA at 8/23/2019  9:57 AM    Eager, E, VU,DU,NR by AA at 8/22/2019  4:58 PM    Eager, E,D, VU,DU,NR by AA at 8/22/2019 11:03 AM    Eager, E,D,H, VU,NR by AA at 8/21/2019  2:03 PM    Eager, E,D,H, VU,NR by AA at 8/21/2019 11:47 AM    Acceptance, E,D, VU,NR by LR at 8/20/2019  4:19 PM    Comment:  Educated on precautions, weight bearing status, correct supine to sit t/f technique, correct sit<->stand t/f technique, correct gait mechanics, and progression of POC.   Family Eager, E,D, VU,DU,NR by AA at 8/22/2019 11:03 AM    Eager, E,D,H, VU,NR by AA at 8/21/2019  2:03 PM    Eager, E,D,H, VU,NR by AA at 8/21/2019 11:47 AM    Acceptance, E,D, VU,NR by LR at 8/20/2019  4:19 PM    Comment:  Educated on precautions, weight bearing status, correct supine to sit t/f technique, correct sit<->stand t/f technique, correct gait mechanics, and progression of POC.                               User Key     Initials Effective Dates Name Provider Type Discipline     06/19/15 -  Danisha Navarro, PT Physical Therapist PT    AA 04/02/18 -  Michelle Meyer, PT Physical Therapist PT              PT Recommendation and Plan     Outcome Summary/Treatment Plan (PT)  Anticipated Discharge Disposition (PT): inpatient rehabilitation facility  Plan of Care Reviewed With: patient, sibling  Progress: improving  Outcome Summary: Pt presents with increased soreness s/p removal of nerve cath this morning.  R knee AROM 0-90 limited by soreness vs inability to range.  Pt perform transfers SPV to set up; bed mobility independent.  Gait  180 feet with RW and SBA.  Pt plans to DC home this afternoon with outpatient therapy     Time Calculation:   PT Charges     Row Name 08/23/19 0959             Time Calculation    Start Time  0814  -AA      PT Received On  08/23/19  -AA      PT Goal Re-Cert Due Date  08/30/19  -AA         Time Calculation- PT    Total Timed Code Minutes- PT  39 minute(s)  -AA         Timed Charges    44816 - PT Therapeutic Exercise Minutes  10  -AA      07405 - Gait Training Minutes   16  -AA      00304 - PT Therapeutic Activity Minutes  13  -AA        User Key  (r) = Recorded By, (t) = Taken By, (c) = Cosigned By    Initials Name Provider Type    AA Michelle Meyer, PT Physical Therapist        Therapy Charges for Today     Code Description Service Date Service Provider Modifiers Qty    95629103883 HC GAIT TRAINING EA 15 MIN 8/22/2019 Michelle Meyer, PT GP 1    06623244819 HC PT THER PROC EA 15 MIN 8/22/2019 Mitch April ALVINO, PT GP 1    25468990366 HC PT THERAPEUTIC ACT EA 15 MIN 8/22/2019 Michelle Meyer, PT GP 1    28554712777 HC PT THER PROC EA 15 MIN 8/22/2019 Mitch April L, PT GP 1    79396916650 HC GAIT TRAINING EA 15 MIN 8/22/2019 Mitch April L, PT GP 1    04275382720 HC PT THER PROC EA 15 MIN 8/23/2019 Mitch April L, PT GP 1    61851542724 HC GAIT TRAINING EA 15 MIN 8/23/2019 Mitch April L, PT GP 1    16523528409 HC PT THERAPEUTIC ACT EA 15 MIN 8/23/2019 Mitch April ALVINO, PT GP 1          PT G-Codes  Outcome Measure Options: AM-PAC 6 Clicks Basic Mobility (PT)  AM-PAC 6 Clicks Score (PT): 20  AM-PAC 6 Clicks Score (OT): 19    April ALVINO Meyer PT  8/23/2019

## 2019-08-23 NOTE — PROGRESS NOTES
"      [facility] Orthopaedic Surgery Progress Note    Subjective      LOS: 3 days   Patient Care Team:  Renée Multani MD as PCP - General  Renée Multani MD as PCP - Family Medicine  Apro, Juju Pryor PA-C as Physician Assistant (Physician Assistant)  Doron Gale MD as Consulting Physician (Pain Medicine)  Domenico Reid MD as Consulting Physician (Neurosurgery)    Chief complaint: Right knee pain       Interval History:   Patient resting comfortably in bed.  She would like to go home today.  She was not approved for [facility].    Objective      Vital Signs  Temp (24hrs), Av.6 °F (37 °C), Min:98.2 °F (36.8 °C), Max:98.9 °F (37.2 °C)      /68 (BP Location: Left arm, Patient Position: Sitting)   Pulse 83   Temp 98.4 °F (36.9 °C) (Oral)   Resp 17   Ht 168.9 cm (66.5\")   Wt 88.5 kg (194 lb 16 oz)   SpO2 95%   Breastfeeding? No   BMI 31.01 kg/m²     Examination:   Examination of the right knee: The wound is clean, dry, and intact.  Ankle dorsiflexion, ankle plantar flexion, and EHL are intact.  Sensation intact in the foot to light touch.  2+ dorsalis pedis pulse.  Straight leg raise is intact.      Labs:  Results from last 7 days   Lab Units 19  0447 19  0427 19  0527   WBC 10*3/mm3 8.99 9.86 11.47*   HEMOGLOBIN g/dL 9.2* 9.3* 9.6*   HEMATOCRIT % 29.4* 29.5* 29.1*   MCV fL 99.3* 98.7* 97.3*   PLATELETS 10*3/mm3 241 235 233     PT:  Note from yesterday: Pt ambulated 250 feet with RW and CGA with cues for giuseppe, step length, and heel strike. Good sequencing during turns. CGA for transfers. Increased time for mobility due to weakness and safety. Bed mobility SPV. Good SLR RLE. Pt pending rehab placement     Results Review:     I reviewed the patient's new clinical results.    Assessment and Plan     Assessment:   Status post right total knee arthroplasty-doing well      Status post total right knee replacement    Primary osteoarthritis of right knee    HTN " (hypertension)    HLD (hyperlipidemia)    Hypothyroid    Leukocytosis, mild, likely reactive    Acute postoperative pain    Acute blood loss anemia, mild, asymptomatic       Plan for disposition: Plan for discharge to home today.  Patient was declined by [facility].  Follow-up with me in 3 weeks as planned.     Provider Department Center   9/11/2019 10:50 AM Faustino Weinstein MD [facility] ORTHOPEDIC SPORTS MEDICINE    Appt Notes: 3 WEEK POSTOP           Faustino Weinstein MD  08/23/19  9:36 AM

## 2019-08-30 ENCOUNTER — OFFICE VISIT (OUTPATIENT)
Dept: ORTHOPEDIC SURGERY | Facility: CLINIC | Age: 79
End: 2019-08-30

## 2019-08-30 ENCOUNTER — TELEPHONE (OUTPATIENT)
Dept: ORTHOPEDIC SURGERY | Facility: CLINIC | Age: 79
End: 2019-08-30

## 2019-08-30 DIAGNOSIS — Z96.651 STATUS POST TOTAL RIGHT KNEE REPLACEMENT: Primary | ICD-10-CM

## 2019-08-30 PROCEDURE — 99024 POSTOP FOLLOW-UP VISIT: CPT | Performed by: PHYSICIAN ASSISTANT

## 2019-08-30 NOTE — PROGRESS NOTES
I have reviewed the notes, assessments, and/or procedures performed by Roberta Mckeon PA-C, I concur with her documentation of Jewels Venegas.

## 2019-08-30 NOTE — PROGRESS NOTES
[facility] Orthopaedic Surgery Clinic Note    Subjective     Patient: Jewels Venegas  : 1940    Primary Care Provider: Renée Multani MD    Requesting Provider: As above    Post-op (9 days status post Right Total knee Arthroplasty 19)      History    History of Present Illness: Patient returns today 9 days status post right total knee arthroplasty with Dr. Weinstein on 2019.  Physical therapy was concerned about the erythema in the distal aspect of her incision and knee as well as going down her anterior tibia.  She reports it is been there for several days but seems to be getting worse.  It is better in the morning.  She has no increased joint pain.  No fevers chills.  No drainage.  She is here to make sure there is nothing more worrisome going on as physical therapy was concerned return for cellulitis.    Current Outpatient Medications on File Prior to Visit   Medication Sig Dispense Refill   • acetaminophen (TYLENOL) 500 MG tablet Take 500 mg by mouth Every 6 (Six) Hours As Needed for Mild Pain .     • aspirin  MG tablet Take 1 tablet by mouth Daily. 30 tablet 0   • atorvastatin (LIPITOR) 20 MG tablet Take 10 mg by mouth 2 (Two) Times a Week. MON AND THURS     • Cyanocobalamin (B-12-SL SL) Place 1 tablet under the tongue Daily.     • Dermatological Products, Misc. (EPICERAM) lotion Apply twice daily to back (Patient taking differently: Apply 1 application topically to the appropriate area as directed 2 (Two) Times a Day With Meals. Apply twice daily to back) 225 g 0   • docusate sodium (COLACE) 100 MG capsule Take 1 capsule by mouth 2 (Two) Times a Day As Needed for Constipation. 60 capsule 0   • fluticasone (FLONASE) 50 MCG/ACT nasal spray 2 sprays into each nostril As Needed for Rhinitis.     • HYDROcodone-acetaminophen (NORCO) 5-325 MG per tablet Take 1 tablet by mouth Every 4 (Four) Hours As Needed for Moderate Pain  for up to 7 days. 40 tablet 0   • Krill Oil 500 MG capsule Take 1  "tablet by mouth Daily.     • levocetirizine (XYZAL) 5 MG tablet Take 5 mg by mouth Every Evening.     • levothyroxine (SYNTHROID, LEVOTHROID) 112 MCG tablet Take 112 mcg by mouth Daily.     • methocarbamol (ROBAXIN) 750 MG tablet Take 1 tablet by mouth 2 (Two) Times a Day. 60 tablet 5   • Multiple Vitamins-Minerals (MULTIVITAMIN WITH MINERALS) tablet tablet Take 1 tablet by mouth Daily.     • Omega-3 1000 MG capsule Take 1 tablet by mouth Daily.     • pyridoxine (VITAMIN B-6) 25 MG tablet Take 1 tablet by mouth 3 (Three) Times a Day. 90 tablet 5   • quinapril-hydrochlorothiazide (ACCURETIC) 20-25 MG per tablet Take 1 tablet by mouth Daily.     • TURMERIC CURCUMIN PO Take  by mouth Daily.     • VITAMIN D, ERGOCALCIFEROL, PO Take 5,000 Units by mouth Daily.       No current facility-administered medications on file prior to visit.       Allergies   Allergen Reactions   • Betadine [Povidone Iodine] Rash     Itching, and blisters   • Adhesive Tape Rash     Plastic & cloth, can tolerate paper tape   • Codeine Itching   • Influenza Vaccines Other (See Comments) and Myalgia     fever      Past Medical History:   Diagnosis Date   • Arthritis    • Back pain    • History of kidney stones    • Hyperlipidemia     triglycerides   • Hypertension    • Osteoporosis    • Seasonal allergies    • Thyroid disease    • TIA (transient ischemic attack)     \"Possibly\"   • Urinary frequency    • Urinary urgency    • Vertigo    • Wears dentures    • Wears glasses      Past Surgical History:   Procedure Laterality Date   • APPENDECTOMY  1977   • CARDIAC CATHETERIZATION     • CHOLECYSTECTOMY     • COLONOSCOPY     • LUMBAR LAMINECTOMY  06/2015    Dr. Domenico Reid, [facility] L2-5   • SPINAL CORD STIMULATOR IMPLANT N/A 10/1/2018    Procedure: SPINAL CORD STIMULATOR INSERTION PHASE 1 ;  Surgeon: Vince Olivas MD;  Location: [facility];  Service: Neurosurgery   • TONSILLECTOMY     • TOTAL KNEE ARTHROPLASTY Right 8/20/2019    " Procedure: TOTAL KNEE ARTHROPLASTY RIGHT;  Surgeon: Faustino Weinstein MD;  Location: [facility];  Service: Orthopedics     Family History   Problem Relation Age of Onset   • Cancer Father    • Heart disease Father    • Breast cancer Neg Hx       Social History     Socioeconomic History   • Marital status:      Spouse name: Not on file   • Number of children: Not on file   • Years of education: Not on file   • Highest education level: Not on file   Tobacco Use   • Smoking status: Former Smoker     Years: 20.00     Types: Cigarettes     Last attempt to quit: 1980     Years since quittin.6   • Smokeless tobacco: Never Used   • Tobacco comment: Smoking 4 (!!) PPD when patient quit    Substance and Sexual Activity   • Alcohol use: No   • Drug use: No   • Sexual activity: Defer        Review of Systems   Constitutional: Negative.    HENT: Negative.    Eyes: Negative.    Respiratory: Negative.    Cardiovascular: Negative.    Gastrointestinal: Negative.    Endocrine: Negative.    Genitourinary: Negative.    Musculoskeletal: Positive for arthralgias and joint swelling.   Skin: Negative.    Allergic/Immunologic: Negative.    Neurological: Negative.    Hematological: Negative.    Psychiatric/Behavioral: Negative.        The following portions of the patient's history were reviewed and updated as appropriate: allergies, current medications, past family history, past medical history, past social history, past surgical history and problem list.      Objective      Physical Exam  There were no vitals taken for this visit.    There is no height or weight on file to calculate BMI.    Ortho Exam  Right knee exam:  Anterior incision is healing well with intact Prineo  Erythema at distal incision and surrounding the incision as well as down the anterior tibia.  Redness in the knee completely resolves with elevation and near complete resolution in the anterior tibia.  Patient has ecchymosis in the proximal thigh, posterior  knee and calf 1+ edema in the lower extremity  Range of motion 5-90   Ligament stable to valgus and varus stress   Ambulating with a walker     medical Decision Making    Data Review:   none    Assessment:  No diagnosis found.    Plan:  Doing well status post right total knee arthroplasty with Dr. Weinstein on 8/20/2019.  Patient's erythema in the knee and leg nearly resolves with elevation.  I reassured the patient I do not think this is cellulitis but dependent rubor secondary to edema.  We discussed the need of elevating the leg above the heart is much as possible..  I also recommend that she is compression stockings if she is able to tolerate on her lower leg given the ecchymosis.  She should continue with her physical therapy.  I encouraged her to continue icing the knee as well.  She will return as scheduled for her first postop appointment or sooner should she have any further concerns.      Roberta Mckeon PA-C  08/30/19  3:20 PM

## 2019-09-11 ENCOUNTER — OFFICE VISIT (OUTPATIENT)
Dept: ORTHOPEDIC SURGERY | Facility: CLINIC | Age: 79
End: 2019-09-11

## 2019-09-11 DIAGNOSIS — Z47.89 ORTHOPEDIC AFTERCARE: ICD-10-CM

## 2019-09-11 DIAGNOSIS — Z96.651 STATUS POST TOTAL RIGHT KNEE REPLACEMENT: Primary | ICD-10-CM

## 2019-09-11 PROCEDURE — 99024 POSTOP FOLLOW-UP VISIT: CPT | Performed by: ORTHOPAEDIC SURGERY

## 2019-09-11 NOTE — PROGRESS NOTES
"    [facility] Orthopaedic Surgery Clinic Note    Subjective     Chief Complaint   Patient presents with   • Post-op     3 weeks s/p (R) TKA 08/20/2019        HPI    Jewels Venegas is a 79 y.o. female.  She follows up today for her right total knee arthroplasty.  She is doing well today, ambulating with the aid of a walker.  She is pleased with results so far.      Patient Active Problem List   Diagnosis   • Post laminectomy syndrome   • S/P insertion of spinal cord stimulator   • Osteoporosis   • Lumbar stenosis with neurogenic claudication   • Polyneuropathy   • Spondylarthrosis   • Benign paroxysmal positional vertigo   • Insomnia due to medical condition   • At high risk for falls   • Physical deconditioning   • Encounter for fitting and adjustment of neuropacemaker of spinal cord   • Primary osteoarthritis of both knees   • Primary osteoarthritis of right knee   • Status post total right knee replacement   • HTN (hypertension)   • HLD (hyperlipidemia)   • Hypothyroid   • Leukocytosis, mild, likely reactive   • Acute postoperative pain   • Acute blood loss anemia, mild, asymptomatic      Past Medical History:   Diagnosis Date   • Arthritis    • Back pain    • History of kidney stones    • Hyperlipidemia     triglycerides   • Hypertension    • Osteoporosis    • Seasonal allergies    • Thyroid disease    • TIA (transient ischemic attack)     \"Possibly\"   • Urinary frequency    • Urinary urgency    • Vertigo    • Wears dentures    • Wears glasses       Past Surgical History:   Procedure Laterality Date   • APPENDECTOMY  1977   • CARDIAC CATHETERIZATION     • CHOLECYSTECTOMY     • COLONOSCOPY     • LUMBAR LAMINECTOMY  06/2015    Dr. Domenico Reid, [facility] L2-5   • SPINAL CORD STIMULATOR IMPLANT N/A 10/1/2018    Procedure: SPINAL CORD STIMULATOR INSERTION PHASE 1 ;  Surgeon: Vince Olivas MD;  Location: [facility];  Service: Neurosurgery   • TONSILLECTOMY     • TOTAL KNEE ARTHROPLASTY Right " 2019    Procedure: TOTAL KNEE ARTHROPLASTY RIGHT;  Surgeon: Faustino Weinstein MD;  Location: [facility];  Service: Orthopedics      Family History   Problem Relation Age of Onset   • Cancer Father    • Heart disease Father    • Breast cancer Neg Hx      Social History     Socioeconomic History   • Marital status:      Spouse name: Not on file   • Number of children: Not on file   • Years of education: Not on file   • Highest education level: Not on file   Tobacco Use   • Smoking status: Former Smoker     Years: 20.00     Types: Cigarettes     Last attempt to quit: 1980     Years since quittin.7   • Smokeless tobacco: Never Used   • Tobacco comment: Smoking 4 (!!) PPD when patient quit    Substance and Sexual Activity   • Alcohol use: No   • Drug use: No   • Sexual activity: Defer      Current Outpatient Medications on File Prior to Visit   Medication Sig Dispense Refill   • acetaminophen (TYLENOL) 500 MG tablet Take 500 mg by mouth Every 6 (Six) Hours As Needed for Mild Pain .     • aspirin  MG tablet Take 1 tablet by mouth Daily. 30 tablet 0   • atorvastatin (LIPITOR) 20 MG tablet Take 10 mg by mouth 2 (Two) Times a Week. MON AND THURS     • Cyanocobalamin (B-12-SL SL) Place 1 tablet under the tongue Daily.     • Dermatological Products, Misc. (EPICERAM) lotion Apply twice daily to back (Patient taking differently: Apply 1 application topically to the appropriate area as directed 2 (Two) Times a Day With Meals. Apply twice daily to back) 225 g 0   • docusate sodium (COLACE) 100 MG capsule Take 1 capsule by mouth 2 (Two) Times a Day As Needed for Constipation. 60 capsule 0   • fluticasone (FLONASE) 50 MCG/ACT nasal spray 2 sprays into each nostril As Needed for Rhinitis.     • Krill Oil 500 MG capsule Take 1 tablet by mouth Daily.     • levocetirizine (XYZAL) 5 MG tablet Take 5 mg by mouth Every Evening.     • levothyroxine (SYNTHROID, LEVOTHROID) 112 MCG tablet Take 112 mcg by mouth Daily.      • methocarbamol (ROBAXIN) 750 MG tablet Take 1 tablet by mouth 2 (Two) Times a Day. 60 tablet 5   • Multiple Vitamins-Minerals (MULTIVITAMIN WITH MINERALS) tablet tablet Take 1 tablet by mouth Daily.     • Omega-3 1000 MG capsule Take 1 tablet by mouth Daily.     • pyridoxine (VITAMIN B-6) 25 MG tablet Take 1 tablet by mouth 3 (Three) Times a Day. 90 tablet 5   • quinapril-hydrochlorothiazide (ACCURETIC) 20-25 MG per tablet Take 1 tablet by mouth Daily.     • TURMERIC CURCUMIN PO Take  by mouth Daily.     • VITAMIN D, ERGOCALCIFEROL, PO Take 5,000 Units by mouth Daily.       No current facility-administered medications on file prior to visit.       Allergies   Allergen Reactions   • Betadine [Povidone Iodine] Rash     Itching, and blisters   • Adhesive Tape Rash     Plastic & cloth, can tolerate paper tape   • Codeine Itching   • Influenza Vaccines Other (See Comments) and Myalgia     fever        Review of Systems   Constitutional: Negative.    HENT: Negative.    Eyes: Negative.    Respiratory: Negative.    Cardiovascular: Negative.    Gastrointestinal: Negative.    Endocrine: Negative.    Genitourinary: Negative.    Musculoskeletal: Positive for arthralgias.   Skin: Negative.    Allergic/Immunologic: Negative.    Neurological: Negative.    Hematological: Negative.    Psychiatric/Behavioral: Negative.         Objective      Physical Exam  There were no vitals taken for this visit.    There is no height or weight on file to calculate BMI.    General:   Mental Status:  Alert   Appearance: Cooperative, in no acute distress   Build and Nutrition: Well-nourished well-developed female   Orientation: Alert and oriented to person, place and time   Posture: Normal   Gait: With a walker    Integument:   Right knee: Wound is well-healed with no signs of infection    Lower Extremities:   Right Knee:    Tenderness:  None    Effusion:  None    Swelling: None    Crepitus:  None    Range of motion:   Extension: 5°       Flexion: 110°  Instability:  No varus laxity, no valgus laxity, negative anterior drawer  Deformities:  None      Imaging/Studies      Imaging Results (last 24 hours)     Procedure Component Value Units Date/Time    XR Knee 3+ View With [place] Right [[id number]] Resulted:  09/11/19 1139     Updated:  09/11/19 1139    Narrative:       Right Knee Radiographs  Indication: status-post right total knee arthroplasty  Views: AP, lateral, and sunrise views of the right knee    Comparison: no change compared to prior study, 8/20/2019    Findings:   The components are well aligned, with no signs of loosening or failure.          Assessment and Plan     Jewels was seen today for post-op.    Diagnoses and all orders for this visit:    Status post total right knee replacement  -     XR Knee 3+ View With [place] Right    Orthopedic aftercare        1. Status post total right knee replacement    2. Orthopedic aftercare        I reviewed my findings with the patient today.  Her right total knee arthroplasty is functioning well, and she is pleased with results of far.  She will continue with rehabilitation, and I will see her back in 6 to 8 weeks, no x-rays are required.  I will see her back sooner for any problems.    Return in about 6 weeks (around 10/23/2019).      Medical Decision Making  Management Options : physical/occupational therapy  Data/Risk: radiology tests and independent visualization of imaging, lab tests, or EMG/NCV      Faustino Weinstein MD  09/11/19  11:54 AM

## 2019-10-30 ENCOUNTER — OFFICE VISIT (OUTPATIENT)
Dept: ORTHOPEDIC SURGERY | Facility: CLINIC | Age: 79
End: 2019-10-30

## 2019-10-30 DIAGNOSIS — Z96.651 STATUS POST TOTAL RIGHT KNEE REPLACEMENT: Primary | ICD-10-CM

## 2019-10-30 DIAGNOSIS — M17.12 PRIMARY OSTEOARTHRITIS OF LEFT KNEE: ICD-10-CM

## 2019-10-30 DIAGNOSIS — Z47.89 ORTHOPEDIC AFTERCARE: ICD-10-CM

## 2019-10-30 PROCEDURE — 99024 POSTOP FOLLOW-UP VISIT: CPT | Performed by: ORTHOPAEDIC SURGERY

## 2019-10-30 RX ORDER — PREGABALIN 150 MG/1
150 CAPSULE ORAL ONCE
Status: CANCELLED | OUTPATIENT
Start: 2019-10-30 | End: 2019-10-30

## 2019-10-30 RX ORDER — TRAMADOL HYDROCHLORIDE 50 MG/1
TABLET ORAL AS NEEDED
COMMUNITY
Start: 2019-10-07 | End: 2020-01-03 | Stop reason: HOSPADM

## 2019-10-30 RX ORDER — MELOXICAM 7.5 MG/1
15 TABLET ORAL ONCE
Status: CANCELLED | OUTPATIENT
Start: 2019-10-30 | End: 2019-10-30

## 2019-10-30 RX ORDER — AMLODIPINE BESYLATE 5 MG/1
5 TABLET ORAL DAILY
COMMUNITY
Start: 2019-10-07

## 2019-10-30 RX ORDER — ACETAMINOPHEN 325 MG/1
1000 TABLET ORAL ONCE
Status: CANCELLED | OUTPATIENT
Start: 2019-10-30 | End: 2019-10-30

## 2019-10-30 NOTE — PROGRESS NOTES
"    [facility] Note    Subjective     Chief Complaint   Patient presents with   • Post-op     7 week follow; 10 weeks s/p (R) TKA 08/20/2019        HPI    Jewels Venegas is a 79 y.o. female.  She follows up today for her right total knee arthroplasty.  She is doing well today, with 95% relief compared to preoperative symptoms.  She is pleased with the results.    However, her left knee is bothering her to the point where she would like to proceed with knee replacement surgery at the first part of the year.  Pain is severe, worse with walking, standing and climbing stairs.  She uses a walker.  Previous injections without long-term benefit.      Patient Active Problem List   Diagnosis   • Post laminectomy syndrome   • S/P insertion of spinal cord stimulator   • Osteoporosis   • Lumbar stenosis with neurogenic claudication   • Polyneuropathy   • Spondylarthrosis   • Benign paroxysmal positional vertigo   • Insomnia due to medical condition   • At high risk for falls   • Physical deconditioning   • Encounter for fitting and adjustment of neuropacemaker of spinal cord   • Primary osteoarthritis of both knees   • Primary osteoarthritis of right knee   • Status post total right knee replacement   • HTN (hypertension)   • HLD (hyperlipidemia)   • Hypothyroid   • Leukocytosis, mild, likely reactive   • Acute postoperative pain   • Acute blood loss anemia, mild, asymptomatic      Past Medical History:   Diagnosis Date   • Arthritis    • Back pain    • History of kidney stones    • Hyperlipidemia     triglycerides   • Hypertension    • Osteoporosis    • Seasonal allergies    • Thyroid disease    • TIA (transient ischemic attack)     \"Possibly\"   • Urinary frequency    • Urinary urgency    • Vertigo    • Wears dentures    • Wears glasses       Past Surgical History:   Procedure Laterality Date   • APPENDECTOMY  1977   • CARDIAC CATHETERIZATION     • CHOLECYSTECTOMY     • COLONOSCOPY     • LUMBAR " LAMINECTOMY  2015    [facility]   • SPINAL CORD STIMULATOR IMPLANT N/A 10/1/2018    Procedure: SPINAL CORD STIMULATOR INSERTION PHASE 1 ;  Surgeon: Vince Olivas MD;  Location:  [facility];  Service: Neurosurgery   • TONSILLECTOMY     • TOTAL KNEE ARTHROPLASTY Right 2019    Procedure: TOTAL KNEE ARTHROPLASTY RIGHT;  Surgeon: Faustino Weinstein MD;  Location:  [facility];  Service: Orthopedics      Family History   Problem Relation Age of Onset   • Cancer Father    • Heart disease Father    • Breast cancer Neg Hx      Social History     Socioeconomic History   • Marital status:      Spouse name: Not on file   • Number of children: Not on file   • Years of education: Not on file   • Highest education level: Not on file   Tobacco Use   • Smoking status: Former Smoker     Years: 20.00     Types: Cigarettes     Last attempt to quit: 1980     Years since quittin.8   • Smokeless tobacco: Never Used   • Tobacco comment: Smoking 4 (!!) PPD when patient quit    Substance and Sexual Activity   • Alcohol use: No   • Drug use: No   • Sexual activity: Defer      Current Outpatient Medications on File Prior to Visit   Medication Sig Dispense Refill   • acetaminophen (TYLENOL) 500 MG tablet Take 500 mg by mouth Every 6 (Six) Hours As Needed for Mild Pain .     • amLODIPine (NORVASC) 5 MG tablet Take  by mouth Daily.     • aspirin  MG tablet Take 1 tablet by mouth Daily. 30 tablet 0   • atorvastatin (LIPITOR) 20 MG tablet Take 10 mg by mouth 2 (Two) Times a Week. MON AND THURS     • Cyanocobalamin (B-12-SL SL) Place 1 tablet under the tongue Daily.     • Dermatological Products, Misc. (EPICERAM) lotion Apply twice daily to back (Patient taking differently: Apply 1 application topically to the appropriate area as directed 2 (Two) Times a Day With Meals. Apply twice daily to back) 225 g 0   • docusate sodium (COLACE) 100 MG capsule Take 1 capsule by mouth 2 (Two) Times  a Day As Needed for Constipation. 60 capsule 0   • fluticasone (FLONASE) 50 MCG/ACT nasal spray 2 sprays into each nostril As Needed for Rhinitis.     • HYDROCHLOROTHIAZIDE PO Take 10 mg by mouth Daily.     • Krill Oil 500 MG capsule Take 1 tablet by mouth Daily.     • levocetirizine (XYZAL) 5 MG tablet Take 5 mg by mouth Every Evening.     • levothyroxine (SYNTHROID, LEVOTHROID) 112 MCG tablet Take 112 mcg by mouth Daily.     • methocarbamol (ROBAXIN) 750 MG tablet Take 1 tablet by mouth 2 (Two) Times a Day. 60 tablet 5   • Multiple Vitamins-Minerals (MULTIVITAMIN WITH MINERALS) tablet tablet Take 1 tablet by mouth Daily.     • Omega-3 1000 MG capsule Take 1 tablet by mouth Daily.     • pyridoxine (VITAMIN B-6) 25 MG tablet Take 1 tablet by mouth 3 (Three) Times a Day. 90 tablet 5   • traMADol (ULTRAM) 50 MG tablet As Needed.     • TURMERIC CURCUMIN PO Take  by mouth Daily.     • VITAMIN D, ERGOCALCIFEROL, PO Take 5,000 Units by mouth Daily.     • [DISCONTINUED] quinapril-hydrochlorothiazide (ACCURETIC) 20-25 MG per tablet Take 1 tablet by mouth Daily.       No current facility-administered medications on file prior to visit.       Allergies   Allergen Reactions   • Betadine [Povidone Iodine] Rash     Itching, and blisters   • Adhesive Tape Rash     Plastic & cloth, can tolerate paper tape   • Codeine Itching   • Influenza Vaccines Other (See Comments) and Myalgia     fever        Review of Systems   Constitutional: Negative.    HENT: Negative.    Eyes: Negative.    Respiratory: Negative.    Cardiovascular: Negative.    Gastrointestinal: Negative.    Endocrine: Negative.    Genitourinary: Positive for frequency and urgency.   Musculoskeletal: Positive for arthralgias.   Skin: Negative.    Allergic/Immunologic: Positive for environmental allergies.   Neurological: Negative.    Hematological: Negative.    Psychiatric/Behavioral: Negative.         Objective      Physical Exam  There were no vitals taken for this  visit.    There is no height or weight on file to calculate BMI.    General:   Mental Status:  Alert   Appearance: Cooperative, in no acute distress   Build and Nutrition: Well-nourished well-developed female   Orientation: Alert and oriented to person, place and time   Posture: Normal   Gait: With a walker    Integument:   Right knee: Wound is well-healed with no signs of infection    Lower Extremities:   Right Knee:    Tenderness:  None    Effusion:  None    Swelling: None    Crepitus:  None    Range of motion:  Extension: 0°       Flexion: 120°  Instability:  No varus laxity, no valgus laxity, negative anterior drawer  Deformities:  None      Assessment and Plan     Jewels was seen today for post-op.    Diagnoses and all orders for this visit:    Status post total right knee replacement    Orthopedic aftercare    Primary osteoarthritis of left knee  -     Case Request; Standing  -     Instructions on coughing, deep breathing, and incentive spirometry.; Future  -     CBC and Differential; Future  -     Basic metabolic panel; Future  -     Protime-INR; Future  -     APTT; Future  -     Sedimentation rate; Future  -     C-reactive protein; Future  -     Tranexamic Acid 1,000 mg in sodium chloride 0.9 % 100 mL  -     Tranexamic Acid 1,000 mg in sodium chloride 0.9 % 100 mL  -     ceFAZolin (ANCEF) 2 g in sodium chloride 0.9 % 100 mL IVPB  -     acetaminophen (TYLENOL) tablet 975 mg  -     meloxicam (MOBIC) tablet 15 mg  -     pregabalin (LYRICA) capsule 150 mg  -     mupirocin (BACTROBAN) 2 % nasal ointment 1 application  -     Case Request    Other orders  -     Outpatient In A Bed; Standing  -     Follow Anesthesia Guidelines / Standing Orders; Future  -     Obtain informed consent  -     Provide instructions to patient regarding NPO status  -     Follow Anesthesia Guidelines / Standing Orders; Standing  -     Nerve Block; Standing  -     Verify NPO Status; Standing  -     SCD (sequential compression device)- to be  placed on patient in Pre-op; Standing  -     Clip operative site; Standing  -     Obtain informed consent (if not collected inpatient or PAT); Standing  -     Notify Physician - Standard; Standing  -     NPO After Midnight  -     mupirocin (BACTROBAN NASAL) 2 % nasal ointment; into the nostril(s) as directed by provider 2 (Two) Times a Day.  -     chlorhexidine (HIBICLENS) 4 % external liquid; Apply  topically to the appropriate area as directed Daily. Shower with hibiclens solution as directed for 5 days prior to surgery        1. Status post total right knee replacement    2. Orthopedic aftercare    3. Primary osteoarthritis of left knee        I reviewed my findings with the patient today.  Her right total knee arthroplasty is functioning well, and she is pleased with results.  I will see her back for that knee at the one-year nicole, which would be 10 months, but sooner for any problems.    However, her left knee is bothering her to the point where she would like to proceed with knee replacement surgery.  She has a known history of advanced arthritis, and has failed conservative treatment.  Please see my counseling note for details.  She would like to proceed to the first part of the year.    Surgical Counseling     I have informed the patient of the diagnosis and the prognosis.  Exhaustive conservative treatment modalities have not resulted in long term pain relief.  The symptoms have progressed to the point of daily pain and inability to perform activities of daily living without significant pain. The patient has reached the point of desiring to proceed with total knee arthroplasty after discussing the risks, benefits and alternatives to the procedure.  The surgical procedure itself was discussed in detail. Risks of the procedure were discussed, which included but are not limited to, bleeding, infection, damage to blood vessels and nerves, incomplete pain relief, loosening of the prosthesis, deep infection, need  for further surgery, loss of limb, deep venous thrombosis, pulmonary embolus, death, heart attack, stroke, kidney failure, liver failure, and anesthetic complications.  In addition, the potential for deep infection developing in the future was discussed, which could require further surgery.  The knee would have to be re-opened, debrided, and potentially remove the prosthesis, which may or may not be replaced in the future.  Also, the possibility for loosening of the prosthesis has been mentioned.  If the prosthesis loosened, a revision arthroplasty could be performed, with results that are not as predictable compared to the original procedure.  The typical rehabilitative course has also been discussed, and full recovery may take up to a year to see the maximum benefit.  The importance of patient cooperation in the rehabilitative efforts has also been discussed.  No guarantees whatsoever were given.  The patient understands the potential risks versus the benefits and desires to proceed with total knee arthroplasty at a mutually convenient time.    Return in about 10 months (around 8/30/2020) for Recheck with X-Rays, and for surgery as planned on the left knee.      Medical Decision Making  Management Options : major surgery with risk factors      Faustino Weinstein MD  10/30/19  12:17 PM

## 2019-12-20 ENCOUNTER — APPOINTMENT (OUTPATIENT)
Dept: PREADMISSION TESTING | Facility: HOSPITAL | Age: 79
End: 2019-12-20

## 2019-12-20 VITALS — WEIGHT: 193.78 LBS | BODY MASS INDEX: 30.42 KG/M2 | HEIGHT: 67 IN

## 2019-12-20 DIAGNOSIS — M17.12 PRIMARY OSTEOARTHRITIS OF LEFT KNEE: ICD-10-CM

## 2019-12-20 LAB
ANION GAP SERPL CALCULATED.3IONS-SCNC: 14 MMOL/L (ref 5–15)
APTT PPP: 31.1 SECONDS (ref 24–37)
BASOPHILS # BLD AUTO: 0.05 10*3/MM3 (ref 0–0.2)
BASOPHILS NFR BLD AUTO: 0.8 % (ref 0–1.5)
BUN BLD-MCNC: 15 MG/DL (ref 8–23)
BUN/CREAT SERPL: 26.3 (ref 7–25)
CALCIUM SPEC-SCNC: 10.2 MG/DL (ref 8.6–10.5)
CHLORIDE SERPL-SCNC: 100 MMOL/L (ref 98–107)
CO2 SERPL-SCNC: 26 MMOL/L (ref 22–29)
CREAT BLD-MCNC: 0.57 MG/DL (ref 0.57–1)
CRP SERPL-MCNC: 0.58 MG/DL (ref 0–0.5)
DEPRECATED RDW RBC AUTO: 50.4 FL (ref 37–54)
EOSINOPHIL # BLD AUTO: 0.24 10*3/MM3 (ref 0–0.4)
EOSINOPHIL NFR BLD AUTO: 4.1 % (ref 0.3–6.2)
ERYTHROCYTE [DISTWIDTH] IN BLOOD BY AUTOMATED COUNT: 14.6 % (ref 12.3–15.4)
ERYTHROCYTE [SEDIMENTATION RATE] IN BLOOD: 36 MM/HR (ref 0–30)
GFR SERPL CREATININE-BSD FRML MDRD: 103 ML/MIN/1.73
GLUCOSE BLD-MCNC: 98 MG/DL (ref 65–99)
HBA1C MFR BLD: 5.8 % (ref 4.8–5.6)
HCT VFR BLD AUTO: 42.5 % (ref 34–46.6)
HGB BLD-MCNC: 13.4 G/DL (ref 12–15.9)
IMM GRANULOCYTES # BLD AUTO: 0.02 10*3/MM3 (ref 0–0.05)
IMM GRANULOCYTES NFR BLD AUTO: 0.3 % (ref 0–0.5)
INR PPP: 0.94 (ref 0.85–1.16)
LYMPHOCYTES # BLD AUTO: 1.61 10*3/MM3 (ref 0.7–3.1)
LYMPHOCYTES NFR BLD AUTO: 27.3 % (ref 19.6–45.3)
MCH RBC QN AUTO: 29.4 PG (ref 26.6–33)
MCHC RBC AUTO-ENTMCNC: 31.5 G/DL (ref 31.5–35.7)
MCV RBC AUTO: 93.2 FL (ref 79–97)
MONOCYTES # BLD AUTO: 0.52 10*3/MM3 (ref 0.1–0.9)
MONOCYTES NFR BLD AUTO: 8.8 % (ref 5–12)
NEUTROPHILS # BLD AUTO: 3.46 10*3/MM3 (ref 1.7–7)
NEUTROPHILS NFR BLD AUTO: 58.7 % (ref 42.7–76)
NRBC BLD AUTO-RTO: 0 /100 WBC (ref 0–0.2)
PLATELET # BLD AUTO: 315 10*3/MM3 (ref 140–450)
PMV BLD AUTO: 8.8 FL (ref 6–12)
POTASSIUM BLD-SCNC: 4 MMOL/L (ref 3.5–5.2)
PROTHROMBIN TIME: 12.1 SECONDS (ref 11.2–14.3)
RBC # BLD AUTO: 4.56 10*6/MM3 (ref 3.77–5.28)
SODIUM BLD-SCNC: 140 MMOL/L (ref 136–145)
WBC NRBC COR # BLD: 5.9 10*3/MM3 (ref 3.4–10.8)

## 2019-12-20 PROCEDURE — 86140 C-REACTIVE PROTEIN: CPT | Performed by: ORTHOPAEDIC SURGERY

## 2019-12-20 PROCEDURE — 85652 RBC SED RATE AUTOMATED: CPT | Performed by: ORTHOPAEDIC SURGERY

## 2019-12-20 PROCEDURE — 83036 HEMOGLOBIN GLYCOSYLATED A1C: CPT | Performed by: ANESTHESIOLOGY

## 2019-12-20 PROCEDURE — 85610 PROTHROMBIN TIME: CPT | Performed by: ORTHOPAEDIC SURGERY

## 2019-12-20 PROCEDURE — 85025 COMPLETE CBC W/AUTO DIFF WBC: CPT | Performed by: ORTHOPAEDIC SURGERY

## 2019-12-20 PROCEDURE — 36415 COLL VENOUS BLD VENIPUNCTURE: CPT

## 2019-12-20 PROCEDURE — 85730 THROMBOPLASTIN TIME PARTIAL: CPT | Performed by: ORTHOPAEDIC SURGERY

## 2019-12-20 PROCEDURE — 80048 BASIC METABOLIC PNL TOTAL CA: CPT | Performed by: ORTHOPAEDIC SURGERY

## 2019-12-20 RX ORDER — LEVOCETIRIZINE DIHYDROCHLORIDE 5 MG/1
5 TABLET, FILM COATED ORAL EVERY EVENING
COMMUNITY

## 2019-12-20 ASSESSMENT — KOOS JR
KOOS JR SCORE: 18
KOOS JR SCORE: 39.625
KOOS JR SCORE: 19
KOOS JR SCORE: 42.281

## 2019-12-31 ENCOUNTER — TELEPHONE (OUTPATIENT)
Dept: ORTHOPEDIC SURGERY | Facility: CLINIC | Age: 79
End: 2019-12-31

## 2019-12-31 ENCOUNTER — ANESTHESIA EVENT (OUTPATIENT)
Dept: PERIOP | Facility: HOSPITAL | Age: 79
End: 2019-12-31

## 2019-12-31 NOTE — TELEPHONE ENCOUNTER
----- Message from Faustino Weinstein MD sent at 12/30/2019  4:58 PM EST -----  Should be ok    ----- Message -----  From: Angy Turner  Sent: 12/23/2019   2:37 PM EST  To: Faustino Weinstein MD    CRP 0.58  SED RATE 36

## 2020-01-02 ENCOUNTER — HOSPITAL ENCOUNTER (INPATIENT)
Facility: HOSPITAL | Age: 80
LOS: 1 days | Discharge: HOME | End: 2020-01-03
Attending: ORTHOPAEDIC SURGERY | Admitting: ORTHOPAEDIC SURGERY

## 2020-01-02 ENCOUNTER — ANESTHESIA (OUTPATIENT)
Dept: PERIOP | Facility: HOSPITAL | Age: 80
End: 2020-01-02

## 2020-01-02 ENCOUNTER — APPOINTMENT (OUTPATIENT)
Dept: GENERAL RADIOLOGY | Facility: HOSPITAL | Age: 80
End: 2020-01-02

## 2020-01-02 DIAGNOSIS — M17.12 PRIMARY OSTEOARTHRITIS OF LEFT KNEE: ICD-10-CM

## 2020-01-02 DIAGNOSIS — Z74.09 IMPAIRED FUNCTIONAL MOBILITY, BALANCE, GAIT, AND ENDURANCE: ICD-10-CM

## 2020-01-02 DIAGNOSIS — Z78.9 IMPAIRED MOBILITY AND ADLS: ICD-10-CM

## 2020-01-02 DIAGNOSIS — Z74.09 IMPAIRED MOBILITY AND ADLS: ICD-10-CM

## 2020-01-02 DIAGNOSIS — Z96.652 STATUS POST TOTAL LEFT KNEE REPLACEMENT: Primary | ICD-10-CM

## 2020-01-02 PROBLEM — R73.03 PREDIABETES: Status: ACTIVE | Noted: 2020-01-02

## 2020-01-02 LAB
GLUCOSE BLDC GLUCOMTR-MCNC: 144 MG/DL (ref 70–130)
GLUCOSE BLDC GLUCOMTR-MCNC: 153 MG/DL (ref 70–130)
GLUCOSE BLDC GLUCOMTR-MCNC: 218 MG/DL (ref 70–130)
POTASSIUM BLD-SCNC: 3.8 MMOL/L (ref 3.5–5.2)

## 2020-01-02 PROCEDURE — 25010000002 ONDANSETRON PER 1 MG: Performed by: NURSE ANESTHETIST, CERTIFIED REGISTERED

## 2020-01-02 PROCEDURE — 25010000003 CEFAZOLIN IN DEXTROSE 2-4 GM/100ML-% SOLUTION: Performed by: ORTHOPAEDIC SURGERY

## 2020-01-02 PROCEDURE — C1713 ANCHOR/SCREW BN/BN,TIS/BN: HCPCS | Performed by: ORTHOPAEDIC SURGERY

## 2020-01-02 PROCEDURE — 25010000002 PHENYLEPHRINE PER 1 ML: Performed by: NURSE ANESTHETIST, CERTIFIED REGISTERED

## 2020-01-02 PROCEDURE — 0SRD0J9 REPLACEMENT OF LEFT KNEE JOINT WITH SYNTHETIC SUBSTITUTE, CEMENTED, OPEN APPROACH: ICD-10-PCS | Performed by: ORTHOPAEDIC SURGERY

## 2020-01-02 PROCEDURE — 25010000002 HYDROMORPHONE PER 4 MG: Performed by: NURSE ANESTHETIST, CERTIFIED REGISTERED

## 2020-01-02 PROCEDURE — 27447 TOTAL KNEE ARTHROPLASTY: CPT | Performed by: ORTHOPAEDIC SURGERY

## 2020-01-02 PROCEDURE — A9270 NON-COVERED ITEM OR SERVICE: HCPCS | Performed by: ORTHOPAEDIC SURGERY

## 2020-01-02 PROCEDURE — 82962 GLUCOSE BLOOD TEST: CPT

## 2020-01-02 PROCEDURE — 73560 X-RAY EXAM OF KNEE 1 OR 2: CPT

## 2020-01-02 PROCEDURE — 97116 GAIT TRAINING THERAPY: CPT

## 2020-01-02 PROCEDURE — 25010000002 HYDROMORPHONE 1 MG/ML SOLUTION: Performed by: ORTHOPAEDIC SURGERY

## 2020-01-02 PROCEDURE — 63710000001 INSULIN LISPRO (HUMAN) PER 5 UNITS: Performed by: NURSE PRACTITIONER

## 2020-01-02 PROCEDURE — 25010000002 ONDANSETRON PER 1 MG: Performed by: ORTHOPAEDIC SURGERY

## 2020-01-02 PROCEDURE — 25010000002 DEXAMETHASONE PER 1 MG: Performed by: NURSE ANESTHETIST, CERTIFIED REGISTERED

## 2020-01-02 PROCEDURE — 25010000003 LIDOCAINE 1 % SOLUTION: Performed by: NURSE ANESTHETIST, CERTIFIED REGISTERED

## 2020-01-02 PROCEDURE — A9270 NON-COVERED ITEM OR SERVICE: HCPCS | Performed by: ANESTHESIOLOGY

## 2020-01-02 PROCEDURE — C1776 JOINT DEVICE (IMPLANTABLE): HCPCS | Performed by: ORTHOPAEDIC SURGERY

## 2020-01-02 PROCEDURE — 25010000002 ROPIVACINE HCL-NACL: Performed by: NURSE ANESTHETIST, CERTIFIED REGISTERED

## 2020-01-02 PROCEDURE — 25010000002 PROPOFOL 10 MG/ML EMULSION: Performed by: NURSE ANESTHETIST, CERTIFIED REGISTERED

## 2020-01-02 PROCEDURE — 63710000001 PREGABALIN 150 MG CAPSULE: Performed by: ORTHOPAEDIC SURGERY

## 2020-01-02 PROCEDURE — 63710000001 MUPIROCIN 2 % OINTMENT: Performed by: ORTHOPAEDIC SURGERY

## 2020-01-02 PROCEDURE — 84132 ASSAY OF SERUM POTASSIUM: CPT | Performed by: ORTHOPAEDIC SURGERY

## 2020-01-02 PROCEDURE — 63710000001 FAMOTIDINE 20 MG TABLET: Performed by: ANESTHESIOLOGY

## 2020-01-02 PROCEDURE — 25010000002 ROPIVACAINE PER 1 MG: Performed by: ORTHOPAEDIC SURGERY

## 2020-01-02 PROCEDURE — 25010000002 FENTANYL CITRATE (PF) 100 MCG/2ML SOLUTION: Performed by: NURSE ANESTHETIST, CERTIFIED REGISTERED

## 2020-01-02 PROCEDURE — 63710000001 ACETAMINOPHEN 500 MG TABLET: Performed by: ORTHOPAEDIC SURGERY

## 2020-01-02 PROCEDURE — 25010000002 PROCHLORPERAZINE 10 MG/2ML SOLUTION: Performed by: NURSE PRACTITIONER

## 2020-01-02 PROCEDURE — 25010000002 NEOSTIGMINE 10 MG/10ML SOLUTION: Performed by: NURSE ANESTHETIST, CERTIFIED REGISTERED

## 2020-01-02 PROCEDURE — 97162 PT EVAL MOD COMPLEX 30 MIN: CPT

## 2020-01-02 PROCEDURE — 63710000001 MELOXICAM 15 MG TABLET: Performed by: ORTHOPAEDIC SURGERY

## 2020-01-02 DEVICE — GENESIS II RESURFACING PATELLAR                                    PROSTHESIS  32MM
Type: IMPLANTABLE DEVICE | Site: KNEE | Status: FUNCTIONAL
Brand: GENESIS II

## 2020-01-02 DEVICE — CMT BONE SIMPLEX/P FULL DOSE 10/PK: Type: IMPLANTABLE DEVICE | Site: KNEE | Status: FUNCTIONAL

## 2020-01-02 DEVICE — IMPLANTABLE DEVICE: Type: IMPLANTABLE DEVICE | Site: KNEE | Status: FUNCTIONAL

## 2020-01-02 DEVICE — GENESIS II NON-POROUS TIBIAL                                    BASEPLATE SIZE 4 LEFT
Type: IMPLANTABLE DEVICE | Site: KNEE | Status: FUNCTIONAL
Brand: GENESIS II

## 2020-01-02 DEVICE — LEGION NARROW POSTERIOR STABILIZED                                    OXINIUM SIZE 6N LEFT
Type: IMPLANTABLE DEVICE | Site: KNEE | Status: FUNCTIONAL
Brand: LEGION

## 2020-01-02 DEVICE — LEGION PS HIGH FLEX XLPE SZ 3-4 10MM
Type: IMPLANTABLE DEVICE | Site: KNEE | Status: FUNCTIONAL
Brand: LEGION

## 2020-01-02 RX ORDER — FAMOTIDINE 20 MG/1
20 TABLET, FILM COATED ORAL ONCE
Status: COMPLETED | OUTPATIENT
Start: 2020-01-02 | End: 2020-01-02

## 2020-01-02 RX ORDER — PROPOFOL 10 MG/ML
VIAL (ML) INTRAVENOUS AS NEEDED
Status: DISCONTINUED | OUTPATIENT
Start: 2020-01-02 | End: 2020-01-02 | Stop reason: SURG

## 2020-01-02 RX ORDER — PROCHLORPERAZINE EDISYLATE 5 MG/ML
5 INJECTION INTRAMUSCULAR; INTRAVENOUS EVERY 6 HOURS PRN
Status: DISCONTINUED | OUTPATIENT
Start: 2020-01-02 | End: 2020-01-03 | Stop reason: HOSPADM

## 2020-01-02 RX ORDER — MELOXICAM 7.5 MG/1
15 TABLET ORAL DAILY
Status: DISCONTINUED | OUTPATIENT
Start: 2020-01-03 | End: 2020-01-03 | Stop reason: HOSPADM

## 2020-01-02 RX ORDER — LIDOCAINE HYDROCHLORIDE 10 MG/ML
0.5 INJECTION, SOLUTION EPIDURAL; INFILTRATION; INTRACAUDAL; PERINEURAL ONCE AS NEEDED
Status: COMPLETED | OUTPATIENT
Start: 2020-01-02 | End: 2020-01-02

## 2020-01-02 RX ORDER — AMLODIPINE BESYLATE 5 MG/1
5 TABLET ORAL DAILY
Status: DISCONTINUED | OUTPATIENT
Start: 2020-01-02 | End: 2020-01-03 | Stop reason: HOSPADM

## 2020-01-02 RX ORDER — CEFAZOLIN SODIUM 2 G/100ML
2 INJECTION, SOLUTION INTRAVENOUS ONCE
Status: COMPLETED | OUTPATIENT
Start: 2020-01-02 | End: 2020-01-02

## 2020-01-02 RX ORDER — BUPIVACAINE HYDROCHLORIDE 2.5 MG/ML
INJECTION, SOLUTION EPIDURAL; INFILTRATION; INTRACAUDAL
Status: DISCONTINUED | OUTPATIENT
Start: 2020-01-02 | End: 2020-01-02 | Stop reason: SURG

## 2020-01-02 RX ORDER — MORPHINE SULFATE 4 MG/ML
4 INJECTION, SOLUTION INTRAMUSCULAR; INTRAVENOUS
Status: DISCONTINUED | OUTPATIENT
Start: 2020-01-02 | End: 2020-01-03 | Stop reason: HOSPADM

## 2020-01-02 RX ORDER — PROCHLORPERAZINE 25 MG
25 SUPPOSITORY, RECTAL RECTAL EVERY 12 HOURS PRN
Status: DISCONTINUED | OUTPATIENT
Start: 2020-01-02 | End: 2020-01-03 | Stop reason: HOSPADM

## 2020-01-02 RX ORDER — CEFAZOLIN SODIUM 2 G/100ML
2 INJECTION, SOLUTION INTRAVENOUS EVERY 8 HOURS
Status: COMPLETED | OUTPATIENT
Start: 2020-01-02 | End: 2020-01-03

## 2020-01-02 RX ORDER — HYDROCODONE BITARTRATE AND ACETAMINOPHEN 5; 325 MG/1; MG/1
1 TABLET ORAL EVERY 4 HOURS PRN
Qty: 40 TABLET | Refills: 0 | Status: SHIPPED | OUTPATIENT
Start: 2020-01-02 | End: 2020-01-12

## 2020-01-02 RX ORDER — SODIUM CHLORIDE 0.9 % (FLUSH) 0.9 %
10 SYRINGE (ML) INJECTION EVERY 12 HOURS SCHEDULED
Status: DISCONTINUED | OUTPATIENT
Start: 2020-01-02 | End: 2020-01-02 | Stop reason: HOSPADM

## 2020-01-02 RX ORDER — MAGNESIUM HYDROXIDE 1200 MG/15ML
LIQUID ORAL AS NEEDED
Status: DISCONTINUED | OUTPATIENT
Start: 2020-01-02 | End: 2020-01-02 | Stop reason: HOSPADM

## 2020-01-02 RX ORDER — PREGABALIN 150 MG/1
150 CAPSULE ORAL ONCE
Status: COMPLETED | OUTPATIENT
Start: 2020-01-02 | End: 2020-01-02

## 2020-01-02 RX ORDER — METHOCARBAMOL 750 MG/1
750 TABLET, FILM COATED ORAL 2 TIMES DAILY
Status: DISCONTINUED | OUTPATIENT
Start: 2020-01-02 | End: 2020-01-03 | Stop reason: HOSPADM

## 2020-01-02 RX ORDER — ESMOLOL HYDROCHLORIDE 10 MG/ML
INJECTION INTRAVENOUS AS NEEDED
Status: DISCONTINUED | OUTPATIENT
Start: 2020-01-02 | End: 2020-01-02 | Stop reason: SURG

## 2020-01-02 RX ORDER — LEVOTHYROXINE SODIUM 112 UG/1
112 TABLET ORAL
Status: DISCONTINUED | OUTPATIENT
Start: 2020-01-02 | End: 2020-01-03 | Stop reason: HOSPADM

## 2020-01-02 RX ORDER — SODIUM CHLORIDE 0.9 % (FLUSH) 0.9 %
10 SYRINGE (ML) INJECTION AS NEEDED
Status: DISCONTINUED | OUTPATIENT
Start: 2020-01-02 | End: 2020-01-02 | Stop reason: HOSPADM

## 2020-01-02 RX ORDER — MELOXICAM 15 MG/1
15 TABLET ORAL ONCE
Status: COMPLETED | OUTPATIENT
Start: 2020-01-02 | End: 2020-01-02

## 2020-01-02 RX ORDER — LABETALOL HYDROCHLORIDE 5 MG/ML
5 INJECTION, SOLUTION INTRAVENOUS
Status: DISCONTINUED | OUTPATIENT
Start: 2020-01-02 | End: 2020-01-02 | Stop reason: HOSPADM

## 2020-01-02 RX ORDER — GLYCOPYRROLATE 0.2 MG/ML
INJECTION INTRAMUSCULAR; INTRAVENOUS AS NEEDED
Status: DISCONTINUED | OUTPATIENT
Start: 2020-01-02 | End: 2020-01-02 | Stop reason: SURG

## 2020-01-02 RX ORDER — DEXAMETHASONE SODIUM PHOSPHATE 4 MG/ML
INJECTION, SOLUTION INTRA-ARTICULAR; INTRALESIONAL; INTRAMUSCULAR; INTRAVENOUS; SOFT TISSUE AS NEEDED
Status: DISCONTINUED | OUTPATIENT
Start: 2020-01-02 | End: 2020-01-02 | Stop reason: SURG

## 2020-01-02 RX ORDER — BISACODYL 5 MG/1
10 TABLET, DELAYED RELEASE ORAL DAILY PRN
Status: DISCONTINUED | OUTPATIENT
Start: 2020-01-02 | End: 2020-01-03 | Stop reason: HOSPADM

## 2020-01-02 RX ORDER — ONDANSETRON 2 MG/ML
INJECTION INTRAMUSCULAR; INTRAVENOUS AS NEEDED
Status: DISCONTINUED | OUTPATIENT
Start: 2020-01-02 | End: 2020-01-02 | Stop reason: SURG

## 2020-01-02 RX ORDER — FENTANYL CITRATE 50 UG/ML
25 INJECTION, SOLUTION INTRAMUSCULAR; INTRAVENOUS
Status: DISCONTINUED | OUTPATIENT
Start: 2020-01-02 | End: 2020-01-02 | Stop reason: HOSPADM

## 2020-01-02 RX ORDER — SODIUM CHLORIDE 9 MG/ML
120 INJECTION, SOLUTION INTRAVENOUS CONTINUOUS
Status: DISCONTINUED | OUTPATIENT
Start: 2020-01-02 | End: 2020-01-03 | Stop reason: HOSPADM

## 2020-01-02 RX ORDER — LABETALOL HYDROCHLORIDE 5 MG/ML
10 INJECTION, SOLUTION INTRAVENOUS EVERY 4 HOURS PRN
Status: DISCONTINUED | OUTPATIENT
Start: 2020-01-02 | End: 2020-01-03 | Stop reason: HOSPADM

## 2020-01-02 RX ORDER — NALOXONE HCL 0.4 MG/ML
0.4 VIAL (ML) INJECTION
Status: DISCONTINUED | OUTPATIENT
Start: 2020-01-02 | End: 2020-01-03 | Stop reason: HOSPADM

## 2020-01-02 RX ORDER — HYDRALAZINE HYDROCHLORIDE 20 MG/ML
5 INJECTION INTRAMUSCULAR; INTRAVENOUS
Status: DISCONTINUED | OUTPATIENT
Start: 2020-01-02 | End: 2020-01-02 | Stop reason: HOSPADM

## 2020-01-02 RX ORDER — BUPIVACAINE HYDROCHLORIDE 2.5 MG/ML
INJECTION, SOLUTION EPIDURAL; INFILTRATION; INTRACAUDAL
Status: COMPLETED | OUTPATIENT
Start: 2020-01-02 | End: 2020-01-02

## 2020-01-02 RX ORDER — BISACODYL 10 MG
10 SUPPOSITORY, RECTAL RECTAL DAILY PRN
Status: DISCONTINUED | OUTPATIENT
Start: 2020-01-02 | End: 2020-01-03 | Stop reason: HOSPADM

## 2020-01-02 RX ORDER — ACETAMINOPHEN 500 MG
1000 TABLET ORAL ONCE
Status: COMPLETED | OUTPATIENT
Start: 2020-01-02 | End: 2020-01-02

## 2020-01-02 RX ORDER — DEXTROSE MONOHYDRATE 25 G/50ML
25 INJECTION, SOLUTION INTRAVENOUS
Status: DISCONTINUED | OUTPATIENT
Start: 2020-01-02 | End: 2020-01-03 | Stop reason: HOSPADM

## 2020-01-02 RX ORDER — ASPIRIN 325 MG
325 TABLET, DELAYED RELEASE (ENTERIC COATED) ORAL DAILY
Status: DISCONTINUED | OUTPATIENT
Start: 2020-01-03 | End: 2020-01-03 | Stop reason: HOSPADM

## 2020-01-02 RX ORDER — ONDANSETRON 2 MG/ML
4 INJECTION INTRAMUSCULAR; INTRAVENOUS EVERY 6 HOURS PRN
Status: DISCONTINUED | OUTPATIENT
Start: 2020-01-02 | End: 2020-01-03 | Stop reason: HOSPADM

## 2020-01-02 RX ORDER — SODIUM CHLORIDE 0.9 % (FLUSH) 0.9 %
3 SYRINGE (ML) INJECTION EVERY 12 HOURS SCHEDULED
Status: DISCONTINUED | OUTPATIENT
Start: 2020-01-02 | End: 2020-01-03 | Stop reason: HOSPADM

## 2020-01-02 RX ORDER — DOCUSATE SODIUM 100 MG/1
100 CAPSULE, LIQUID FILLED ORAL 2 TIMES DAILY PRN
Status: DISCONTINUED | OUTPATIENT
Start: 2020-01-02 | End: 2020-01-03 | Stop reason: HOSPADM

## 2020-01-02 RX ORDER — FAMOTIDINE 10 MG/ML
20 INJECTION, SOLUTION INTRAVENOUS ONCE
Status: DISCONTINUED | OUTPATIENT
Start: 2020-01-02 | End: 2020-01-02 | Stop reason: HOSPADM

## 2020-01-02 RX ORDER — FENTANYL CITRATE 50 UG/ML
INJECTION, SOLUTION INTRAMUSCULAR; INTRAVENOUS AS NEEDED
Status: DISCONTINUED | OUTPATIENT
Start: 2020-01-02 | End: 2020-01-02 | Stop reason: SURG

## 2020-01-02 RX ORDER — PROCHLORPERAZINE MALEATE 5 MG/1
5 TABLET ORAL EVERY 6 HOURS PRN
Status: DISCONTINUED | OUTPATIENT
Start: 2020-01-02 | End: 2020-01-03 | Stop reason: HOSPADM

## 2020-01-02 RX ORDER — ROCURONIUM BROMIDE 10 MG/ML
INJECTION, SOLUTION INTRAVENOUS AS NEEDED
Status: DISCONTINUED | OUTPATIENT
Start: 2020-01-02 | End: 2020-01-02 | Stop reason: SURG

## 2020-01-02 RX ORDER — SODIUM CHLORIDE, SODIUM LACTATE, POTASSIUM CHLORIDE, CALCIUM CHLORIDE 600; 310; 30; 20 MG/100ML; MG/100ML; MG/100ML; MG/100ML
9 INJECTION, SOLUTION INTRAVENOUS CONTINUOUS
Status: DISCONTINUED | OUTPATIENT
Start: 2020-01-02 | End: 2020-01-02

## 2020-01-02 RX ORDER — SODIUM CHLORIDE 0.9 % (FLUSH) 0.9 %
1-10 SYRINGE (ML) INJECTION AS NEEDED
Status: DISCONTINUED | OUTPATIENT
Start: 2020-01-02 | End: 2020-01-03 | Stop reason: HOSPADM

## 2020-01-02 RX ORDER — ONDANSETRON 2 MG/ML
4 INJECTION INTRAMUSCULAR; INTRAVENOUS ONCE AS NEEDED
Status: DISCONTINUED | OUTPATIENT
Start: 2020-01-02 | End: 2020-01-02 | Stop reason: HOSPADM

## 2020-01-02 RX ORDER — HYDROMORPHONE HYDROCHLORIDE 1 MG/ML
0.5 INJECTION, SOLUTION INTRAMUSCULAR; INTRAVENOUS; SUBCUTANEOUS
Status: DISCONTINUED | OUTPATIENT
Start: 2020-01-02 | End: 2020-01-02 | Stop reason: HOSPADM

## 2020-01-02 RX ORDER — HYDROCODONE BITARTRATE AND ACETAMINOPHEN 5; 325 MG/1; MG/1
1 TABLET ORAL EVERY 4 HOURS PRN
Status: DISCONTINUED | OUTPATIENT
Start: 2020-01-02 | End: 2020-01-03 | Stop reason: HOSPADM

## 2020-01-02 RX ORDER — ONDANSETRON 4 MG/1
4 TABLET, FILM COATED ORAL EVERY 6 HOURS PRN
Status: DISCONTINUED | OUTPATIENT
Start: 2020-01-02 | End: 2020-01-03 | Stop reason: HOSPADM

## 2020-01-02 RX ORDER — ROPIVACAINE HYDROCHLORIDE 5 MG/ML
INJECTION, SOLUTION EPIDURAL; INFILTRATION; PERINEURAL AS NEEDED
Status: DISCONTINUED | OUTPATIENT
Start: 2020-01-02 | End: 2020-01-02 | Stop reason: HOSPADM

## 2020-01-02 RX ORDER — ACETAMINOPHEN 325 MG/1
650 TABLET ORAL EVERY 4 HOURS PRN
Status: DISCONTINUED | OUTPATIENT
Start: 2020-01-02 | End: 2020-01-03 | Stop reason: HOSPADM

## 2020-01-02 RX ORDER — NICOTINE POLACRILEX 4 MG
15 LOZENGE BUCCAL
Status: DISCONTINUED | OUTPATIENT
Start: 2020-01-02 | End: 2020-01-03 | Stop reason: HOSPADM

## 2020-01-02 RX ORDER — LIDOCAINE HYDROCHLORIDE 10 MG/ML
INJECTION, SOLUTION INFILTRATION; PERINEURAL AS NEEDED
Status: DISCONTINUED | OUTPATIENT
Start: 2020-01-02 | End: 2020-01-02 | Stop reason: SURG

## 2020-01-02 RX ORDER — ATORVASTATIN CALCIUM 10 MG/1
10 TABLET, FILM COATED ORAL 2 TIMES WEEKLY
Status: DISCONTINUED | OUTPATIENT
Start: 2020-01-02 | End: 2020-01-03 | Stop reason: HOSPADM

## 2020-01-02 RX ORDER — NEOSTIGMINE METHYLSULFATE 1 MG/ML
INJECTION, SOLUTION INTRAVENOUS AS NEEDED
Status: DISCONTINUED | OUTPATIENT
Start: 2020-01-02 | End: 2020-01-02 | Stop reason: SURG

## 2020-01-02 RX ORDER — NALOXONE HCL 0.4 MG/ML
0.1 VIAL (ML) INJECTION
Status: DISCONTINUED | OUTPATIENT
Start: 2020-01-02 | End: 2020-01-03 | Stop reason: HOSPADM

## 2020-01-02 RX ADMIN — LIDOCAINE HYDROCHLORIDE 50 MG: 10 INJECTION, SOLUTION INFILTRATION; PERINEURAL at 07:39

## 2020-01-02 RX ADMIN — CEFAZOLIN SODIUM 2 G: 2 INJECTION, SOLUTION INTRAVENOUS at 15:26

## 2020-01-02 RX ADMIN — CEFAZOLIN SODIUM 2 G: 2 INJECTION, SOLUTION INTRAVENOUS at 07:33

## 2020-01-02 RX ADMIN — FENTANYL CITRATE 25 MCG: 50 INJECTION, SOLUTION INTRAMUSCULAR; INTRAVENOUS at 08:00

## 2020-01-02 RX ADMIN — BUPIVACAINE HYDROCHLORIDE 20 ML: 2.5 INJECTION, SOLUTION EPIDURAL; INFILTRATION; INTRACAUDAL; PERINEURAL at 07:41

## 2020-01-02 RX ADMIN — ONDANSETRON 4 MG: 2 INJECTION INTRAMUSCULAR; INTRAVENOUS at 09:10

## 2020-01-02 RX ADMIN — AMLODIPINE BESYLATE 5 MG: 5 TABLET ORAL at 12:31

## 2020-01-02 RX ADMIN — PREGABALIN 150 MG: 150 CAPSULE ORAL at 06:52

## 2020-01-02 RX ADMIN — METHOCARBAMOL 750 MG: 750 TABLET ORAL at 20:23

## 2020-01-02 RX ADMIN — TRANEXAMIC ACID 1000 MG: 100 INJECTION, SOLUTION INTRAVENOUS at 07:45

## 2020-01-02 RX ADMIN — SODIUM CHLORIDE, PRESERVATIVE FREE 3 ML: 5 INJECTION INTRAVENOUS at 12:31

## 2020-01-02 RX ADMIN — FAMOTIDINE 20 MG: 20 TABLET ORAL at 06:50

## 2020-01-02 RX ADMIN — LIDOCAINE HYDROCHLORIDE 0.2 ML: 10 INJECTION, SOLUTION EPIDURAL; INFILTRATION; INTRACAUDAL; PERINEURAL at 06:50

## 2020-01-02 RX ADMIN — INSULIN LISPRO 2 UNITS: 100 INJECTION, SOLUTION INTRAVENOUS; SUBCUTANEOUS at 18:22

## 2020-01-02 RX ADMIN — MUPIROCIN 1 APPLICATION: 20 OINTMENT TOPICAL at 06:50

## 2020-01-02 RX ADMIN — MELOXICAM 15 MG: 15 TABLET ORAL at 06:52

## 2020-01-02 RX ADMIN — PHENYLEPHRINE HYDROCHLORIDE 100 MCG: 10 INJECTION INTRAVENOUS at 07:50

## 2020-01-02 RX ADMIN — SODIUM CHLORIDE 120 ML/HR: 9 INJECTION, SOLUTION INTRAVENOUS at 12:30

## 2020-01-02 RX ADMIN — FENTANYL CITRATE 25 MCG: 50 INJECTION, SOLUTION INTRAMUSCULAR; INTRAVENOUS at 07:39

## 2020-01-02 RX ADMIN — ATORVASTATIN CALCIUM 10 MG: 10 TABLET, FILM COATED ORAL at 18:24

## 2020-01-02 RX ADMIN — LEVOTHYROXINE SODIUM 112 MCG: 112 TABLET ORAL at 12:31

## 2020-01-02 RX ADMIN — GLYCOPYRROLATE 0.4 MG: 0.2 INJECTION, SOLUTION INTRAMUSCULAR; INTRAVENOUS at 09:10

## 2020-01-02 RX ADMIN — PROPOFOL 150 MG: 10 INJECTION, EMULSION INTRAVENOUS at 07:39

## 2020-01-02 RX ADMIN — SODIUM CHLORIDE 120 ML/HR: 9 INJECTION, SOLUTION INTRAVENOUS at 20:27

## 2020-01-02 RX ADMIN — HYDROMORPHONE HYDROCHLORIDE 0.5 MG: 1 INJECTION, SOLUTION INTRAMUSCULAR; INTRAVENOUS; SUBCUTANEOUS at 10:26

## 2020-01-02 RX ADMIN — HYDROMORPHONE HYDROCHLORIDE 0.5 MG: 1 INJECTION, SOLUTION INTRAMUSCULAR; INTRAVENOUS; SUBCUTANEOUS at 10:05

## 2020-01-02 RX ADMIN — HYDROMORPHONE HYDROCHLORIDE 0.5 MG: 1 INJECTION, SOLUTION INTRAMUSCULAR; INTRAVENOUS; SUBCUTANEOUS at 11:44

## 2020-01-02 RX ADMIN — PROCHLORPERAZINE EDISYLATE 5 MG: 5 INJECTION INTRAMUSCULAR; INTRAVENOUS at 14:43

## 2020-01-02 RX ADMIN — FENTANYL CITRATE 25 MCG: 50 INJECTION, SOLUTION INTRAMUSCULAR; INTRAVENOUS at 08:11

## 2020-01-02 RX ADMIN — TRANEXAMIC ACID 1000 MG: 100 INJECTION, SOLUTION INTRAVENOUS at 08:35

## 2020-01-02 RX ADMIN — NEOSTIGMINE METHYLSULFATE 3 MG: 1 INJECTION, SOLUTION INTRAVENOUS at 09:10

## 2020-01-02 RX ADMIN — PROPOFOL 25 MCG/KG/MIN: 10 INJECTION, EMULSION INTRAVENOUS at 07:55

## 2020-01-02 RX ADMIN — BUPIVACAINE HYDROCHLORIDE 10 ML: 2.5 INJECTION, SOLUTION EPIDURAL; INFILTRATION; INTRACAUDAL; PERINEURAL at 09:40

## 2020-01-02 RX ADMIN — HYDROCODONE BITARTRATE AND ACETAMINOPHEN 1 TABLET: 5; 325 TABLET ORAL at 19:51

## 2020-01-02 RX ADMIN — CEFAZOLIN SODIUM 2 G: 2 INJECTION, SOLUTION INTRAVENOUS at 23:46

## 2020-01-02 RX ADMIN — SODIUM CHLORIDE, POTASSIUM CHLORIDE, SODIUM LACTATE AND CALCIUM CHLORIDE: 600; 310; 30; 20 INJECTION, SOLUTION INTRAVENOUS at 09:11

## 2020-01-02 RX ADMIN — HYDROMORPHONE HYDROCHLORIDE 0.5 MG: 1 INJECTION, SOLUTION INTRAMUSCULAR; INTRAVENOUS; SUBCUTANEOUS at 09:54

## 2020-01-02 RX ADMIN — ESMOLOL HYDROCHLORIDE 20 MG: 10 INJECTION INTRAVENOUS at 08:13

## 2020-01-02 RX ADMIN — ESMOLOL HYDROCHLORIDE 10 MG: 10 INJECTION INTRAVENOUS at 08:15

## 2020-01-02 RX ADMIN — ACETAMINOPHEN 1000 MG: 500 TABLET ORAL at 06:50

## 2020-01-02 RX ADMIN — ONDANSETRON 4 MG: 2 INJECTION INTRAMUSCULAR; INTRAVENOUS at 11:25

## 2020-01-02 RX ADMIN — FENTANYL CITRATE 25 MCG: 50 INJECTION, SOLUTION INTRAMUSCULAR; INTRAVENOUS at 08:12

## 2020-01-02 RX ADMIN — SODIUM CHLORIDE, POTASSIUM CHLORIDE, SODIUM LACTATE AND CALCIUM CHLORIDE 9 ML/HR: 600; 310; 30; 20 INJECTION, SOLUTION INTRAVENOUS at 06:50

## 2020-01-02 RX ADMIN — INSULIN LISPRO 3 UNITS: 100 INJECTION, SOLUTION INTRAVENOUS; SUBCUTANEOUS at 20:23

## 2020-01-02 RX ADMIN — ROCURONIUM BROMIDE 30 MG: 10 INJECTION INTRAVENOUS at 07:39

## 2020-01-02 RX ADMIN — ROPIVACAINE HYDROCHLORIDE 10 ML/HR: 5 INJECTION, SOLUTION EPIDURAL; INFILTRATION; PERINEURAL at 09:12

## 2020-01-02 RX ADMIN — METHOCARBAMOL 750 MG: 750 TABLET ORAL at 12:31

## 2020-01-02 RX ADMIN — DEXAMETHASONE SODIUM PHOSPHATE 8 MG: 4 INJECTION, SOLUTION INTRAMUSCULAR; INTRAVENOUS at 07:45

## 2020-01-02 NOTE — ANESTHESIA POSTPROCEDURE EVALUATION
"Patient: Jewels Venegas    Procedure Summary     Date:  01/02/20 Room / Location:   [facility] [address] [place] [address] [facility]    Anesthesia Start:  0733 Anesthesia Stop:  0935    Procedure:  TOTAL KNEE ARTHROPLASTY LEFT (Left Knee) Diagnosis:       Primary osteoarthritis of left knee      (Primary osteoarthritis of left knee [M17.12])    Surgeon:  Faustino Weinstein MD Provider:  Tina Pablo MD    Anesthesia Type:  general with block ASA Status:  3          Anesthesia Type: general with block    Vitals  Vitals Value Taken Time   /77 1/2/2020  9:38 AM   Temp 97.4 °F (36.3 °C) 1/2/2020  9:38 AM   Pulse 82 1/2/2020  9:38 AM   Resp 16 1/2/2020  9:38 AM   SpO2 99 % 1/2/2020  9:38 AM           Post Anesthesia Care and Evaluation    Patient location during evaluation: PACU  Patient participation: complete - patient participated  Level of consciousness: awake and alert  Pain score: 0  Pain management: adequate  Airway patency: patent  Anesthetic complications: No anesthetic complications  PONV Status: none  Cardiovascular status: hemodynamically stable and acceptable  Respiratory status: nonlabored ventilation, acceptable and nasal cannula  Hydration status: acceptable    Comments: Pt transported to PACU with O2 via nasal cannula at 4 L/MIN. Vital signs stable.  Pt shows no signs of distress.  Report given to PACU RN. /77   Pulse 82   Temp 97.4 °F (36.3 °C)   Resp 16   Ht 168.9 cm (66.5\")   Wt 87.9 kg (193 lb 12.6 oz)   SpO2 99%   BMI 30.81 kg/m²         "

## 2020-01-02 NOTE — H&P
Patient Name: Jewels Venegas  MRN: [id number]  : 1940  DOS: 2020    Attending: Faustino Weinstein MD    Primary Care Provider: Renée Multani MD      Chief complaint: Left knee pain    Subjective   Patient is a 79 y.o. female presented for left total knee arthroplasty by Dr. Weinstein.    She underwent surgery under general anesthesia.  She tolerated surgery well and is admitted for further medical management.     She is known to us from previous admission in 2019 for right knee replacement; which she recovered well.    When seen postop she is doing okay.  She complains of mild pain and nausea.  She denies shortness of breath or chest pain.  No history of DVT or PE.    Allergies:  Allergies   Allergen Reactions   • Betadine [Povidone Iodine] Rash     Itching, and blisters   • Adhesive Tape Rash     Plastic & cloth, can tolerate paper tape   • Codeine Itching   • Influenza Vaccines Other (See Comments) and Myalgia     fever       Meds:  Medications Prior to Admission   Medication Sig Dispense Refill Last Dose   • acetaminophen (TYLENOL) 500 MG tablet Take 1,000 mg by mouth 2 (Two) Times a Day.   2020 at 1800   • amLODIPine (NORVASC) 5 MG tablet Take 5 mg by mouth Daily.   2020 at 1800   • atorvastatin (LIPITOR) 20 MG tablet Take 10 mg by mouth 2 (Two) Times a Week. MON AND THURS   2020 at 1800   • Dermatological Products, Misc. (EPICERAM) lotion Apply twice daily to back (Patient taking differently: Apply 1 application topically to the appropriate area as directed 2 (Two) Times a Day With Meals. Apply twice daily to back) 225 g 0 2020 at 2100   • fluticasone (FLONASE) 50 MCG/ACT nasal spray 2 sprays into each nostril As Needed for Rhinitis.   Past Month at Unknown time   • HYDROCHLOROTHIAZIDE PO Take 12.5 mg by mouth Daily.   2020 at 0800   • levocetirizine (XYZAL) 5 MG tablet Take 5 mg by mouth Every Evening.   2020 at 2100   • levothyroxine (SYNTHROID, LEVOTHROID) 112 MCG  "tablet Take 112 mcg by mouth Daily.   2020 at 0800   • methocarbamol (ROBAXIN) 750 MG tablet Take 1 tablet by mouth 2 (Two) Times a Day. 60 tablet 5 2020 at 1800   • Multiple Vitamins-Minerals (MULTIVITAMIN WITH MINERALS) tablet tablet Take 1 tablet by mouth Daily.   2020 at 0800   • traMADol (ULTRAM) 50 MG tablet As Needed.   Past Month at Unknown time   • Krill Oil 500 MG capsule Take 1 tablet by mouth Daily.   2019 at 0800       History:   Past Medical History:   Diagnosis Date   • Arthritis    • Back pain    • History of kidney stones    • Hyperlipidemia     triglycerides   • Hypertension    • Osteoporosis    • Seasonal allergies    • Thyroid disease    • TIA (transient ischemic attack)     \"Possibly\"   • Urinary frequency    • Urinary urgency    • Vertigo    • Wears dentures    • Wears glasses      Past Surgical History:   Procedure Laterality Date   • APPENDECTOMY     • CARDIAC CATHETERIZATION     • CHOLECYSTECTOMY     • COLONOSCOPY      COLOGARD SUMMER 2019   • LUMBAR LAMINECTOMY  2015    Dr. Domenico Reid, [facility] L2-5   • SPINAL CORD STIMULATOR IMPLANT N/A 10/1/2018    Procedure: SPINAL CORD STIMULATOR INSERTION PHASE 1 ;  Surgeon: Vince Olivas MD;  Location:  [facility];  Service: Neurosurgery   • TONSILLECTOMY     • TOTAL KNEE ARTHROPLASTY Right 2019    Procedure: TOTAL KNEE ARTHROPLASTY RIGHT;  Surgeon: Faustino Weinstein MD;  Location:  [facility];  Service: Orthopedics     Family History   Problem Relation Age of Onset   • Cancer Father    • Heart disease Father    • Breast cancer Neg Hx      Social History     Tobacco Use   • Smoking status: Former Smoker     Years: 20.00     Types: Cigarettes     Last attempt to quit: 1980     Years since quittin.0   • Smokeless tobacco: Never Used   • Tobacco comment: Smoking 4 (!!) PPD when patient quit    Substance Use Topics   • Alcohol use: No   • Drug use: No   She lives alone and has 2 children. She is a " "retired nurse.    Review of Systems  Pertinent items are noted in HPI, all other systems reviewed and negative    Vital Signs  Visit Vitals  /74 (BP Location: Right arm, Patient Position: Lying)   Pulse 86   Temp 97.5 °F (36.4 °C) (Oral)   Resp 16   Ht 168.9 cm (66.5\")   Wt 87.9 kg (193 lb 12.6 oz)   SpO2 94%   BMI 30.81 kg/m²       Physical Exam:    General Appearance:    Alert, cooperative, in no acute distress   Head:    Normocephalic, without obvious abnormality, atraumatic   Eyes:            Lids and lashes normal, conjunctivae and sclerae normal, no   icterus, no pallor, corneas clear   Ears:    Ears appear intact with no abnormalities noted   Neck:   No adenopathy, supple, trachea midline, no thyromegaly   Lungs:     Clear to auscultation, respirations regular, even and                   unlabored    Heart:    Regular rhythm and normal rate, normal S1 and S2, no            murmur, no gallop   Abdomen:     Normal bowel sounds, no masses, no organomegaly, soft        non-tender, non-distended, no guarding, no rebound                 tenderness   Genitalia:    Deferred   Extremities:  Left knee Ace wrap clean dry intact.  Nerve block present.   Pulses:   Pulses palpable and equal bilaterally   Skin:   No bleeding, bruising or rash   Neurologic:   Cranial nerves 2 - 12 grossly intact, sensation intact. Flexion and dorsiflexion intact bilateral feet.        I reviewed the patient's new clinical results.       Results from last 7 days   Lab Units 01/02/20  0633   POTASSIUM mmol/L 3.8     Lab Results   Component Value Date    HGBA1C 5.80 (H) 12/20/2019     Results for MADHAVI ROBLERO (MRN [id number]) as of 1/2/2020 11:43   Ref. Range 12/20/2019 12:15   Glucose Latest Ref Range: 65 - 99 mg/dL 98   Sodium Latest Ref Range: 136 - 145 mmol/L 140   Potassium Latest Ref Range: 3.5 - 5.2 mmol/L 4.0   CO2 Latest Ref Range: 22.0 - 29.0 mmol/L 26.0   Chloride Latest Ref Range: 98 - 107 mmol/L 100   Anion Gap Latest " Ref Range: 5.0 - 15.0 mmol/L 14.0   Creatinine Latest Ref Range: 0.57 - 1.00 mg/dL 0.57   BUN Latest Ref Range: 8 - 23 mg/dL 15   BUN/Creatinine Ratio Latest Ref Range: 7.0 - 25.0  26.3 (H)   Calcium Latest Ref Range: 8.6 - 10.5 mg/dL 10.2   eGFR Non  Am Latest Ref Range: >60 mL/min/1.73 103   Results for MADHAVI ROBLERO (MRN [id number]) as of 1/2/2020 11:43   Ref. Range 12/20/2019 12:17   WBC Latest Ref Range: 3.40 - 10.80 10*3/mm3 5.90   RBC Latest Ref Range: 3.77 - 5.28 10*6/mm3 4.56   Hemoglobin Latest Ref Range: 12.0 - 15.9 g/dL 13.4   Hematocrit Latest Ref Range: 34.0 - 46.6 % 42.5   RDW Latest Ref Range: 12.3 - 15.4 % 14.6   MCV Latest Ref Range: 79.0 - 97.0 fL 93.2   MCH Latest Ref Range: 26.6 - 33.0 pg 29.4   MCHC Latest Ref Range: 31.5 - 35.7 g/dL 31.5   MPV Latest Ref Range: 6.0 - 12.0 fL 8.8   Platelets Latest Ref Range: 140 - 450 10*3/mm3 315     Assessment and Plan:     Status post total left knee replacement    HTN (hypertension)    HLD (hyperlipidemia)    Hypothyroid    Primary osteoarthritis of left knee    Prediabetes      Plan  1. PT/OT- early ambulation postop  2. Pain control-prns, AC nerve block   3. IS-encourage  4. DVT proph- mechs/ASA  5. Bowel regimen  6. Resume home medications as appropriate  7. Monitor post-op labs  8. Discharge planning for home    HTN, HLD  - Continue home norvasc and statin  - Hold HCTZ  - Monitor BP   - Holding parameters for BP meds  - Labetalol PRN for SBP>170     Hypothyroid  - Continue home synthroid     PreDM  - hgb A1c on 12/20/2019 5.8  - Accu-Chek AC and HS with low dose SSI      Tristan Abraham MD  01/02/20  5:18 PM

## 2020-01-02 NOTE — ANESTHESIA PROCEDURE NOTES
L SS Adductor     Pre-sedation assessment completed: 1/2/2020 7:38 AM    Patient reassessed immediately prior to procedure    Patient location during procedure: post-op  Stop time: 1/2/2020 7:41 AM  Reason for block: at surgeon's request and post-op pain management  Performed by  CRNA: Bradley Harrington CRNA  Preanesthetic Checklist  Completed: patient identified, site marked, surgical consent, pre-op evaluation, timeout performed, IV checked, risks and benefits discussed and monitors and equipment checked  Prep:  Pt Position: supine  Sterile barriers:cap, gloves, mask and sterile barriers  Prep: ChloraPrep  Patient monitoring: blood pressure monitoring, continuous pulse oximetry and EKG  Procedure  Sedation:yes  Performed under: general  Guidance:ultrasound guided  Images:still images obtained, printed/placed on chart    Laterality:left  Block Type:adductor canal block  Injection Technique:single-shot  Needle Type:echogenic and short-bevel  Needle Gauge:18 G  Resistance on Injection: none  Catheter size: 20g.    Medications Used: bupivacaine PF (MARCAINE) 0.25 % injection, 20 mL  Med admintered at 1/2/2020 7:41 AM      Post Assessment  Injection Assessment: negative aspiration for heme, incremental injection and no paresthesia on injection  Patient Tolerance:comfortable throughout block  Complications:no  Additional Notes  Procedure:             The pt was placed in the Supine position.  The Insertion site was  prepped and Draped in sterile fashion.  The pt was anesthetized with  IV Sedation( see meds).  Skin and cutaneous tissue was infiltrated and anesthetized with 1% Lidocaine 3 mls via a 25g needle.  A BBraun 4 inch 18g echogenic needle was then  inserted approximately midline, mid-thigh and advanced In-plane with Ultrasound guidance.  Normal Saline PSF was utilized for hydrodissection of tissue.  The Vastus medialis and Sartorius muscle where visualized and the needle tip was placed in the adductor canal,   lateral to the femoral artery.  LA injection spread was visualized, injection was incremental 1-5ml, injection pressure was normal or little, no intraneural injection, no vascular injection.  LA dose was injected thru the needle(see dose above).  A BBraun 20g wire stylet catheter was placed via the needle with ultrasound visualization and confirmation with NS fluid bolus. The labeled Catheter was then secured to skin at insertion site with skin afix and steristrips to curled catheter and CHG transparent dressing.  Thank you.

## 2020-01-02 NOTE — PLAN OF CARE
Problem: Patient Care Overview  Goal: Plan of Care Review  Outcome: Ongoing (interventions implemented as appropriate)  Flowsheets (Taken 1/2/2020 1631)  Progress: improving  Plan of Care Reviewed With: patient; sibling  Outcome Summary: Pt. admitted with LTK, she is alert, oriented x 4, and pleasant. VSS. pt. has had c/o nausea. She ambulated with PT and sat up in the chair today, she has had no c/o pain. Will continue to monitor.

## 2020-01-02 NOTE — OP NOTE
DATE OF PROCEDURE: 01/02/20    PREOPERATIVE DIAGNOSIS: left knee arthritis      POSTOPERATIVE DIAGNOSIS:  left knee arthritis    PROCEDURES PERFORMED:   left total knee arthroplasty with [employer] components (#6 narrow posterior stabilized femur, #4 tibia, 10 mm polyethylene, with 32 three peg patella).     SURGEON: Faustino Weinstein MD    ASSISTANT: Uzma Edge PA-C     SPECIMENS: None    IMPLANTS:   Implants     Implant        Cmt Bone Simplex/P Full Dose 10/Pk - [id number] - Implanted   (Left) Knee    Inventory item: CMT BONE SIMPLEX/P FULL DOSE 10/PK Model/Cat number: [id number]    : [employer] Lot number: [id number]    As of 1/2/2020     Status: Implanted                  Base Tib/Kn Gen2 Nonpor Ti Sz4 Lt - [id number] - Implanted   (Left) Knee    Inventory item: BASE TIB/KN GEN2 NONPOR TI SZ4 LT Model/Cat number: [id number]    : [employer] Lot number: [id number]    As of 1/2/2020     Status: Implanted                  Comp Fem Legion Oxinium Ps Nrw Sz6n Lt - [id number] - Implanted   (Left) Knee    Inventory item: COMP FEM LEGION OXINIUM PS NRW SZ6N LT Model/Cat number: [id number]    : [employer] Lot number: [id number]    As of 1/2/2020     Status: Implanted                  Pat Gen2 Resrf 32mm - [id number] - Implanted   (Left) Knee    Inventory item: PAT GEN2 RESRF 32MM Model/Cat number: [id number]    : [employer] Lot number: [id number]    As of 1/2/2020     Status: Implanted                  Insrt Art Legion Ps Hf Xlpe Sz3to4 10mm - [id number] - Implanted   (Left) Knee    Inventory item: INSRT ART LEGION PS HF XLPE SZ3TO4 10MM Model/Cat number: [id number]    : [employer] Lot number: [id number]    As of 1/2/2020     Status: Implanted                         ANESTHESIA:  Spinal    STAFF:  Circulator: Sari Waddell RN  Scrub Person: Sergio Monzon  Nursing Assistant: Clara Payne    TOURNIQUET TIME: 15 minutes    ESTIMATED  BLOOD LOSS: 300 cc     COMPLICATIONS: None    PREOPERATIVE ANTIBIOTICS: Ancef 2 g    INDICATIONS: The patient is a 79 y.o. female with debilitating left knee pain secondary to advanced osteoarthritis that failed to improve in spite of conservative treatment .  Options have been discussed at length with the patient and the patient has had an extended course of conservative treatment without long-term benefit. The patient has reached the point where the patient desires total knee arthroplasty surgery and understands the risks, benefits, and alternatives. Consent was obtained. Please see my office notes for details with regard to preoperative counseling and operative rationale.     DESCRIPTION OF PROCEDURE: The patient was positively identified in the preoperative holding area and brought to the operating suite and placed in a supine position. After adequate spinal anesthetic had been achieved, the left lower extremity was prepped and draped in the usual sterile fashion.  After application of a tourniquet to the left upper thigh, which was used during the procedure for a total 15 minutes during the cementation process only. Landmarks of the knee were identified and timeout procedure was performed to confirm the operative site, as well as other parameters. Following the sterile prep and drape, a skin incision was made just off the medial aspect of midline for a medial parapatellar approach. Following a sharp skin incision, dissection was carried down to the level of the extensor mechanism and a medial parapatellar arthrotomy was made and the patella was tucked into the lateral gutter. Description of arthritis: Bone-on-bone contact in the medial and patellofemoral compartments, with large tricompartmental osteophytes, varus alignment, and a large posterior lateral cyst in the soft tissues.  The knee was adequately exposed and a distal femoral cut was made with an intramedullary guide. This was subsequently sized for a #6  implant and anterior, posterior chamfer cuts made. The menisci removed both medially and laterally.  The ACL was transected and the tibia was subluxed anteriorly. Proximal tibia cut was made with the external alignment guide. The cut was noted to be perpendicular to the tibial axis, with symmetric flexion and extension gaps. Therefore, final tibial preparations to accommodate a #4 tibia were made, followed by final femoral preparations for the box region. With a trial 10 insert in place, full flexion and extension was noted with no instability. The patella was then prepared for a 32 three peg patella which had excellent tracking. All trial components were removed and final components were cemented in place with namely a #4 tibia, #6 narrow posterior stabilized femur and a 32 three peg patella with a trial 11 insert for cement compression. All the excess cement was removed from the bone implant interface and allowed to harden. Tourniquet was deflated. Hemostasis was obtained with electrocautery. There was no brisk bleeding noted in the popliteal fossa in particular. Therefore, the knee was copiously irrigated as it was between major steps, and the final 10 insert was placed as this was deemed appropriate for the patient's anatomy with full flexion and extension and no instability and attention was then directed towards closure. The medial parapatellar arthrotomy was closed with #1 Vicryl in an interrupted figure-of-eight fashion in 4 strategic locations followed by oversewing this from proximal to distal with a #1 StrataFix symmetric, which nicely sealed the joint, followed by closure of the deep fascial layer with #1 Vicryl in a buried interrupted fashion, followed by closure of the subcutaneous layer with 2-0 Vicryl and the skin with 3-0 Stratafix in a running subcuticular fashion. Prineo wound closure dressing was applied followed by a sterile dressing with 4 x 4's, abdominal pad, soft roll and Ace wrap. The  patient tolerated the procedure well and was brought to the recovery room in good condition.     PLAN:  1.  The patient will begin early range of motion and weight-bearing per the post total knee arthroplasty protocol.   2.  I anticipate brief hospitalization for initial rehabilitation and pain control followed by continued rehabilitation in either home health or supervised setting.   3.  Postoperative medical management with Dr. Veliz.  4.  Aspirin will be utilized for DVT prophylaxis unless there is a contraindication.       Faustino Weinstein MD  01/02/20  9:08 AM

## 2020-01-02 NOTE — PLAN OF CARE
Problem: Patient Care Overview  Goal: Plan of Care Review  Outcome: Ongoing (interventions implemented as appropriate)  Flowsheets (Taken 1/2/2020 1345)  Outcome Summary: Patient ambulated 150 feet with RW and step through gait pattern, initially unsteady but improved as gait progressed, limited by pain, nausea, and dizziness. Plan is d/c home with family and outpatient PT. Encouraged patient to ambulate with nursing again later this afternoon. ROM to be initiated POD#1. Will continue to progress as able. PADD score of 7.

## 2020-01-02 NOTE — CONSULTS
Patient Name: Jewels Venegas  MRN: [id number]  : 1940  DOS: 2020    Attending: Faustino Weinstein MD    Primary Care Provider: Renée Multani MD      Chief complaint: Left knee pain    Subjective   Patient is a 79 y.o. female presented for left total knee arthroplasty by Dr. Weinstein.    She underwent surgery under general anesthesia.  She tolerated surgery well and is admitted for further medical management.     She is known to us from previous admission in 2019 for right knee replacement; which she recovered well.    When seen postop she is doing okay.  She complains of mild pain and nausea.  She denies shortness of breath or chest pain.  No history of DVT or PE.    Allergies:  Allergies   Allergen Reactions   • Betadine [Povidone Iodine] Rash     Itching, and blisters   • Adhesive Tape Rash     Plastic & cloth, can tolerate paper tape   • Codeine Itching   • Influenza Vaccines Other (See Comments) and Myalgia     fever       Meds:  Medications Prior to Admission   Medication Sig Dispense Refill Last Dose   • acetaminophen (TYLENOL) 500 MG tablet Take 1,000 mg by mouth 2 (Two) Times a Day.   2020 at 1800   • amLODIPine (NORVASC) 5 MG tablet Take 5 mg by mouth Daily.   2020 at 1800   • atorvastatin (LIPITOR) 20 MG tablet Take 10 mg by mouth 2 (Two) Times a Week. MON AND THURS   2020 at 1800   • Chlorhexidine Gluconate 4 % solution Shower with solution as directed for 5 days prior to surgery 237 mL 0 2020 at 2100   • Dermatological Products, Misc. (EPICERAM) lotion Apply twice daily to back (Patient taking differently: Apply 1 application topically to the appropriate area as directed 2 (Two) Times a Day With Meals. Apply twice daily to back) 225 g 0 2020 at 2100   • fluticasone (FLONASE) 50 MCG/ACT nasal spray 2 sprays into each nostril As Needed for Rhinitis.   Past Month at Unknown time   • HYDROCHLOROTHIAZIDE PO Take 12.5 mg by mouth Daily.   2020 at 0800   •  "levocetirizine (XYZAL) 5 MG tablet Take 5 mg by mouth Every Evening.   1/1/2020 at 2100   • levothyroxine (SYNTHROID, LEVOTHROID) 112 MCG tablet Take 112 mcg by mouth Daily.   1/1/2020 at 0800   • methocarbamol (ROBAXIN) 750 MG tablet Take 1 tablet by mouth 2 (Two) Times a Day. 60 tablet 5 1/1/2020 at 1800   • Multiple Vitamins-Minerals (MULTIVITAMIN WITH MINERALS) tablet tablet Take 1 tablet by mouth Daily.   1/1/2020 at 0800   • mupirocin (BACTROBAN) 2 % ointment Apply into the nostril(s) as directed by provider 2 (Two) Times a Day. 22 g 0 1/1/2020 at 2100   • traMADol (ULTRAM) 50 MG tablet As Needed.   Past Month at Unknown time   • Krill Oil 500 MG capsule Take 1 tablet by mouth Daily.   12/26/2019 at 0800       History:   Past Medical History:   Diagnosis Date   • Arthritis    • Back pain    • History of kidney stones    • Hyperlipidemia     triglycerides   • Hypertension    • Osteoporosis    • Seasonal allergies    • Thyroid disease    • TIA (transient ischemic attack)     \"Possibly\"   • Urinary frequency    • Urinary urgency    • Vertigo    • Wears dentures    • Wears glasses      Past Surgical History:   Procedure Laterality Date   • APPENDECTOMY  1977   • CARDIAC CATHETERIZATION     • CHOLECYSTECTOMY     • COLONOSCOPY      COLOGARD SUMMER 2019   • LUMBAR LAMINECTOMY  06/2015    Dr. Domenico Reid, [facility] L2-5   • SPINAL CORD STIMULATOR IMPLANT N/A 10/1/2018    Procedure: SPINAL CORD STIMULATOR INSERTION PHASE 1 ;  Surgeon: Vince Olivas MD;  Location:  [facility];  Service: Neurosurgery   • TONSILLECTOMY     • TOTAL KNEE ARTHROPLASTY Right 8/20/2019    Procedure: TOTAL KNEE ARTHROPLASTY RIGHT;  Surgeon: Faustino Weinstein MD;  Location:  [facility];  Service: Orthopedics     Family History   Problem Relation Age of Onset   • Cancer Father    • Heart disease Father    • Breast cancer Neg Hx      Social History     Tobacco Use   • Smoking status: Former Smoker     Years: 20.00     Types: " "Cigarettes     Last attempt to quit: 1980     Years since quittin.0   • Smokeless tobacco: Never Used   • Tobacco comment: Smoking 4 (!!) PPD when patient quit    Substance Use Topics   • Alcohol use: No   • Drug use: No   She lives alone and has 2 children. She is a retired nurse.    Review of Systems  Pertinent items are noted in HPI, all other systems reviewed and negative    Vital Signs  Visit Vitals  /74 (BP Location: Right arm, Patient Position: Lying)   Pulse 86   Temp 97.5 °F (36.4 °C) (Oral)   Resp 16   Ht 168.9 cm (66.5\")   Wt 87.9 kg (193 lb 12.6 oz)   SpO2 94%   BMI 30.81 kg/m²       Physical Exam:    General Appearance:    Alert, cooperative, in no acute distress   Head:    Normocephalic, without obvious abnormality, atraumatic   Eyes:            Lids and lashes normal, conjunctivae and sclerae normal, no   icterus, no pallor, corneas clear   Ears:    Ears appear intact with no abnormalities noted   Neck:   No adenopathy, supple, trachea midline, no thyromegaly   Lungs:     Clear to auscultation, respirations regular, even and                   unlabored    Heart:    Regular rhythm and normal rate, normal S1 and S2, no            murmur, no gallop   Abdomen:     Normal bowel sounds, no masses, no organomegaly, soft        non-tender, non-distended, no guarding, no rebound                 tenderness   Genitalia:    Deferred   Extremities:  Left knee Ace wrap clean dry intact.  Nerve block present.   Pulses:   Pulses palpable and equal bilaterally   Skin:   No bleeding, bruising or rash   Neurologic:   Cranial nerves 2 - 12 grossly intact, sensation intact. Flexion and dorsiflexion intact bilateral feet.        I reviewed the patient's new clinical results.       Results from last 7 days   Lab Units 20  0633   POTASSIUM mmol/L 3.8     Lab Results   Component Value Date    HGBA1C 5.80 (H) 2019     Results for MADHAVI ROBLERO (MRN [id number]) as of 2020 11:43   Ref. Range " 12/20/2019 12:15   Glucose Latest Ref Range: 65 - 99 mg/dL 98   Sodium Latest Ref Range: 136 - 145 mmol/L 140   Potassium Latest Ref Range: 3.5 - 5.2 mmol/L 4.0   CO2 Latest Ref Range: 22.0 - 29.0 mmol/L 26.0   Chloride Latest Ref Range: 98 - 107 mmol/L 100   Anion Gap Latest Ref Range: 5.0 - 15.0 mmol/L 14.0   Creatinine Latest Ref Range: 0.57 - 1.00 mg/dL 0.57   BUN Latest Ref Range: 8 - 23 mg/dL 15   BUN/Creatinine Ratio Latest Ref Range: 7.0 - 25.0  26.3 (H)   Calcium Latest Ref Range: 8.6 - 10.5 mg/dL 10.2   eGFR Non  Am Latest Ref Range: >60 mL/min/1.73 103   Results for MADHAVI ROBLERO (MRN [id number]) as of 1/2/2020 11:43   Ref. Range 12/20/2019 12:17   WBC Latest Ref Range: 3.40 - 10.80 10*3/mm3 5.90   RBC Latest Ref Range: 3.77 - 5.28 10*6/mm3 4.56   Hemoglobin Latest Ref Range: 12.0 - 15.9 g/dL 13.4   Hematocrit Latest Ref Range: 34.0 - 46.6 % 42.5   RDW Latest Ref Range: 12.3 - 15.4 % 14.6   MCV Latest Ref Range: 79.0 - 97.0 fL 93.2   MCH Latest Ref Range: 26.6 - 33.0 pg 29.4   MCHC Latest Ref Range: 31.5 - 35.7 g/dL 31.5   MPV Latest Ref Range: 6.0 - 12.0 fL 8.8   Platelets Latest Ref Range: 140 - 450 10*3/mm3 315     Assessment and Plan:     Status post total left knee replacement    Primary osteoarthritis of left knee    HTN (hypertension)    HLD (hyperlipidemia)    Hypothyroid    Prediabetes      Plan  1. PT/OT- early ambulation postop  2. Pain control-prns, AC nerve block   3. IS-encourage  4. DVT proph- mechs/ASA  5. Bowel regimen  6. Resume home medications as appropriate  7. Monitor post-op labs  8. Discharge planning for home    HTN, HLD  - Continue home norvasc and statin  - Hold HCTZ  - Monitor BP   - Holding parameters for BP meds  - Labetalol PRN for SBP>170     Hypothyroid  - Continue home synthroid     PreDM  - hgb A1c on 12/20/2019 5.8  - Accu-Chek AC and HS with low dose SSI      Coral Benavidez, AMITA  01/02/20  11:41 AM

## 2020-01-02 NOTE — ANESTHESIA PROCEDURE NOTES
Airway  Urgency: elective    Date/Time: 1/2/2020 7:42 AM  Airway not difficult    General Information and Staff    Patient location during procedure: OR  CRNA: Monica Vincent CRNA    Indications and Patient Condition  Indications for airway management: airway protection    Preoxygenated: yes  MILS not maintained throughout  Mask difficulty assessment: 1 - vent by mask    Final Airway Details  Final airway type: endotracheal airway      Successful airway: ETT  Cuffed: yes   Successful intubation technique: direct laryngoscopy  Facilitating devices/methods: intubating stylet  Endotracheal tube insertion site: oral  Blade: Cassy  Blade size: 3  ETT size (mm): 7.0  Cormack-Lehane Classification: grade I - full view of glottis  Placement verified by: chest auscultation and capnometry   Cuff volume (mL): 7  Measured from: gums  ETT/EBT to gums (cm): 20  Number of attempts at approach: 1  Assessment: lips, teeth, and gum same as pre-op and atraumatic intubation    Additional Comments  Negative epigastric sounds, Breath sound equal bilaterally with symmetric chest rise and fall

## 2020-01-02 NOTE — THERAPY EVALUATION
"Patient Name: Jewels Venegas  : 1940    MRN: [id number]                              Today's Date: 2020       Admit Date: 2020    Visit Dx:     ICD-10-CM ICD-9-CM   1. Status post total left knee replacement Z96.652 V43.65   2. Primary osteoarthritis of left knee M17.12 715.16     Patient Active Problem List   Diagnosis   • Post laminectomy syndrome   • S/P insertion of spinal cord stimulator   • Osteoporosis   • Lumbar stenosis with neurogenic claudication   • Polyneuropathy   • Spondylarthrosis   • Benign paroxysmal positional vertigo   • Insomnia due to medical condition   • At high risk for falls   • Physical deconditioning   • Encounter for fitting and adjustment of neuropacemaker of spinal cord   • Primary osteoarthritis of both knees   • Primary osteoarthritis of right knee   • Status post total left knee replacement   • HTN (hypertension)   • HLD (hyperlipidemia)   • Hypothyroid   • Leukocytosis, mild, likely reactive   • Acute postoperative pain   • Acute blood loss anemia, mild, asymptomatic    • Primary osteoarthritis of left knee   • Prediabetes     Past Medical History:   Diagnosis Date   • Arthritis    • Back pain    • History of kidney stones    • Hyperlipidemia     triglycerides   • Hypertension    • Osteoporosis    • Seasonal allergies    • Thyroid disease    • TIA (transient ischemic attack)     \"Possibly\"   • Urinary frequency    • Urinary urgency    • Vertigo    • Wears dentures    • Wears glasses      Past Surgical History:   Procedure Laterality Date   • APPENDECTOMY     • CARDIAC CATHETERIZATION     • CHOLECYSTECTOMY     • COLONOSCOPY      COLOGARD SUMMER 2019   • LUMBAR LAMINECTOMY  2015    Dr. Domenico Reid, [facility] L2-5   • SPINAL CORD STIMULATOR IMPLANT N/A 10/1/2018    Procedure: SPINAL CORD STIMULATOR INSERTION PHASE 1 ;  Surgeon: Vince Olivas MD;  Location: [facility];  Service: Neurosurgery   • TONSILLECTOMY     • TOTAL KNEE ARTHROPLASTY " Right 8/20/2019    Procedure: TOTAL KNEE ARTHROPLASTY RIGHT;  Surgeon: Faustino Weinstein MD;  Location: [facility];  Service: Orthopedics     General Information     Row Name 01/02/20 1341          PT Evaluation Time/Intention    Document Type  evaluation  -LR     Mode of Treatment  physical therapy;individual therapy  -LR     Row Name 01/02/20 1341          General Information    Patient Profile Reviewed?  yes  -LR     Prior Level of Function  min assist:;all household mobility;gait;transfer;bed mobility;ADL's;using stairs;shopping;dependent:;home management;cooking;cleaning;community mobility;independent:;driving all mobility limited by pain, used RW for mobility, no long distance mobility  -LR     Existing Precautions/Restrictions  fall;other (see comments) L adductor canal nerve catheter  -LR     Barriers to Rehab  previous functional deficit  -LR     Row Name 01/02/20 1341          Relationship/Environment    Lives With  alone daughter and sister to assist  -LR     Row Name 01/02/20 1341          Resource/Environmental Concerns    Current Living Arrangements  home/apartment/condo  -LR     Row Name 01/02/20 1341          Home Main Entrance    Number of Stairs, Main Entrance  none  -LR     Row Name 01/02/20 1341          Stairs Within Home, Primary    Number of Stairs, Within Home, Primary  none  -LR     Row Name 01/02/20 1341          Cognitive Assessment/Intervention- PT/OT    Orientation Status (Cognition)  oriented x 4  -LR     Row Name 01/02/20 1341          Safety Issues, Functional Mobility    Safety Issues Affecting Function (Mobility)  safety precaution awareness;insight into deficits/self awareness;positioning of assistive device;safety precautions follow-through/compliance;sequencing abilities  -LR     Impairments Affecting Function (Mobility)  pain;strength;range of motion (ROM)  -LR       User Key  (r) = Recorded By, (t) = Taken By, (c) = Cosigned By    Initials Name Provider Type    LR Danisha Navarro  Felicia, PT Physical Therapist        Mobility     Row Name 01/02/20 1341          Bed Mobility Assessment/Treatment    Bed Mobility Assessment/Treatment  supine-sit  -LR     Supine-Sit [place] (Bed Mobility)  verbal cues;supervision  -LR     Assistive Device (Bed Mobility)  head of bed elevated;bed rails  -LR     Comment (Bed Mobility)  Verbal cues to move LEs towards EOB and to push up from bed to raise trunk into sitting and to scoot hips out to get feet on floor. C/o dizziness and nausea upon sitting up. Improved with rest.   -LR     Row Name 01/02/20 1341          Transfer Assessment/Treatment    Comment (Transfers)  Verbal cues to push up from bed to stand and to reach back for chair to lower into sitting. Verbal cues to step L LE out before t/f for comfort. Assisted to and from bathroom. Verbal cues to use grab bars for UE support. Patient with worsening dizziness on commode, recliner chair brought into bathroom for patient to t/f to.   -LR     Row Name 01/02/20 1341          Sit-Stand Transfer    Sit-Stand [place] (Transfers)  verbal cues;contact guard;2 person assist  -LR     Assistive Device (Sit-Stand Transfers)  walker, front-wheeled  -LR     Row Name 01/02/20 1341          Gait/Stairs Assessment/Training    14286 - Gait Training Minutes   8  -LR     [place] Level (Gait)  verbal cues;contact guard;2 person assist  -LR     Assistive Device (Gait)  walker, front-wheeled  -LR     Distance in Feet (Gait)  150  -LR     Pattern (Gait)  step-through  -LR     Deviations/Abnormal Patterns (Gait)  left sided deviations;antalgic;bilateral deviations;giuseppe decreased;stride length decreased;base of support, wide  -LR     Bilateral Gait Deviations  forward flexed posture;heel strike decreased  -LR     Left Sided Gait Deviations  weight shift ability decreased  -LR     Comment (Gait/Stairs)  Patient ambulated with step through gait pattern at slow pace with wide base. Initially unsteady but improved as  gait progressed. Repeated verbal cues to keep RW closer during gait. Verbal cues for increased L LE weight bearing/stance phase and increased L knee flexion during swing phase. Improved with cues for correction. Gait limited by pain, nausea, and dizziness.   -LR     Row Name 01/02/20 1341          Mobility Assessment/Intervention    Extremity Weight-bearing Status  left lower extremity  -LR     Left Lower Extremity (Weight-bearing Status)  weight-bearing as tolerated (WBAT)  -LR       User Key  (r) = Recorded By, (t) = Taken By, (c) = Cosigned By    Initials Name Provider Type    Danisha Pierre, PT Physical Therapist        Obj/Interventions     Row Name 01/02/20 1341          General ROM    GENERAL ROM COMMENTS  R LE AROM WFL; L knee AROM impaired 25%, will formally measure POD#1.   -LR     Row Name 01/02/20 1341          MMT (Manual Muscle Testing)    General MMT Comments  R LE WFL; L knee functionally 4-/5, independent with SLR, no knee buckling with gait  -LR     Row Name 01/02/20 1341          Therapeutic Exercise    Lower Extremity (Therapeutic Exercise)  gluteal sets;quad sets, left  -LR     Lower Extremity Range of Motion (Therapeutic Exercise)  ankle dorsiflexion/plantar flexion, left  -LR     Exercise Type (Therapeutic Exercise)  AROM (active range of motion);isotonic contraction, concentric;isometric contraction, static  -LR     Position (Therapeutic Exercise)  supine  -LR     Sets/Reps (Therapeutic Exercise)  x10 reps each  -LR     Comment (Therapeutic Exercise)  cues for technique; able to actively DF bilaterally  -LR     Row Name 01/02/20 1341          Sensory Assessment/Intervention    Sensory General Assessment  no sensation deficits identified denies numbness/tingling;light touch equal and intact  -LR       User Key  (r) = Recorded By, (t) = Taken By, (c) = Cosigned By    Initials Name Provider Type    Danisha Pierre, PT Physical Therapist        Goals/Plan     Row Name  01/02/20 1341          Bed Mobility Goal 1 (PT)    Activity/Assistive Device (Bed Mobility Goal 1, PT)  sit to supine/supine to sit  -LR     Cache Level/Cues Needed (Bed Mobility Goal 1, PT)  conditional independence  -LR     Time Frame (Bed Mobility Goal 1, PT)  long term goal (LTG);3 days  -LR     Progress/Outcomes (Bed Mobility Goal 1, PT)  goal ongoing  -LR     Row Name 01/02/20 1341          Transfer Goal 1 (PT)    Activity/Assistive Device (Transfer Goal 1, PT)  sit-to-stand/stand-to-sit;walker, rolling  -LR     Cache Level/Cues Needed (Transfer Goal 1, PT)  conditional independence  -LR     Time Frame (Transfer Goal 1, PT)  long term goal (LTG);3 days  -LR     Progress/Outcome (Transfer Goal 1, PT)  goal ongoing  -LR     Row Name 01/02/20 1341          Gait Training Goal 1 (PT)    Activity/Assistive Device (Gait Training Goal 1, PT)  gait (walking locomotion);walker, rolling  -LR     Cache Level (Gait Training Goal 1, PT)  conditional independence  -LR     Distance (Gait Goal 1, PT)  500 feet  -LR     Time Frame (Gait Training Goal 1, PT)  long term goal (LTG);3 days  -LR     Progress/Outcome (Gait Training Goal 1, PT)  goal ongoing  -LR     Row Name 01/02/20 1341          ROM Goal 1 (PT)    ROM Goal 1 (PT)  0-90 degrees L knee AAROM  -LR     Time Frame (ROM Goal 1, PT)  long term goal (LTG);3 days  -LR     Progress/Outcome (ROM Goal 1, PT)  goal ongoing  -LR       User Key  (r) = Recorded By, (t) = Taken By, (c) = Cosigned By    Initials Name Provider Type    LR Danisha Navarro, PT Physical Therapist        Clinical Impression     Row Name 01/02/20 1341          Pain Assessment    Additional Documentation  Pain Scale: Numbers Pre/Post-Treatment (Group)  -LR     Row Name 01/02/20 1341          Pain Scale: Numbers Pre/Post-Treatment    Pain Scale: Numbers, Pretreatment  4/10  -LR     Pain Scale: Numbers, Post-Treatment  5/10  -LR     Pain Location - Side  Left  -LR     Pain Location  - Orientation  anterior  -LR     Pain Location  knee  -LR     Pain Intervention(s)  Ambulation/increased activity;Repositioned  -LR     Row Name 01/02/20 1341          Plan of Care Review    Plan of Care Reviewed With  patient;sibling  -LR     Progress  improving  -LR     Row Name 01/02/20 1341          Physical Therapy Clinical Impression    Patient/Family Goals Statement (PT Clinical Impression)  go home, decrease pain  -LR     Criteria for Skilled Interventions Met (PT Clinical Impression)  yes;treatment indicated  -LR     Rehab Potential (PT Clinical Summary)  good, to achieve stated therapy goals  -LR     Row Name 01/02/20 1341          Vital Signs    Post Systolic BP Rehab  141  -LR     Post Treatment Diastolic BP  60  -LR     Post Patient Position  Sitting  -LR     Row Name 01/02/20 1341          Positioning and Restraints    Pre-Treatment Position  in bed  -LR     Post Treatment Position  chair  -LR     In Chair  notified nsg;reclined;sitting;call light within reach;encouraged to call for assist;exit alarm on;with family/caregiver;compression device;legs elevated  -LR       User Key  (r) = Recorded By, (t) = Taken By, (c) = Cosigned By    Initials Name Provider Type    LR Danisha Navarro, PT Physical Therapist        Outcome Measures     Row Name 01/02/20 1341          How much help from another person do you currently need...    Turning from your back to your side while in flat bed without using bedrails?  3  -LR     Moving from lying on back to sitting on the side of a flat bed without bedrails?  3  -LR     Moving to and from a bed to a chair (including a wheelchair)?  3  -LR     Standing up from a chair using your arms (e.g., wheelchair, bedside chair)?  3  -LR     Climbing 3-5 steps with a railing?  2  -LR     To walk in hospital room?  3  -LR     AM-PAC 6 Clicks Score (PT)  17  -LR     Row Name 01/02/20 1341          Functional Assessment    Outcome Measure Options  AM-PAC 6 Clicks Basic Mobility  (PT)  -LR       User Key  (r) = Recorded By, (t) = Taken By, (c) = Cosigned By    Initials Name Provider Type    Danisha Pierre, PT Physical Therapist          PT Recommendation and Plan  Planned Therapy Interventions (PT Eval): balance training, bed mobility training, gait training, home exercise program, stair training, patient/family education, strengthening, transfer training, ROM (range of motion)  Outcome Summary/Treatment Plan (PT)  Anticipated Equipment Needs at Discharge (PT): (none)  Anticipated Discharge Disposition (PT): home with assist, home with home health  Plan of Care Reviewed With: patient, sibling  Progress: improving  Outcome Summary: Patient ambulated 150 feet with RW and step through gait pattern, initially unsteady but improved as gait progressed, limited by pain, nausea, and dizziness. Plan is d/c home with family and outpatient PT. Encouraged patient to ambulate with nursing again later this afternoon. ROM to be initiated POD#1. Will continue to progress as able. PADD score of 7.     Time Calculation:   PT Charges     Row Name 01/02/20 1341             Time Calculation    Start Time  1341  -LR      PT Received On  01/02/20  -LR      PT Goal Re-Cert Due Date  01/12/20  -LR         Time Calculation- PT    Total Timed Code Minutes- PT  10 minute(s)  -LR         Timed Charges    26635 - PT Therapeutic Exercise Minutes  2  -LR      52604 - Gait Training Minutes   8  -LR        User Key  (r) = Recorded By, (t) = Taken By, (c) = Cosigned By    Initials Name Provider Type    Danisha Pierre, PT Physical Therapist        Therapy Charges for Today     Code Description Service Date Service Provider Modifiers Qty    07125489944 HC GAIT TRAINING EA 15 MIN 1/2/2020 Danisha Navarro, PT GP 1    59115383214 HC PT THER SUPP EA 15 MIN 1/2/2020 Danisha Navarro, PT GP 3    95052829460 HC PT EVAL MOD COMPLEXITY 3 1/2/2020 Danisha Navarro, PT GP 1          PT  G-Codes  Outcome Measure Options: AM-PAC 6 Clicks Basic Mobility (PT)  AM-PAC 6 Clicks Score (PT): 17    Danisha Navarro, PT  1/2/2020

## 2020-01-02 NOTE — ANESTHESIA PREPROCEDURE EVALUATION
Anesthesia Evaluation     Patient summary reviewed and Nursing notes reviewed   no history of anesthetic complications:  NPO Solid Status: > 8 hours  NPO Liquid Status: > 8 hours           Airway   Mallampati: II  TM distance: >3 FB  Neck ROM: full  Possible difficult intubation  Dental    (+) upper dentures        Pulmonary    (+) a smoker Former, decreased breath sounds,   (-) asthma  Cardiovascular     ECG reviewed  Rhythm: regular  Rate: normal    (+) hypertension, valvular problems/murmurs AI, murmur, hyperlipidemia,   (-) pacemaker, angina, cardiac stents      Neuro/Psych  (+) TIA, dizziness/light headedness (vertigo), numbness (polyneuropathy),     (-) seizures, CVA  GI/Hepatic/Renal/Endo    (+)   renal disease stones, thyroid problem hypothyroidism  (-) liver disease, diabetes    Musculoskeletal     (+) back pain (post laminectomy syndrome),   Abdominal    Substance History      OB/GYN          Other   arthritis,      ROS/Med Hx Other: Echo 10/2018  Mild AI  EF 60%  Several back surgery's L2-L5  Spinal cord stimulator by Dr Olivas                    Anesthesia Plan    ASA 3     general with block       Anesthetic plan, all risks, benefits, and alternatives have been provided, discussed and informed consent has been obtained with: patient.    Plan discussed with CRNA.

## 2020-01-02 NOTE — ANESTHESIA PROCEDURE NOTES
L adductor canal catheter    Pre-sedation assessment completed: 1/2/2020 9:35 AM    Patient reassessed immediately prior to procedure    Patient location during procedure: post-op  Stop time: 1/2/2020 9:43 AM  Reason for block: at surgeon's request and post-op pain management  Performed by  CRNA: Monica Vincent CRNA  Preanesthetic Checklist  Completed: patient identified, site marked, surgical consent, pre-op evaluation, timeout performed, IV checked, risks and benefits discussed and monitors and equipment checked  Prep:  Pt Position: supine  Sterile barriers:cap, gloves, mask and sterile barriers  Prep: ChloraPrep  Patient monitoring: blood pressure monitoring, continuous pulse oximetry and EKG  Procedure  Sedation:yes  Performed under: local infiltration  Guidance:ultrasound guided  Images:still images obtained, printed/placed on chart    Laterality:left  Block Type:adductor canal block  Injection Technique:catheter  Needle Type:Tuohy and echogenic  Needle Gauge:18 G  Resistance on Injection: none  Catheter Size:20 G (20g)  Cath Depth at skin: 8 cm    Medications Used: bupivacaine PF (MARCAINE) 0.25 % injection, 10 mL  Med admintered at 1/2/2020 9:40 AM      Post Assessment  Injection Assessment: negative aspiration for heme, incremental injection and no paresthesia on injection  Patient Tolerance:comfortable throughout block  Complications:no  Additional Notes  Procedure:             The pt was placed in the Supine position.  The Insertion site was  prepped and Draped in sterile fashion.  The pt was anesthetized with  IV Sedation( see meds).  Skin and cutaneous tissue was infiltrated and anesthetized with 1% Lidocaine 3 mls via a 25g needle.  A BBraun 4 inch 18g echogenic needle was then  inserted approximately midline, mid-thigh and advanced In-plane with Ultrasound guidance.  Normal Saline PSF was utilized for hydrodissection of tissue.  The Vastus medialis and Sartorius muscle where visualized and the  needle tip was placed in the adductor canal,  lateral to the femoral artery.  LA injection spread was visualized, injection was incremental 1-5ml, injection pressure was normal or little, no intraneural injection, no vascular injection.  LA dose was injected thru the needle(see dose above).  A BBraun 20g wire stylet catheter was placed via the needle with ultrasound visualization and confirmation with NS fluid bolus. The labeled Catheter was then secured to skin at insertion site with skin afix and steristrips to curled catheter and CHG transparent dressing.  Thank you.

## 2020-01-02 NOTE — H&P
Pre-Op H&P  Jewels Venegas  [id number]  1940    Chief complaint: Left knee pain    HPI:  Patient is a 79 y.o.female who presents with primary osteoarthritis of the left knee.  She has complaints of left knee pain described as severe, worse with walking, standing and climbing stairs.  She uses a walker.  Previous injections without long-term benefit.  The symptoms have progressively worsened and are affecting her activities of daily living and quality of life.  She has a known history of advanced arthritis, and has failed conservative treatment.    She underwent a right total knee replacement on 08/20/19 with an excellent postoperative outcome, stating she has 95% relief compared to preoperative symptoms.    She presents today for a total knee arthroplasty, left    Review of Systems:  General ROS: negative for chills, fever or skin lesions;  No changes since last office visit  Cardiovascular ROS: no chest pain or dyspnea on exertion  Respiratory ROS: no cough, shortness of breath, or wheezing    Allergies:   Allergies   Allergen Reactions   • Betadine [Povidone Iodine] Rash     Itching, and blisters   • Adhesive Tape Rash     Plastic & cloth, can tolerate paper tape   • Codeine Itching   • Influenza Vaccines Other (See Comments) and Myalgia     fever       Home Meds:    No current facility-administered medications on file prior to encounter.      Current Outpatient Medications on File Prior to Encounter   Medication Sig Dispense Refill   • acetaminophen (TYLENOL) 500 MG tablet Take 1,000 mg by mouth 2 (Two) Times a Day.     • amLODIPine (NORVASC) 5 MG tablet Take 5 mg by mouth Daily.     • atorvastatin (LIPITOR) 20 MG tablet Take 10 mg by mouth 2 (Two) Times a Week. MON AND THURS     • Chlorhexidine Gluconate 4 % solution Shower with solution as directed for 5 days prior to surgery 237 mL 0   • Dermatological Products, Misc. (EPICERAM) lotion Apply twice daily to back (Patient taking differently: Apply 1  "application topically to the appropriate area as directed 2 (Two) Times a Day With Meals. Apply twice daily to back) 225 g 0   • fluticasone (FLONASE) 50 MCG/ACT nasal spray 2 sprays into each nostril As Needed for Rhinitis.     • HYDROCHLOROTHIAZIDE PO Take 12.5 mg by mouth Daily.     • Krill Oil 500 MG capsule Take 1 tablet by mouth Daily.     • levothyroxine (SYNTHROID, LEVOTHROID) 112 MCG tablet Take 112 mcg by mouth Daily.     • methocarbamol (ROBAXIN) 750 MG tablet Take 1 tablet by mouth 2 (Two) Times a Day. 60 tablet 5   • Multiple Vitamins-Minerals (MULTIVITAMIN WITH MINERALS) tablet tablet Take 1 tablet by mouth Daily.     • mupirocin (BACTROBAN) 2 % ointment Apply into the nostril(s) as directed by provider 2 (Two) Times a Day. 22 g 0   • traMADol (ULTRAM) 50 MG tablet As Needed.         PMH:   Past Medical History:   Diagnosis Date   • Arthritis    • Back pain    • History of kidney stones    • Hyperlipidemia     triglycerides   • Hypertension    • Osteoporosis    • Seasonal allergies    • Thyroid disease    • TIA (transient ischemic attack)     \"Possibly\"   • Urinary frequency    • Urinary urgency    • Vertigo    • Wears dentures    • Wears glasses      PSH:    Past Surgical History:   Procedure Laterality Date   • APPENDECTOMY  1977   • CARDIAC CATHETERIZATION     • CHOLECYSTECTOMY     • COLONOSCOPY      COLOGARD SUMMER 2019   • LUMBAR LAMINECTOMY  06/2015    Dr. Domenico Reid, [facility] L2-5   • SPINAL CORD STIMULATOR IMPLANT N/A 10/1/2018    Procedure: SPINAL CORD STIMULATOR INSERTION PHASE 1 ;  Surgeon: Vince Olivas MD;  Location: [facility] OR;  Service: Neurosurgery   • TONSILLECTOMY     • TOTAL KNEE ARTHROPLASTY Right 8/20/2019    Procedure: TOTAL KNEE ARTHROPLASTY RIGHT;  Surgeon: Faustino Weinstein MD;  Location: [facility] OR;  Service: Orthopedics       Social History:   Tobacco:   Social History     Tobacco Use   Smoking Status Former Smoker   • Years: 20.00   • Types: Cigarettes "   • Last attempt to quit: 1980   • Years since quittin.0   Smokeless Tobacco Never Used   Tobacco Comment    Smoking 4 (!!) PPD when patient quit       Alcohol:     Social History     Substance and Sexual Activity   Alcohol Use No       Vitals:       BP:  175/71   HR: 71   SpO2: 99%        Physical Exam:  General Appearance:    Alert, cooperative, no distress, appears stated age   Head:    Normocephalic, without obvious abnormality, atraumatic   Lungs:     Clear to auscultation bilaterally, respirations unlabored    Heart:   Regular rate and rhythm, S1 and S2 normal, no murmur, rub    or gallop    Abdomen:    Soft, non-tender.  +bowel sounds   Breast Exam:    deferred   Genitalia:    deferred   Extremities:   Extremities normal, atraumatic, no cyanosis or edema   Skin:   Skin color, texture, turgor normal, no rashes or lesions   Neurologic:   Grossly intact   Results Review  LABS:  Lab Results   Component Value Date    WBC 5.90 2019    HGB 13.4 2019    HCT 42.5 2019    MCV 93.2 2019     2019    NEUTROABS 3.46 2019    GLUCOSE 98 2019    BUN 15 2019    CREATININE 0.57 2019    EGFRIFNONA 103 2019     2019    K 4.0 2019     2019    CO2 26.0 2019    CALCIUM 10.2 2019       RADIOLOGY:  Imaging Results (Last 72 Hours)     ** No results found for the last 72 hours. **          I reviewed the patient's new clinical results.  ECG dated 19 reviewed.    Impression:   Primary osteoarthritis of the left knee    Plan:   Total knee arthroplasty, left    Krys Rosario PA-C   2020   6:34 AM     Agree with above - plan for left TKA    Faustino Weinstein MD  20  7:24 AM

## 2020-01-03 VITALS
DIASTOLIC BLOOD PRESSURE: 52 MMHG | TEMPERATURE: 98.3 F | RESPIRATION RATE: 18 BRPM | HEART RATE: 87 BPM | OXYGEN SATURATION: 94 % | HEIGHT: 66 IN | SYSTOLIC BLOOD PRESSURE: 136 MMHG | BODY MASS INDEX: 31.14 KG/M2 | WEIGHT: 193.78 LBS

## 2020-01-03 LAB
ANION GAP SERPL CALCULATED.3IONS-SCNC: 11 MMOL/L (ref 5–15)
BUN BLD-MCNC: 18 MG/DL (ref 8–23)
BUN/CREAT SERPL: 34 (ref 7–25)
CALCIUM SPEC-SCNC: 8.7 MG/DL (ref 8.6–10.5)
CHLORIDE SERPL-SCNC: 103 MMOL/L (ref 98–107)
CO2 SERPL-SCNC: 23 MMOL/L (ref 22–29)
CREAT BLD-MCNC: 0.53 MG/DL (ref 0.57–1)
DEPRECATED RDW RBC AUTO: 53 FL (ref 37–54)
ERYTHROCYTE [DISTWIDTH] IN BLOOD BY AUTOMATED COUNT: 15.1 % (ref 12.3–15.4)
GFR SERPL CREATININE-BSD FRML MDRD: 112 ML/MIN/1.73
GLUCOSE BLD-MCNC: 154 MG/DL (ref 65–99)
GLUCOSE BLDC GLUCOMTR-MCNC: 108 MG/DL (ref 70–130)
GLUCOSE BLDC GLUCOMTR-MCNC: 123 MG/DL (ref 70–130)
HCT VFR BLD AUTO: 29.6 % (ref 34–46.6)
HGB BLD-MCNC: 9.5 G/DL (ref 12–15.9)
MCH RBC QN AUTO: 30.4 PG (ref 26.6–33)
MCHC RBC AUTO-ENTMCNC: 32.1 G/DL (ref 31.5–35.7)
MCV RBC AUTO: 94.9 FL (ref 79–97)
PLATELET # BLD AUTO: 234 10*3/MM3 (ref 140–450)
PMV BLD AUTO: 9.3 FL (ref 6–12)
POTASSIUM BLD-SCNC: 3.8 MMOL/L (ref 3.5–5.2)
RBC # BLD AUTO: 3.12 10*6/MM3 (ref 3.77–5.28)
SODIUM BLD-SCNC: 137 MMOL/L (ref 136–145)
WBC NRBC COR # BLD: 10.75 10*3/MM3 (ref 3.4–10.8)

## 2020-01-03 PROCEDURE — 99024 POSTOP FOLLOW-UP VISIT: CPT | Performed by: ORTHOPAEDIC SURGERY

## 2020-01-03 PROCEDURE — 25010000002 MORPHINE PER 10 MG: Performed by: ORTHOPAEDIC SURGERY

## 2020-01-03 PROCEDURE — 97116 GAIT TRAINING THERAPY: CPT | Performed by: PHYSICAL THERAPIST

## 2020-01-03 PROCEDURE — 97110 THERAPEUTIC EXERCISES: CPT | Performed by: PHYSICAL THERAPIST

## 2020-01-03 PROCEDURE — 97165 OT EVAL LOW COMPLEX 30 MIN: CPT

## 2020-01-03 PROCEDURE — 85027 COMPLETE CBC AUTOMATED: CPT | Performed by: ORTHOPAEDIC SURGERY

## 2020-01-03 PROCEDURE — 82962 GLUCOSE BLOOD TEST: CPT

## 2020-01-03 PROCEDURE — 80048 BASIC METABOLIC PNL TOTAL CA: CPT | Performed by: NURSE PRACTITIONER

## 2020-01-03 RX ORDER — DOCUSATE SODIUM 100 MG/1
100 CAPSULE, LIQUID FILLED ORAL 2 TIMES DAILY PRN
Qty: 60 CAPSULE | Refills: 0 | Status: SHIPPED | OUTPATIENT
Start: 2020-01-03 | End: 2020-03-16

## 2020-01-03 RX ADMIN — AMLODIPINE BESYLATE 5 MG: 5 TABLET ORAL at 09:00

## 2020-01-03 RX ADMIN — HYDROCODONE BITARTRATE AND ACETAMINOPHEN 1 TABLET: 5; 325 TABLET ORAL at 03:44

## 2020-01-03 RX ADMIN — HYDROCODONE BITARTRATE AND ACETAMINOPHEN 1 TABLET: 5; 325 TABLET ORAL at 10:22

## 2020-01-03 RX ADMIN — ASPIRIN 325 MG: 325 TABLET, COATED ORAL at 09:00

## 2020-01-03 RX ADMIN — METHOCARBAMOL 750 MG: 750 TABLET ORAL at 09:37

## 2020-01-03 RX ADMIN — MELOXICAM 15 MG: 7.5 TABLET ORAL at 09:00

## 2020-01-03 RX ADMIN — SODIUM CHLORIDE, PRESERVATIVE FREE 3 ML: 5 INJECTION INTRAVENOUS at 09:00

## 2020-01-03 RX ADMIN — LEVOTHYROXINE SODIUM 112 MCG: 112 TABLET ORAL at 05:35

## 2020-01-03 RX ADMIN — MORPHINE SULFATE 4 MG: 4 INJECTION, SOLUTION INTRAMUSCULAR; INTRAVENOUS at 05:35

## 2020-01-03 RX ADMIN — SODIUM CHLORIDE 120 ML/HR: 9 INJECTION, SOLUTION INTRAVENOUS at 03:46

## 2020-01-03 NOTE — DISCHARGE SUMMARY
Patient Name: Jewels Venegas  MRN: [id number]  : 1940  DOS: 1/3/2020    Attending: No att. providers found    Primary Care Provider: Renée Multani MD    Date of Admission:.2020  6:07 AM    Date of Discharge:  1/3/2020    Discharge Diagnosis:   Status post total left knee replacement    Primary osteoarthritis of left knee    HTN (hypertension)    HLD (hyperlipidemia)    Hypothyroid    Acute postoperative pain    Acute blood loss anemia, mild, asymptomatic     Prediabetes      Hospital Course  Patient is a 79 y.o. female presented for left total knee arthroplasty by Dr. Weinstein.     She underwent surgery under general anesthesia.  She tolerated surgery well and was admitted for further medical management.     She is known to us from previous admission in 2019 for right knee replacement; which she recovered well.    Patient was provided pain medications as needed for pain control, along with adductor canal nerve block infusion of Ropivacaine.    Adjustments were made to pain medications to optimize postop pain management. Risks and benefits of opiate medications discussed with patient.    She was seen by PT and OT and has progressed well over her stay.  She used an IS for atelectasis prophylaxis and aspirin along with mechanicals for DVT prophylaxis.  Home medications were resumed as appropriate, and labs were monitored and remained fairly stable.     With the progress she has made, she is ready for DC home today.    She will have an Arrow pump ( instructed on it during this admit).  Discussed with patient regarding plan and she shows understanding and agreement.    Patient will have HHPT following discharge.        Procedures Performed  DATE OF PROCEDURE: 20     PREOPERATIVE DIAGNOSIS: left knee arthritis       POSTOPERATIVE DIAGNOSIS:  left knee arthritis     PROCEDURES PERFORMED:   left total knee arthroplasty with [employer] components (#6 narrow posterior stabilized femur,  "#4 tibia, 10 mm polyethylene, with 32 three peg patella).      SURGEON: Faustino Weinstein MD    Pertinent Test Results:    I reviewed the patient's new clinical results.   Results from last 7 days   Lab Units 20  0506   WBC 10*3/mm3 10.75   HEMOGLOBIN g/dL 9.5*   HEMATOCRIT % 29.6*   PLATELETS 10*3/mm3 234     Results for MADHAVI ROBLERO (MRN [id number]) as of 1/3/2020 16:26   Ref. Range 2019 12:17   Hemoglobin Latest Ref Range: 12.0 - 15.9 g/dL 13.4   Hematocrit Latest Ref Range: 34.0 - 46.6 % 42.5     Results from last 7 days   Lab Units 20  0506 20  0633   SODIUM mmol/L 137  --    POTASSIUM mmol/L 3.8 3.8   CHLORIDE mmol/L 103  --    CO2 mmol/L 23.0  --    BUN mg/dL 18  --    CREATININE mg/dL 0.53*  --    CALCIUM mg/dL 8.7  --    GLUCOSE mg/dL 154*  --      Results for MADHAVI ROBLERO (MRN [id number]) as of 1/3/2020 16:26   Ref. Range 2020 19:39 1/3/2020 05:06 1/3/2020 08:27 1/3/2020 11:35   Glucose Latest Ref Range: 70 - 130 mg/dL 218 (H) 154 (H) 108 123     I reviewed the patient's new imaging including images and reports.      Physical therapy: Pt able to amb 350' with RW CGA with step through gait mechanics. Pt limited by fatigue. Progressed patient's HEP and patient tolerated well - provided handout. Recommend home with assist and OP PT.     Discharge Assessment:    Vital Signs  Visit Vitals  /52 (BP Location: Right arm, Patient Position: Sitting)   Pulse 87   Temp 98.3 °F (36.8 °C) (Oral)   Resp 18   Ht 168.9 cm (66.5\")   Wt 87.9 kg (193 lb 12.6 oz)   SpO2 94%   BMI 30.81 kg/m²     Temp (24hrs), Av.1 °F (36.7 °C), Min:97.9 °F (36.6 °C), Max:98.3 °F (36.8 °C)      General Appearance:    Alert, cooperative, in no acute distress   Lungs:     Clear to auscultation,respirations regular, even and                   unlabored    Heart:    Regular rhythm and normal rate, normal S1 and S2   Abdomen:     Normal bowel sounds, no masses, no organomegaly, soft        " non-tender, non-distended, no guarding, no rebound                 tenderness   Extremities:   Moves all extremities well, no edema, no cyanosis, no              Redness. Left knee ACE wrap CDI. Nerve block present   Pulses:   Pulses palpable and equal bilaterally   Skin:   No bleeding, bruising or rash   Neurologic:   Cranial nerves 2 - 12 grossly intact, sensation intact. Flexion and dorsiflexion intact bilateral feet.       Discharge Disposition: Home    Discharge Medications     Discharge Medications      New Medications      Instructions Start Date   aspirin  MG tablet   325 mg, Oral, Daily   Start Date:  January 4, 2020     HYDROcodone-acetaminophen 5-325 MG per tablet  Commonly known as:  NORCO   1 tablet, Oral, Every 4 Hours PRN      Ropivacine HCl-NaCl  Commonly known as:  NAROPIN   10 mL/hr (20 mg/hr), Peripheral Nerve, Continuous      STOOL SOFTENER 100 MG capsule  Generic drug:  docusate sodium   100 mg, Oral, 2 Times Daily PRN         Changes to Medications      Instructions Start Date   EPICERAM lotion  What changed:    · how much to take  · how to take this  · when to take this   Apply twice daily to back         Continue These Medications      Instructions Start Date   acetaminophen 500 MG tablet  Commonly known as:  TYLENOL   1,000 mg, Oral, 2 Times Daily      amLODIPine 5 MG tablet  Commonly known as:  NORVASC   5 mg, Oral, Daily      atorvastatin 20 MG tablet  Commonly known as:  LIPITOR   10 mg, Oral, 2 Times Weekly, MON AND THURS      FLONASE 50 MCG/ACT nasal spray  Generic drug:  fluticasone   2 sprays, Nasal, As Needed      HYDROCHLOROTHIAZIDE PO   12.5 mg, Oral, Daily      Krill Oil 500 MG capsule   1 tablet, Oral, Daily      levocetirizine 5 MG tablet  Commonly known as:  XYZAL   5 mg, Oral, Every Evening      levothyroxine 112 MCG tablet  Commonly known as:  SYNTHROID, LEVOTHROID   112 mcg, Oral, Daily      methocarbamol 750 MG tablet  Commonly known as:  ROBAXIN   750 mg, Oral, 2  Times Daily      multivitamin with minerals tablet tablet   1 tablet, Oral, Daily         Stop These Medications    traMADol 50 MG tablet  Commonly known as:  ULTRAM            Discharge Diet: Consistent carb diet    Activity at Discharge: WBAT LLE    Follow-up Appointments  Dr. Weinstein per his orders    Discharge took over 30 min.    AMITA Parekh  01/03/20  4:26 PM

## 2020-01-03 NOTE — PLAN OF CARE
Problem: Patient Care Overview  Goal: Plan of Care Review  Flowsheets (Taken 1/3/2020 2539)  Outcome Summary: OT IE completed. Pt alert and motivated to work with therapy; reports good pain control. CGA mobility and transfers with RW. Education reinforced for surgical precautions, use of AE and RW safety/fall prevention. Pt denies DME/AD/AE needs at d/c. Plan is home (anticipate today) with family support and outpatient PT.

## 2020-01-03 NOTE — THERAPY DISCHARGE NOTE
"Acute Care - Occupational Therapy Initial Eval/Discharge  [facility]     Patient Name: Jewels Venegas  : 1940  MRN: [id number]  Today's Date: 1/3/2020  Onset of Illness/Injury or Date of Surgery: 20  Date of Referral to OT: 20  Referring Physician: Js      Admit Date: 2020       ICD-10-CM ICD-9-CM   1. Status post total left knee replacement Z96.652 V43.65   2. Primary osteoarthritis of left knee M17.12 715.16   3. Impaired mobility and ADLs Z74.09 799.89     Patient Active Problem List   Diagnosis   • Post laminectomy syndrome   • S/P insertion of spinal cord stimulator   • Osteoporosis   • Lumbar stenosis with neurogenic claudication   • Polyneuropathy   • Spondylarthrosis   • Benign paroxysmal positional vertigo   • Insomnia due to medical condition   • At high risk for falls   • Physical deconditioning   • Encounter for fitting and adjustment of neuropacemaker of spinal cord   • Primary osteoarthritis of both knees   • Primary osteoarthritis of right knee   • Status post total left knee replacement   • HTN (hypertension)   • HLD (hyperlipidemia)   • Hypothyroid   • Leukocytosis, mild, likely reactive   • Acute postoperative pain   • Acute blood loss anemia, mild, asymptomatic    • Primary osteoarthritis of left knee   • Prediabetes     Past Medical History:   Diagnosis Date   • Arthritis    • Back pain    • History of kidney stones    • Hyperlipidemia     triglycerides   • Hypertension    • Osteoporosis    • Seasonal allergies    • Thyroid disease    • TIA (transient ischemic attack)     \"Possibly\"   • Urinary frequency    • Urinary urgency    • Vertigo    • Wears dentures    • Wears glasses      Past Surgical History:   Procedure Laterality Date   • APPENDECTOMY     • CARDIAC CATHETERIZATION     • CHOLECYSTECTOMY     • COLONOSCOPY      COLOGARD SUMMER 2019   • LUMBAR LAMINECTOMY  2015    Dr. Domenico Reid, [facility] L2-5   • SPINAL CORD STIMULATOR IMPLANT " N/A 10/1/2018    Procedure: SPINAL CORD STIMULATOR INSERTION PHASE 1 ;  Surgeon: Vince Olivas MD;  Location:  [facility];  Service: Neurosurgery   • TONSILLECTOMY     • TOTAL KNEE ARTHROPLASTY Right 8/20/2019    Procedure: TOTAL KNEE ARTHROPLASTY RIGHT;  Surgeon: Faustino Weinstein MD;  Location:  [facility];  Service: Orthopedics          OT ASSESSMENT FLOWSHEET (last 12 hours)      Occupational Therapy Evaluation     Row Name 01/03/20 0741                   OT Evaluation Time/Intention    Subjective Information  no complaints  -TB        Document Type  evaluation;discharge evaluation/summary  -TB        Mode of Treatment  occupational therapy;individual therapy  -TB        Patient Effort  good  -TB        Symptoms Noted During/After Treatment  none  -TB           General Information    Patient Profile Reviewed?  yes  -TB        Onset of Illness/Injury or Date of Surgery  01/02/20  -TB        Referring Physician  Js  -KARLY        Prior Level of Function  min assist:;ADL's pain limits mobility and self-care  -TB        Equipment Currently Used at Home  grab bar;raised toilet;shower chair;walker, rolling rails at commode  -TB        Pertinent History of Current Functional Problem  s/p L TKA. h/o R TKA 8/2019  -TB        Existing Precautions/Restrictions  fall;other (see comments) L adductor canal block  -TB        Limitations/Impairments  safety/cognitive  -TB        Barriers to Rehab  previous functional deficit  -TB           Relationship/Environment    Primary Source of Support/Comfort  child(darius);sibling(s)  -TB        Lives With  alone  -TB        Family Caregiver if Needed  child(darius), adult;sibling(s)  -TB        Concerns About Impact on Relationships  Daughter will stay in pt's home to assist at d/c  -TB           Resource/Environmental Concerns    Current Living Arrangements  home/apartment/condo  -TB           Home Main Entrance    Number of Stairs, Main Entrance  none  -TB           Stairs Within Home,  Primary    Number of Stairs, Within Home, Primary  none  -TB           Cognitive Assessment/Intervention- PT/OT    Orientation Status (Cognition)  oriented x 4  -TB        Follows Commands (Cognition)  over 90% accuracy;verbal cues/prompting required  -TB        Safety Deficit (Cognitive)  safety precautions awareness;safety precautions follow-through/compliance  -TB           Safety Issues, Functional Mobility    Safety Issues Affecting Function (Mobility)  safety precaution awareness;safety precautions follow-through/compliance  -TB        Impairments Affecting Function (Mobility)  pain;range of motion (ROM);strength  -TB           Mobility Assessment/Treatment    Extremity Weight-bearing Status  left lower extremity  -TB        Left Lower Extremity (Weight-bearing Status)  weight-bearing as tolerated (WBAT)  -TB           Bed Mobility Assessment/Treatment    Bed Mobility Assessment/Treatment  supine-sit  -TB        Supine-Sit [place] (Bed Mobility)  supervision  -TB        Bed Mobility, Safety Issues  decreased use of legs for bridging/pushing  -TB        Assistive Device (Bed Mobility)  head of bed elevated  -TB        Comment (Bed Mobility)  (+) SLR  -TB           Functional Mobility    Functional Mobility- Ind. Level  contact guard assist  -TB        Functional Mobility- Device  rolling walker  -TB        Functional Mobility- Safety Issues  step length decreased;weight-shifting ability decreased;balance decreased during turns  -TB           Transfer Assessment/Treatment    Transfer Assessment/Treatment  sit-stand transfer;stand-sit transfer;bed-chair transfer;toilet transfer  -TB        Comment (Transfers)  cues for hand placement, sequencing and safety  -TB           Bed-Chair Transfer    Bed-Chair [place] (Transfers)  contact guard;verbal cues  -TB        Assistive Device (Bed-Chair Transfers)  walker, front-wheeled  -TB           Sit-Stand Transfer    Sit-Stand [place] (Transfers)  contact  guard;verbal cues  -TB        Assistive Device (Sit-Stand Transfers)  walker, front-wheeled  -TB           Stand-Sit Transfer    Stand-Sit [place] (Transfers)  stand by assist;verbal cues  -TB        Assistive Device (Stand-Sit Transfers)  walker, front-wheeled  -TB           Toilet Transfer    Type (Toilet Transfer)  sit-stand;stand-sit  -TB        [place] Level (Toilet Transfer)  contact guard;verbal cues  -TB        Assistive Device (Toilet Transfer)  grab bars/safety frame;raised toilet seat;walker, front-wheeled  -TB           ADL Assessment/Intervention    BADL Assessment/Intervention  toileting;grooming;lower body dressing  -TB           Lower Body Dressing Assessment/Training    Comment (Lower Body Dressing)  Pt has AE at home from previous knee surgery; education reinforced for surgical precautions and use of AE to maximize independence  -TB           Grooming Assessment/Training    [place] Level (Grooming)  supervision to wash/dry hands  -TB        Grooming Position  sink side  -TB        Comment (Grooming)  Education reinforced for RW positioning and safety at sink side to discourage twisting/pivoting on LLE  -TB           Toileting Assessment/Training    [place] Level (Toileting)  adjust/manage clothing;contact guard assist;perform perineal hygiene;independent  -TB        Assistive Devices (Toileting)  grab bar/safety frame;raised toilet seat  -TB        Toileting Position  unsupported sitting;supported standing  -TB           BADL Safety/Performance    Impairments, BADL Safety/Performance  pain;range of motion;strength  -TB        Skilled BADL Treatment/Intervention  BADL process/adaptation training  -TB           General ROM    GENERAL ROM COMMENTS  BUE AROM WFL for ADLs  -TB           MMT (Manual Muscle Testing)    General MMT Comments  BUE intact for ADLs  -TB           Motor Assessment/Interventions    Additional Documentation  Balance (Group);Therapeutic Exercise (Group)  -TB            Therapeutic Exercise    Therapeutic Exercise  seated, upper extremities  -TB        Additional Documentation  Therapeutic Exercise (Row)  -TB           Upper Extremity Seated Therapeutic Exercise    Performed, Seated Upper Extremity (Therapeutic Exercise)  shoulder flexion/extension;shoulder abduction/adduction;shoulder external/internal rotation;shoulder horizontal abduction/adduction;elbow flexion/extension;wrist flexion/extension;digit flexion/extension  -TB        Exercise Type, Seated Upper Extremity (Therapeutic Exercise)  AROM (active range of motion)  -TB        Restrictions, Seated Upper Extremity (Therapeutic Exercise)  Pt uses BUE freely to complete ADL tasks  -TB           Balance    Balance  dynamic sitting balance;dynamic standing balance  -TB           Dynamic Sitting Balance    Level of [place], Reaches Outside Midline (Sitting, Dynamic Balance)  supervision  -TB        Sitting Position, Reaches Outside Midline (Sitting, Dynamic Balance)  sitting in chair;sitting on edge of bed commode  -TB        Comment, Reaches Outside Midline (Sitting, Dynamic Balance)  ADL tasks  -TB           Dynamic Standing Balance    Level of [place], Reaches Outside Midline (Standing, Dynamic Balance)  contact guard assist  -TB        Time Able to Maintain Position, Reaches Outside Midline (Standing, Dynamic Balance)  2 to 3 minutes  -TB        Assistive Device Utilized (Supported Standing, Dynamic Balance)  walker, rolling  -TB        Comment, Reaches Outside Midline (Standing, Dynamic Balance)  ADL tasks  -TB           Sensory Assessment/Intervention    Sensory General Assessment  no sensation deficits identified BUE intact  -TB           Positioning and Restraints    Pre-Treatment Position  in bed  -TB        Post Treatment Position  chair  -TB        In Chair  reclined;call light within reach;encouraged to call for assist;exit alarm on;with family/caregiver;legs elevated Lines intact  -TB           Pain  Assessment    Additional Documentation  Pain Scale: Numbers Pre/Post-Treatment (Group)  -TB           Pain Scale: Numbers Pre/Post-Treatment    Pain Scale: Numbers, Pretreatment  4/10  -TB        Pain Scale: Numbers, Post-Treatment  1/10  -TB        Pain Location - Side  Left  -TB        Pain Location - Orientation  anterior  -TB        Pain Location  knee  -TB        Pre/Post Treatment Pain Comment  Pt tolerates OOB activity well  -TB        Pain Intervention(s)  Ambulation/increased activity;Repositioned;Medication (See MAR) L adductor canal block  -TB           Wound 01/02/20 0818 Left anterior knee Incision    Wound - Properties Group Date first assessed: 01/02/20  -TK Time first assessed: 0818  -TK Side: Left  -TK Orientation: anterior  -TK Location: knee  -TK Primary Wound Type: Incision  -TK       Coping    Observed Emotional State  accepting;cooperative  -TB        Verbalized Emotional State  acceptance  -TB           Plan of Care Review    Plan of Care Reviewed With  patient;daughter  -TB           Clinical Impression (OT)    Date of Referral to OT  01/02/20  -TB        OT Diagnosis  Impaired mobility and ADL  -TB        Patient/Family Goals Statement (OT Eval)  to return home today with referral to outpatient PT  -TB        Therapy Frequency (OT Eval)  evaluation only Anticipate d/c to home today following PT  -TB        Care Plan Review (OT)  evaluation/treatment results reviewed;risks/benefits reviewed;patient/other agree to care plan  -TB        Care Plan Review, Other Participant (OT Eval)  daughter  -TB        Anticipated Equipment Needs at Discharge (OT)  -- None  -TB        Patient/Family Concerns, Equipment Needs at Discharge (OT)  Pt has all necessary AE/DME/AD   -TB        Anticipated Discharge Disposition (OT)  home with assist;home with OP services  -TB           Vital Signs    Pre Systolic BP Rehab  -- RN cleared OT  -TB        Pre Patient Position  Supine  -TB        Intra Patient Position   Standing  -TB        Post Patient Position  Sitting  -TB           Discharge Summary (Occupational Therapy)    Additional Documentation  Discharge Summary, OT Eval (Group)  -TB           Discharge Summary, OT Eval    Reason for Discharge (OT Discharge Summary)  no further needs identified;patient discharged from this facility Anticipate d/c to home today following PT  -TB          User Key  (r) = Recorded By, (t) = Taken By, (c) = Cosigned By    Initials Name Effective Dates    Nenita Corrigan OT 06/08/18 -     TK Sari Waddell RN 06/16/16 -               OT Recommendation and Plan  Outcome Summary/Treatment Plan (OT)  Anticipated Equipment Needs at Discharge (OT): (None)  Patient/Family Concerns, Equipment Needs at Discharge (OT): Pt has all necessary AE/DME/AD   Anticipated Discharge Disposition (OT): home with assist, home with OP services  Reason for Discharge (OT Discharge Summary): no further needs identified, patient discharged from this facility(Anticipate d/c to home today following PT)  Therapy Frequency (OT Eval): evaluation only(Anticipate d/c to home today following PT)  Plan of Care Review  Plan of Care Reviewed With: patient, daughter  Plan of Care Reviewed With: patient, daughter  Outcome Summary: OT IE completed. Pt alert and motivated to work with therapy; reports good pain control. CGA mobility and transfers with RW. Education reinforced for surgical precautions, use of AE and RW safety/fall prevention. Pt denies DME/AD/AE needs at d/c. Plan is home (anticipate today) with family support and outpatient PT.         Outcome Measures     Row Name 01/03/20 0741             How much help from another is currently needed...    Putting on and taking off regular lower body clothing?  2  -TB      Bathing (including washing, rinsing, and drying)  3  -TB      Toileting (which includes using toilet bed pan or urinal)  3  -TB      Putting on and taking off regular upper body clothing  3  -TB       Taking care of personal grooming (such as brushing teeth)  3  -TB      Eating meals  4  -TB      AM-PAC 6 Clicks Score (OT)  18  -TB         Functional Assessment    Outcome Measure Options  AM-PAC 6 Clicks Daily Activity (OT)  -TB        User Key  (r) = Recorded By, (t) = Taken By, (c) = Cosigned By    Initials Name Provider Type    TB Nenita Stallworth, OT Occupational Therapist          Time Calculation:   Time Calculation- OT     Row Name 01/03/20 0840             Time Calculation- OT    OT Start Time  0741  -TB      OT Received On  01/03/20  -        User Key  (r) = Recorded By, (t) = Taken By, (c) = Cosigned By    Initials Name Provider Type     Nenita Stallworth OT Occupational Therapist        Therapy Suggested Charges     Code   Minutes Charges    None           Therapy Charges for Today     Code Description Service Date Service Provider Modifiers Qty    [id number]  OT EVAL LOW COMPLEXITY 4 1/3/2020 Nenita Stallworth OT GO 1               OT Discharge Summary  Anticipated Discharge Disposition (OT): home with assist, home with OP services    Nenita Stallworth OT  1/3/2020

## 2020-01-03 NOTE — THERAPY DISCHARGE NOTE
"Patient Name: Jewels Venegas  : 1940    MRN: [id number]                              Today's Date: 1/3/2020       Admit Date: 2020    Visit Dx:     ICD-10-CM ICD-9-CM   1. Status post total left knee replacement Z96.652 V43.65   2. Primary osteoarthritis of left knee M17.12 715.16   3. Impaired mobility and ADLs Z74.09 799.89     Patient Active Problem List   Diagnosis   • Post laminectomy syndrome   • S/P insertion of spinal cord stimulator   • Osteoporosis   • Lumbar stenosis with neurogenic claudication   • Polyneuropathy   • Spondylarthrosis   • Benign paroxysmal positional vertigo   • Insomnia due to medical condition   • At high risk for falls   • Physical deconditioning   • Encounter for fitting and adjustment of neuropacemaker of spinal cord   • Primary osteoarthritis of both knees   • Primary osteoarthritis of right knee   • Status post total left knee replacement   • HTN (hypertension)   • HLD (hyperlipidemia)   • Hypothyroid   • Leukocytosis, mild, likely reactive   • Acute postoperative pain   • Acute blood loss anemia, mild, asymptomatic    • Primary osteoarthritis of left knee   • Prediabetes     Past Medical History:   Diagnosis Date   • Arthritis    • Back pain    • History of kidney stones    • Hyperlipidemia     triglycerides   • Hypertension    • Osteoporosis    • Seasonal allergies    • Thyroid disease    • TIA (transient ischemic attack)     \"Possibly\"   • Urinary frequency    • Urinary urgency    • Vertigo    • Wears dentures    • Wears glasses      Past Surgical History:   Procedure Laterality Date   • APPENDECTOMY     • CARDIAC CATHETERIZATION     • CHOLECYSTECTOMY     • COLONOSCOPY      COLOGARD SUMMER 2019   • LUMBAR LAMINECTOMY  2015    Dr. Domenico Reid, [facility] L2-5   • SPINAL CORD STIMULATOR IMPLANT N/A 10/1/2018    Procedure: SPINAL CORD STIMULATOR INSERTION PHASE 1 ;  Surgeon: Vince Olivas MD;  Location: [facility];  Service: Neurosurgery "   • TONSILLECTOMY     • TOTAL KNEE ARTHROPLASTY Right 8/20/2019    Procedure: TOTAL KNEE ARTHROPLASTY RIGHT;  Surgeon: Faustino Weinstein MD;  Location: [facility];  Service: Orthopedics     General Information     Row Name 01/03/20 0835          PT Evaluation Time/Intention    Document Type  therapy note (daily note)  -CS     Mode of Treatment  individual therapy;physical therapy  -CS     Row Name 01/03/20 0835          General Information    Patient Profile Reviewed?  yes  -CS     Existing Precautions/Restrictions  fall;other (see comments) L LE adductor canal catheter  -CS     Row Name 01/03/20 0835          Cognitive Assessment/Intervention- PT/OT    Orientation Status (Cognition)  oriented x 4  -CS     Row Name 01/03/20 0835          Safety Issues, Functional Mobility    Safety Issues Affecting Function (Mobility)  safety precautions follow-through/compliance;safety precaution awareness  -CS     Impairments Affecting Function (Mobility)  endurance/activity tolerance;pain;range of motion (ROM);strength  -CS       User Key  (r) = Recorded By, (t) = Taken By, (c) = Cosigned By    Initials Name Provider Type    CS Stephanie Frye, PT Physical Therapist        Mobility     Row Name 01/03/20 0835          Bed Mobility Assessment/Treatment    Comment (Bed Mobility)  Pt UIC at start and end of therapy session.  -CS     Row Name 01/03/20 0835          Transfer Assessment/Treatment    Comment (Transfers)  VC's to push off of chair to stand and to reach back for chair to sit. Pt stepped L LE out with sitting with no VC's.  -CS     Row Name 01/03/20 0835          Sit-Stand Transfer    Sit-Stand [place] (Transfers)  stand by assist;verbal cues  -     Assistive Device (Sit-Stand Transfers)  walker, front-wheeled  -CS     Row Name 01/03/20 0835          Gait/Stairs Assessment/Training    85002 - Gait Training Minutes   15  -CS     Gait/Stairs Assessment/Training  gait/ambulation independence;gait/ambulation assistive  device;distance ambulated;gait pattern  -CS     [place] Level (Gait)  contact guard;verbal cues  -CS     Assistive Device (Gait)  walker, front-wheeled  -CS     Distance in Feet (Gait)  350'  -CS     Pattern (Gait)  step-through  -CS     Deviations/Abnormal Patterns (Gait)  bilateral deviations;giuseppe decreased;gait speed decreased;stride length decreased  -CS     Bilateral Gait Deviations  forward flexed posture  -CS     Comment (Gait/Stairs)  Pt amb 350' with RW CGA with step through gait mechancis with forward flexed posture and slow speed. Pt able to recall to perform adequate heel strike. VC's to maintain upright posture and to stay within walker. Improved with cues. Gait limited by fatigue.   -CS     Row Name 01/03/20 0835          Mobility Assessment/Intervention    Extremity Weight-bearing Status  left lower extremity  -CS     Left Lower Extremity (Weight-bearing Status)  weight-bearing as tolerated (WBAT)  -CS       User Key  (r) = Recorded By, (t) = Taken By, (c) = Cosigned By    Initials Name Provider Type    Stephanie Chu, JENNIFER Physical Therapist        Obj/Interventions     Row Name 01/03/20 0835          General ROM    GENERAL ROM COMMENTS  0-105 degrees of knee AAROM  -CS     Row Name 01/03/20 0835          Therapeutic Exercise    Lower Extremity (Therapeutic Exercise)  gluteal sets;quad sets, bilateral;heel slides, left;LAQ (long arc quad), left;SAQ (short arc quad), left;SLR (straight leg raise), left  -CS     Lower Extremity Range of Motion (Therapeutic Exercise)  ankle dorsiflexion/plantar flexion, bilateral  -CS     Exercise Type (Therapeutic Exercise)  isometric contraction, static;AAROM (active assistive range of motion);AROM (active range of motion);isotonic contraction, concentric  -CS     Position (Therapeutic Exercise)  supine  -CS     Sets/Reps (Therapeutic Exercise)  15 reps each   -CS     Comment (Therapeutic Exercise)  VC's for technique   -     Row Name 01/03/20 0855           Sensory Assessment/Intervention    Sensory General Assessment  no sensation deficits identified  -       User Key  (r) = Recorded By, (t) = Taken By, (c) = Cosigned By    Initials Name Provider Type    CS Stephanie Frye, PT Physical Therapist        Goals/Plan     Row Name 01/03/20 0835          Bed Mobility Goal 1 (PT)    Activity/Assistive Device (Bed Mobility Goal 1, PT)  sit to supine/supine to sit  -CS     [place] Level/Cues Needed (Bed Mobility Goal 1, PT)  conditional independence  -CS     Time Frame (Bed Mobility Goal 1, PT)  long term goal (LTG);3 days  -CS     Progress/Outcomes (Bed Mobility Goal 1, PT)  goal partially met;discharged from [facility]  -     Row Name 01/03/20 0835          Transfer Goal 1 (PT)    Activity/Assistive Device (Transfer Goal 1, PT)  sit-to-stand/stand-to-sit;walker, rolling  -CS     [place] Level/Cues Needed (Transfer Goal 1, PT)  conditional independence  -CS     Time Frame (Transfer Goal 1, PT)  long term goal (LTG);3 days  -CS     Progress/Outcome (Transfer Goal 1, PT)  goal partially met;discharged from [facility]  -[facility] Name 01/03/20 0835          Gait Training Goal 1 (PT)    Activity/Assistive Device (Gait Training Goal 1, PT)  gait (walking locomotion);walker, rolling  -CS     [place] Level (Gait Training Goal 1, PT)  conditional independence  -CS     Distance (Gait Goal 1, PT)  500 feet  -CS     Time Frame (Gait Training Goal 1, PT)  long term goal (LTG);3 days  -CS     Progress/Outcome (Gait Training Goal 1, PT)  goal partially met;discharged from [facility]  -[facility] Name 01/03/20 0835          ROM Goal 1 (PT)    ROM Goal 1 (PT)  0-90 degrees L knee AAROM  -CS     Time Frame (ROM Goal 1, PT)  long term goal (LTG);3 days  -CS     Progress/Outcome (ROM Goal 1, PT)  goal met;discharged from [facility]  -       User Key  (r) = Recorded By, (t) = Taken By, (c) = Cosigned By    Initials Name Provider Type    Stephanie Chu, PT Physical  Therapist        Clinical Impression     Row Name 01/03/20 0835          Pain Assessment    Additional Documentation  Pain Scale: Numbers Pre/Post-Treatment (Group)  -CS     Row Name 01/03/20 0835          Pain Scale: Numbers Pre/Post-Treatment    Pain Scale: Numbers, Pretreatment  2/10  -CS     Pain Scale: Numbers, Post-Treatment  2/10  -CS     Pain Location - Side  Left  -CS     Pain Location - Orientation  anterior  -CS     Pain Location  knee  -CS     Pain Intervention(s)  Repositioned;Ambulation/increased activity;Cold applied  -CS     Row Name 01/03/20 0835          Plan of Care Review    Plan of Care Reviewed With  patient;daughter  -CS     Progress  improving  -CS     Outcome Summary  Pt able to amb 350' with RW CGA with step through gait mechanics. Pt limited by fatigue. Progressed patient's HEP and patient tolerated well - provided handout. Recommend home with assist and OP PT.   -CS     Row Name 01/03/20 0835          Physical Therapy Clinical Impression    Patient/Family Goals Statement (PT Clinical Impression)  To go home.   -CS     Criteria for Skilled Interventions Met (PT Clinical Impression)  yes;treatment indicated  -CS     Rehab Potential (PT Clinical Summary)  good, to achieve stated therapy goals  -CS     Predicted Duration of Therapy (PT)  3 days   -CS     Row Name 01/03/20 0835          Positioning and Restraints    Pre-Treatment Position  sitting in chair/recliner  -CS     Post Treatment Position  chair  -CS     In Chair  notified nsg;reclined;call light within reach;encouraged to call for assist;exit alarm on;with family/caregiver;legs elevated  -CS       User Key  (r) = Recorded By, (t) = Taken By, (c) = Cosigned By    Initials Name Provider Type    Stephanie Chu, PT Physical Therapist        Outcome Measures     Row Name 01/03/20 0835          How much help from another person do you currently need...    Turning from your back to your side while in flat bed without using bedrails?   3  -CS     Moving from lying on back to sitting on the side of a flat bed without bedrails?  3  -CS     Moving to and from a bed to a chair (including a wheelchair)?  4  -CS     Standing up from a chair using your arms (e.g., wheelchair, bedside chair)?  4  -CS     Climbing 3-5 steps with a railing?  3  -CS     To walk in hospital room?  3  -CS     AM-PAC 6 Clicks Score (PT)  20  -CS     Row Name 01/03/20 0835          Functional Assessment    Outcome Measure Options  AM-PAC 6 Clicks Basic Mobility (PT)  -CS       User Key  (r) = Recorded By, (t) = Taken By, (c) = Cosigned By    Initials Name Provider Type    Stephanie Chu PT Physical Therapist          PT Recommendation and Plan  Planned Therapy Interventions (PT Eval): balance training, bed mobility training, gait training, home exercise program, neuromuscular re-education, patient/family education, ROM (range of motion), strengthening, transfer training  Outcome Summary/Treatment Plan (PT)  Anticipated Discharge Disposition (PT): home with assist, home with OP services  Plan of Care Reviewed With: patient, daughter  Progress: improving  Outcome Summary: Pt able to amb 350' with RW CGA with step through gait mechanics. Pt limited by fatigue. Progressed patient's HEP and patient tolerated well - provided handout. Recommend home with assist and OP PT.      Time Calculation:   PT Charges     Row Name 01/03/20 0835             Time Calculation    Start Time  0835  -      PT Received On  01/03/20  -         Time Calculation- PT    Total Timed Code Minutes- PT  25 minute(s)  -CS         Timed Charges    13206 - PT Therapeutic Exercise Minutes  10  -CS      24998 - Gait Training Minutes   15  -CS        User Key  (r) = Recorded By, (t) = Taken By, (c) = Cosigned By    Initials Name Provider Type    Stephanie Chu, JENNIFER Physical Therapist        Therapy Charges for Today     Code Description Service Date Service Provider Modifiers Qty    [id number]  HC PT THER PROC EA 15 MIN 1/3/2020 Stephanie Frye, PT GP 1    [id number] HC GAIT TRAINING EA 15 MIN 1/3/2020 Stephanie Frye, PT GP 1          PT G-Codes  Outcome Measure Options: AM-PAC 6 Clicks Basic Mobility (PT)  AM-PAC 6 Clicks Score (PT): 20  AM-PAC 6 Clicks Score (OT): 18    PT Discharge Summary  Anticipated Discharge Disposition (PT): home with assist, home with OP services    Stephanie Frye, PT  1/3/2020

## 2020-01-03 NOTE — PROGRESS NOTES
"      [facility] Orthopaedic Surgery Progress Note    Subjective      LOS: 1 day   Patient Care Team:  Renée Multani MD as PCP - General  Renée Multani MD as PCP - Family Medicine  Apro, Juju Pryor PA-C as Physician Assistant (Physician Assistant)  Doron Gale MD as Consulting Physician (Pain Medicine)  Domenico Reid MD as Consulting Physician (Neurosurgery)    CC: Left knee pain    Interval History:   Patient sitting comfortably in a chair.  She would like to go home today if possible.    Objective      Vital Signs  Temp (24hrs), Av °F (36.7 °C), Min:97.5 °F (36.4 °C), Max:98.3 °F (36.8 °C)      /52 (BP Location: Right arm, Patient Position: Sitting)   Pulse 87   Temp 98.3 °F (36.8 °C) (Oral)   Resp 18   Ht 168.9 cm (66.5\")   Wt 87.9 kg (193 lb 12.6 oz)   SpO2 94%   BMI 30.81 kg/m²     Examination:   Examination of the left knee: The wound is clean, dry, and intact.  Ankle dorsiflexion, ankle plantar flexion, and EHL are intact.  Sensation intact in the foot to light touch.  2+ dorsalis pedis pulse.  Straight leg raise is intact.      Labs:  Results from last 7 days   Lab Units 20  0506   WBC 10*3/mm3 10.75   HEMOGLOBIN g/dL 9.5*   HEMATOCRIT % 29.6*   MCV fL 94.9   PLATELETS 10*3/mm3 234       Radiology:  Imaging Results (Last 24 Hours)     Procedure Component Value Units Date/Time    XR Knee 1 or 2 View Left [[id number]] Collected:  20 1015     Updated:  20 1058    Narrative:       EXAMINATION: XR KNEE 1 OR 2 VW LEFT-      INDICATION: Knee arthroplasty.      COMPARISON: None.     FINDINGS: AP and lateral views of the left knee status post left knee  arthroplasty without complication in excellent and anatomic alignment.            Impression:       Status post left total knee arthroplasty without  complication in excellent anatomic alignment.     D:  2020  E:  2020     This report was finalized on 2020 10:55 AM by Dr. Ezio Hurt.       "       PT:  Physical Therapy - Plan of Care Review - Outcome Summary:  Outcome Summary: Pt able to amb 350' with RW CGA with step through gait mechanics. Pt limited by fatigue. Progressed patient's HEP and patient tolerated well - provided handout. Recommend home with assist and OP PT.  (01/03/20 6755)]       Results Review:     I reviewed the patient's new clinical results.    Assessment and Plan     Assessment:   Status post left total knee arthroplasty-doing well      Status post total left knee replacement    HTN (hypertension)    HLD (hyperlipidemia)    Hypothyroid    Primary osteoarthritis of left knee    Prediabetes      Plan for disposition: Plan for discharge to home today if she is cleared medically and by physical therapy.  Follow-up with me in 3 weeks as planned.     Provider Department Center   1/22/2020 2:20 PM Roberta Mckeon PA-C [facility] ORTHOPEDIC SPORTS MEDICINE    Appt Notes: 3 WEEK POSTOP           Faustino Weinstein MD  01/03/20  10:11 AM

## 2020-01-03 NOTE — PROGRESS NOTES
Continued Stay Note  [facility]     Patient Name: Jewels Venegas  MRN: [id number]  Today's Date: 1/3/2020    Admit Date: 1/2/2020    Discharge Plan     Row Name 01/03/20 1042       Plan    Plan  Home with outpt PT    Plan Comments  I met with Ms Venegas and daughter at bedside to discuss d/c plan. Her plan is to go home. she lives alone, her daughter will stay with her for two days,then her sister is coming to stay with her. she already has equipment needed from previous TKR. Prior to admit she was independent with ADL's. We discussed options for PT, she has made arrangements for outpt PT at [facility] in [place] [phone]. she has an appt on 1/6 at 0930. I spoke with Samantha who confirmed appt. Order faxed to [phone]. No other d/c needs identified    Row Name 01/03/20 1030       Plan    Provided post acute provider list?  Refused she already made arrangements for outpt PT    Patient/Family in Agreement with Plan  yes    Final Discharge Disposition Code  01 - home or self-care        Discharge Codes    No documentation.       Expected Discharge Date and Time     Expected Discharge Date Expected Discharge Time    Jan 5, 2020             Sonja C Kellerman, RN

## 2020-01-03 NOTE — PROGRESS NOTES
[facility]    Acute pain service Inpatient Progress Note    Patient Name: Jewels Venegas  :  1940  MRN:  [id number]        Acute Pain  Service Inpatient Progress Note:    Analgesia:Fair  LOC: alert and awake  Resp Status: room air  Cardiac: VS stable  Side Effects:None  Catheter Site:clean, dressing intact and dry  Cath type: peripheral nerve cath with ON Q  Infusion rate: 10ml/hr  Dosing/Volume: bupivacaine 0.25% (10ml)  Catheter Plan:Catheter to remain Insitu, Continue catheter infusion rate unchanged and Bolus Catheter  Comments: ---------------------------------------------------------------------------------  Physical Therapy Manual Muscle Testing Results:  MMT (Manual Muscle Testing)  General MMT Comments: BUE intact for ADLs (20 92)]    Physical Therapy - Plan of Care Review - Outcome Summary:  Outcome Summary: Pt able to amb 350' with RW CGA with step through gait mechanics. Pt limited by fatigue. Progressed patient's HEP and patient tolerated well - provided handout. Recommend home with assist and OP PT.  (20 9390)]    Occupational Therapy - Plan of Care Review - Outcome Summary:  Outcome Summary: OT IE completed. Pt alert and motivated to work with therapy; reports good pain control. CGA mobility and transfers with RW. Education reinforced for surgical precautions, use of AE and RW safety/fall prevention. Pt denies DME/AD/AE needs at d/c. Plan is home (anticipate today) with family support and outpatient PT. (20 4721)]  ----------------------------------------------------------------------------------

## 2020-01-03 NOTE — PLAN OF CARE
Problem: Patient Care Overview  Goal: Plan of Care Review  Outcome: Ongoing (interventions implemented as appropriate)  Flowsheets (Taken 1/3/2020 3547)  Progress: improving  Plan of Care Reviewed With: patient; daughter   VSS, ambulating , pain well controlled with prn meds. Good uop, daughter at bedside , dressing CDI

## 2020-01-03 NOTE — PLAN OF CARE
Problem: Patient Care Overview  Goal: Plan of Care Review  Outcome: Ongoing (interventions implemented as appropriate)  Flowsheets  Taken 1/3/2020 0919  Progress: improving  Taken 1/3/2020 0835  Plan of Care Reviewed With: patient;daughter  Outcome Summary: Pt able to amb 350' with RW CGA with step through gait mechanics. Pt limited by fatigue. Progressed patient's HEP and patient tolerated well - provided handout. Recommend home with assist and OP PT.

## 2020-01-05 NOTE — PROGRESS NOTES
HANSA Prieto    Nerve Cath Post Op Call    Patient Name: Jewels Venegas  :  1940  MRN:  [id number]  Date of Discharge: 1/3/2020    Nerve Cath Post Op Call:    Catheter Plan:Patient/Family member report nerve catheter previously discontinued, tip intact

## 2020-01-22 ENCOUNTER — OFFICE VISIT (OUTPATIENT)
Dept: ORTHOPEDIC SURGERY | Facility: CLINIC | Age: 80
End: 2020-01-22

## 2020-01-22 DIAGNOSIS — Z96.652 STATUS POST TOTAL LEFT KNEE REPLACEMENT: Primary | ICD-10-CM

## 2020-01-22 PROCEDURE — 99024 POSTOP FOLLOW-UP VISIT: CPT | Performed by: PHYSICIAN ASSISTANT

## 2020-01-22 NOTE — PROGRESS NOTES
[facility] [place] [facility]opa[facility] Surgery Clinic Note    Subjective     Patient: Jewels Venegas  : 1940    Primary Care Provider: Renée Multani MD    Requesting Provider: As above    Post-op (3 weeks status post total knee replacement, left 20)      History    History of Present Illness: Patient presents today 3 weeks status post left total knee arthroplasty with Dr. Weinstein on 2020.  She reports improved pain.  She is doing outpatient physical therapy.  She has pain 2/10 and is using a walker for ambulation.  She is taking aspirin for anticoagulation.    Current Outpatient Medications on File Prior to Visit   Medication Sig Dispense Refill   • acetaminophen (TYLENOL) 500 MG tablet Take 1,000 mg by mouth 2 (Two) Times a Day.     • amLODIPine (NORVASC) 5 MG tablet Take 5 mg by mouth Daily.     • aspirin 325 MG EC tablet Take 1 tablet by mouth Daily For 1 month 30 tablet 0   • atorvastatin (LIPITOR) 20 MG tablet Take 10 mg by mouth 2 (Two) Times a Week. MON AND THURS     • Dermatological Products, Misc. (EPICERAM) lotion Apply twice daily to back (Patient taking differently: Apply 1 application topically to the appropriate area as directed 2 (Two) Times a Day With Meals. Apply twice daily to back) 225 g 0   • docusate sodium (COLACE) 100 MG capsule Take 1 capsule by mouth 2 (Two) Times a Day As Needed for Constipation. 60 capsule 0   • fluticasone (FLONASE) 50 MCG/ACT nasal spray 2 sprays into each nostril As Needed for Rhinitis.     • HYDROCHLOROTHIAZIDE PO Take 12.5 mg by mouth Daily.     • Krill Oil 500 MG capsule Take 1 tablet by mouth Daily.     • levocetirizine (XYZAL) 5 MG tablet Take 5 mg by mouth Every Evening.     • levothyroxine (SYNTHROID, LEVOTHROID) 112 MCG tablet Take 112 mcg by mouth Daily.     • methocarbamol (ROBAXIN) 750 MG tablet Take 1 tablet by mouth 2 (Two) Times a Day. 60 tablet 5   • Multiple Vitamins-Minerals (MULTIVITAMIN WITH MINERALS) tablet tablet Take 1 tablet by mouth  "Daily.     • Ropivacine HCl-NaCl (NAROPIN) 20 mg/hr by Peripheral Nerve route Continuous. Indications: Acute Pain       No current facility-administered medications on file prior to visit.       Allergies   Allergen Reactions   • Betadine [Povidone Iodine] Rash     Itching, and blisters   • Adhesive Tape Rash     Plastic & cloth, can tolerate paper tape   • Codeine Itching   • Influenza Vaccines Other (See Comments) and Myalgia     fever      Past Medical History:   Diagnosis Date   • Arthritis    • Back pain    • History of kidney stones    • Hyperlipidemia     triglycerides   • Hypertension    • Osteoporosis    • Seasonal allergies    • Thyroid disease    • TIA (transient ischemic attack)     \"Possibly\"   • Urinary frequency    • Urinary urgency    • Vertigo    • Wears dentures    • Wears glasses      Past Surgical History:   Procedure Laterality Date   • APPENDECTOMY  1977   • CARDIAC CATHETERIZATION     • CHOLECYSTECTOMY     • COLONOSCOPY      COLOGARD SUMMER 2019   • LUMBAR LAMINECTOMY  06/2015    Dr. Domenico Reid, [facility] L2-5   • SPINAL CORD STIMULATOR IMPLANT N/A 10/1/2018    Procedure: SPINAL CORD STIMULATOR INSERTION PHASE 1 ;  Surgeon: Vince Olivas MD;  Location:  [facility];  Service: Neurosurgery   • TONSILLECTOMY     • TOTAL KNEE ARTHROPLASTY Right 8/20/2019    Procedure: TOTAL KNEE ARTHROPLASTY RIGHT;  Surgeon: Faustino Weinstein MD;  Location:  [facility];  Service: Orthopedics   • TOTAL KNEE ARTHROPLASTY Left 1/2/2020    Procedure: TOTAL KNEE ARTHROPLASTY LEFT;  Surgeon: Faustino Weinstein MD;  Location:  [facility];  Service: Orthopedics     Family History   Problem Relation Age of Onset   • Cancer Father    • Heart disease Father    • Breast cancer Neg Hx       Social History     Socioeconomic History   • Marital status:      Spouse name: Not on file   • Number of children: Not on file   • Years of education: Not on file   • Highest education level: Not on file   Tobacco Use "   • Smoking status: Former Smoker     Years: 20.00     Types: Cigarettes     Last attempt to quit: 1980     Years since quittin.0   • Smokeless tobacco: Never Used   • Tobacco comment: Smoking 4 (!!) PPD when patient quit    Substance and Sexual Activity   • Alcohol use: No   • Drug use: No   • Sexual activity: Defer        Review of Systems    The following portions of the patient's history were reviewed and updated as appropriate: allergies, current medications, past family history, past medical history, past social history, past surgical history and problem list.      Objective      Physical Exam  There were no vitals taken for this visit.    There is no height or weight on file to calculate BMI.    Ortho Exam  Left knee exam:  Anterior knee incision is healing well  Patient has non pruitic, erythematous rash on the knee.  ROM 0-100  Ligaments stable  NVI distally  Amulating with a walker.    Medical Decision Making    Data Review:   ordered and reviewed x-rays today    Assessment:  1. Status post total left knee replacement        Plan:  Doing well status post left total knee arthroplasty.  X-rays today show well-positioned, cemented total knee arthroplasty with no evidence of ostial lysis, subsidence of fracture.  Patient is already doing outpatient PT.  She reports improved pain.  Plan is she continue with her PT and wean herself off of the walker.  She will return to see Dr. Weinstein in 6 weeks or sooner if needed.    Patient history, diagnosis and treatment plan discussed with Dr. Weinstein.        Roberta Mckeon PA-C  20  4:01 PM

## 2020-03-16 ENCOUNTER — OFFICE VISIT (OUTPATIENT)
Dept: ORTHOPEDIC SURGERY | Facility: CLINIC | Age: 80
End: 2020-03-16

## 2020-03-16 DIAGNOSIS — Z47.89 ORTHOPEDIC AFTERCARE: ICD-10-CM

## 2020-03-16 DIAGNOSIS — Z96.652 STATUS POST TOTAL LEFT KNEE REPLACEMENT: Primary | ICD-10-CM

## 2020-03-16 PROCEDURE — 99024 POSTOP FOLLOW-UP VISIT: CPT | Performed by: ORTHOPAEDIC SURGERY

## 2020-03-16 NOTE — PROGRESS NOTES
"    [facility] Orth[facility] Surgery Clinic Note    Subjective     Chief Complaint   Patient presents with   • Post-op     8 week follow up; 10.5 weeks status post total knee replacement, left 01/02/20        HPI    It has been 2  month(s) since Ms. Venegas's last visit. She returns to clinic today for follow-up of left knee arthroplasty. She rates her pain a 1/10 on the pain scale. Previous/current treatments: cane/walker. Current symptoms: pain. The pain is worse with sleeping; resting improve the pain. Overall, she is doing better.  95% improvement compared to her preoperative symptoms.    I have reviewed the following portions of the patient's history:History of Present Illness     Patient Active Problem List   Diagnosis   • Post laminectomy syndrome   • S/P insertion of spinal cord stimulator   • Osteoporosis   • Lumbar stenosis with neurogenic claudication   • Polyneuropathy   • Spondylarthrosis   • Benign paroxysmal positional vertigo   • Insomnia due to medical condition   • At high risk for falls   • Physical deconditioning   • Encounter for fitting and adjustment of neuropacemaker of spinal cord   • Primary osteoarthritis of both knees   • Primary osteoarthritis of right knee   • Status post total left knee replacement   • HTN (hypertension)   • HLD (hyperlipidemia)   • Hypothyroid   • Leukocytosis, mild, likely reactive   • Acute postoperative pain   • Acute blood loss anemia, mild, asymptomatic    • Primary osteoarthritis of left knee   • Prediabetes     Past Medical History:   Diagnosis Date   • Arthritis    • Back pain    • History of kidney stones    • Hyperlipidemia     triglycerides   • Hypertension    • Osteoporosis    • Seasonal allergies    • Thyroid disease    • TIA (transient ischemic attack)     \"Possibly\"   • Urinary frequency    • Urinary urgency    • Vertigo    • Wears dentures    • Wears glasses       Past Surgical History:   Procedure Laterality Date   • APPENDECTOMY  1977   • CARDIAC " CATHETERIZATION     • CHOLECYSTECTOMY     • COLONOSCOPY      COLOGARD SUMMER 2019   • LUMBAR LAMINECTOMY  2015    Dr. Domenico Reid, [facility] L2-5   • SPINAL CORD STIMULATOR IMPLANT N/A 10/1/2018    Procedure: SPINAL CORD STIMULATOR INSERTION PHASE 1 ;  Surgeon: Vince Olivas MD;  Location:  [facility];  Service: Neurosurgery   • TONSILLECTOMY     • TOTAL KNEE ARTHROPLASTY Right 2019    Procedure: TOTAL KNEE ARTHROPLASTY RIGHT;  Surgeon: Faustino Weinstein MD;  Location:  [facility];  Service: Orthopedics   • TOTAL KNEE ARTHROPLASTY Left 2020    Procedure: TOTAL KNEE ARTHROPLASTY LEFT;  Surgeon: Faustino Weinstein MD;  Location:  [facility];  Service: Orthopedics      Family History   Problem Relation Age of Onset   • Cancer Father    • Heart disease Father    • Breast cancer Neg Hx      Social History     Socioeconomic History   • Marital status:      Spouse name: Not on file   • Number of children: Not on file   • Years of education: Not on file   • Highest education level: Not on file   Tobacco Use   • Smoking status: Former Smoker     Years: 20.00     Types: Cigarettes     Last attempt to quit: 1980     Years since quittin.2   • Smokeless tobacco: Never Used   • Tobacco comment: Smoking 4 (!!) PPD when patient quit    Substance and Sexual Activity   • Alcohol use: No   • Drug use: No   • Sexual activity: Defer      Current Outpatient Medications on File Prior to Visit   Medication Sig Dispense Refill   • acetaminophen (TYLENOL) 500 MG tablet Take 1,000 mg by mouth 2 (Two) Times a Day.     • amLODIPine (NORVASC) 5 MG tablet Take 5 mg by mouth Daily.     • aspirin 325 MG EC tablet Take 1 tablet by mouth Daily For 1 month 30 tablet 0   • atorvastatin (LIPITOR) 20 MG tablet Take 10 mg by mouth 2 (Two) Times a Week. MON AND THURS     • Dermatological Products, Misc. (EPICERAM) lotion Apply twice daily to back (Patient taking differently: Apply 1 application topically to the  appropriate area as directed 2 (Two) Times a Day With Meals. Apply twice daily to back) 225 g 0   • fluticasone (FLONASE) 50 MCG/ACT nasal spray 2 sprays into each nostril As Needed for Rhinitis.     • HYDROCHLOROTHIAZIDE PO Take 12.5 mg by mouth Daily.     • Krill Oil 500 MG capsule Take 1 tablet by mouth Daily.     • levocetirizine (XYZAL) 5 MG tablet Take 5 mg by mouth Every Evening.     • levothyroxine (SYNTHROID, LEVOTHROID) 112 MCG tablet Take 112 mcg by mouth Daily.     • methocarbamol (ROBAXIN) 750 MG tablet Take 1 tablet by mouth 2 (Two) Times a Day. 60 tablet 5   • Multiple Vitamins-Minerals (MULTIVITAMIN WITH MINERALS) tablet tablet Take 1 tablet by mouth Daily.     • [DISCONTINUED] docusate sodium (COLACE) 100 MG capsule Take 1 capsule by mouth 2 (Two) Times a Day As Needed for Constipation. 60 capsule 0   • [DISCONTINUED] Ropivacine HCl-NaCl (NAROPIN) 20 mg/hr by Peripheral Nerve route Continuous. Indications: Acute Pain       No current facility-administered medications on file prior to visit.       Allergies   Allergen Reactions   • Betadine [Povidone Iodine] Rash     Itching, and blisters   • Adhesive Tape Rash     Plastic & cloth, can tolerate paper tape   • Codeine Itching   • Influenza Vaccines Other (See Comments) and Myalgia     fever        Review of Systems   Constitutional: Negative.    HENT: Positive for postnasal drip.    Eyes: Negative.    Respiratory: Negative.    Cardiovascular: Negative.    Gastrointestinal: Negative.    Endocrine: Negative.    Genitourinary: Negative.    Musculoskeletal: Positive for arthralgias and back pain.   Skin: Negative.    Allergic/Immunologic: Negative.    Neurological: Negative.    Hematological: Negative.    Psychiatric/Behavioral: Negative.         Objective      Physical Exam  There were no vitals taken for this visit.    There is no height or weight on file to calculate BMI.    General:   Mental Status:  Alert   Appearance: Cooperative, in no acute  distress   Build and Nutrition: Well-nourished well-developed female   Orientation: Alert and oriented to person, place and time   Posture: Normal   Gait: Slow and cautious    Integument:   Left knee: Wound is well-healed with no signs of infection    Lower Extremities:   Left Knee:    Tenderness:  None    Effusion:  None    Swelling: None    Crepitus:  None    Range of motion:  Extension: 0°       Flexion: 130°  Instability:  No varus laxity, no valgus laxity, negative anterior drawer  Deformities:  None        Assessment and Plan     Jewels was seen today for post-op.    Diagnoses and all orders for this visit:    Status post total left knee replacement    Orthopedic aftercare        1. Status post total left knee replacement    2. Orthopedic aftercare        Reviewed my findings with the patient today.  Her left total knee arthroplasty is functioning well, and she is pleased with results.  I will see her back in 5 months, for follow-up on her right total knee arthroplasty, which will be a one-year checkup, but sooner for any problems.  I will see her back for the left knee at the one-year nicole.    Return in about 5 months (around 8/16/2020).        Faustino Weinstein MD  03/16/20  15:54

## 2020-07-06 ENCOUNTER — HOSPITAL ENCOUNTER (OUTPATIENT)
Dept: MAMMOGRAPHY | Facility: HOSPITAL | Age: 80
Discharge: HOME | End: 2020-07-06
Admitting: INTERNAL MEDICINE

## 2020-07-06 DIAGNOSIS — Z12.31 VISIT FOR SCREENING MAMMOGRAM: ICD-10-CM

## 2020-07-06 PROCEDURE — 77063 BREAST TOMOSYNTHESIS BI: CPT | Performed by: RADIOLOGY

## 2020-07-06 PROCEDURE — 77063 BREAST TOMOSYNTHESIS BI: CPT

## 2020-07-06 PROCEDURE — 77067 SCR MAMMO BI INCL CAD: CPT | Performed by: RADIOLOGY

## 2020-07-06 PROCEDURE — 77067 SCR MAMMO BI INCL CAD: CPT

## 2020-08-10 ENCOUNTER — OFFICE VISIT (OUTPATIENT)
Dept: ORTHOPEDIC SURGERY | Facility: CLINIC | Age: 80
End: 2020-08-10

## 2020-08-10 VITALS — HEIGHT: 66 IN | HEART RATE: 74 BPM | OXYGEN SATURATION: 96 % | WEIGHT: 208.4 LBS | BODY MASS INDEX: 33.49 KG/M2

## 2020-08-10 DIAGNOSIS — Z96.651 STATUS POST TOTAL RIGHT KNEE REPLACEMENT: Primary | ICD-10-CM

## 2020-08-10 DIAGNOSIS — Z09 POSTOPERATIVE EXAMINATION: ICD-10-CM

## 2020-08-10 PROCEDURE — 99213 OFFICE O/P EST LOW 20 MIN: CPT | Performed by: ORTHOPAEDIC SURGERY

## 2020-08-10 RX ORDER — QUINAPRIL HCL AND HYDROCHLOROTHIAZIDE 10; 12.5 MG/1; MG/1
TABLET ORAL
COMMUNITY
Start: 2020-06-09 | End: 2021-01-11

## 2020-08-10 RX ORDER — ATORVASTATIN CALCIUM 10 MG/1
TABLET, FILM COATED ORAL
COMMUNITY
Start: 2020-05-24

## 2020-08-10 ASSESSMENT — KOOS JR
KOOS JR SCORE: 79.914
KOOS JR SCORE: 3

## 2020-08-10 NOTE — PROGRESS NOTES
"    [facility] [place] [facility] [place]    Subjective     Chief Complaint   Patient presents with   • Follow-up     10 months follow up; 12 months s/p (R) TKA 08/20/2019        HPI    It has been 10  month(s) since Ms. Venegas's last visit. She returns to clinic today for follow-up of right knee arthroplasty. She rates her pain a 0/10 on the pain scale. Overall, she is doing better.  100% improvement compared to her preoperative symptoms.  She is pleased with results.    Of note, her left total knee arthroplasty is functioning well today also.    I have reviewed the following portions of the patient's history:History of Present Illness     Patient Active Problem List   Diagnosis   • Post laminectomy syndrome   • S/P insertion of spinal cord stimulator   • Osteoporosis   • Lumbar stenosis with neurogenic claudication   • Polyneuropathy   • Spondylarthrosis   • Benign paroxysmal positional vertigo   • Insomnia due to medical condition   • At high risk for falls   • Physical deconditioning   • Encounter for fitting and adjustment of neuropacemaker of spinal cord   • Primary osteoarthritis of both knees   • Primary osteoarthritis of right knee   • Status post total left knee replacement   • HTN (hypertension)   • HLD (hyperlipidemia)   • Hypothyroid   • Leukocytosis, mild, likely reactive   • Acute postoperative pain   • Acute blood loss anemia, mild, asymptomatic    • Primary osteoarthritis of left knee   • Prediabetes     Past Medical History:   Diagnosis Date   • Arthritis    • Back pain    • History of kidney stones    • Hyperlipidemia     triglycerides   • Hypertension    • Osteoporosis    • Seasonal allergies    • Thyroid disease    • TIA (transient ischemic attack)     \"Possibly\"   • Urinary frequency    • Urinary urgency    • Vertigo    • Wears dentures    • Wears glasses       Past Surgical History:   Procedure Laterality Date   • APPENDECTOMY  1977   • CARDIAC CATHETERIZATION     • CHOLECYSTECTOMY     • " COLONOSCOPY      COLOGARD SUMMER 2019   • LUMBAR LAMINECTOMY  2015    Dr. Domenico Reid, [facility] L2-5   • SPINAL CORD STIMULATOR IMPLANT N/A 10/1/2018    Procedure: SPINAL CORD STIMULATOR INSERTION PHASE 1 ;  Surgeon: Vince Olivas MD;  Location:  [facility];  Service: Neurosurgery   • TONSILLECTOMY     • TOTAL KNEE ARTHROPLASTY Right 2019    Procedure: TOTAL KNEE ARTHROPLASTY RIGHT;  Surgeon: Faustino Weinstein MD;  Location:  [facility];  Service: Orthopedics   • TOTAL KNEE ARTHROPLASTY Left 2020    Procedure: TOTAL KNEE ARTHROPLASTY LEFT;  Surgeon: Faustino Weinstein MD;  Location:  [facility];  Service: Orthopedics      Family History   Problem Relation Age of Onset   • Cancer Father    • Heart disease Father    • Breast cancer Neg Hx      Social History     Socioeconomic History   • Marital status:      Spouse name: Not on file   • Number of children: Not on file   • Years of education: Not on file   • Highest education level: Not on file   Tobacco Use   • Smoking status: Former Smoker     Years: 20.00     Types: Cigarettes     Last attempt to quit: 1980     Years since quittin.6   • Smokeless tobacco: Never Used   • Tobacco comment: Smoking 4 (!!) PPD when patient quit    Substance and Sexual Activity   • Alcohol use: No   • Drug use: No   • Sexual activity: Defer      Current Outpatient Medications on File Prior to Visit   Medication Sig Dispense Refill   • acetaminophen (TYLENOL) 500 MG tablet Take 1,000 mg by mouth 2 (Two) Times a Day.     • amLODIPine (NORVASC) 5 MG tablet Take 5 mg by mouth Daily.     • ASPIRIN 81 PO Take  by mouth.     • atorvastatin (LIPITOR) 10 MG tablet      • fluticasone (FLONASE) 50 MCG/ACT nasal spray 2 sprays into each nostril As Needed for Rhinitis.     • Icosapent Ethyl (VASCEPA PO) Take 1,000 mg by mouth 2 (Two) Times a Day.     • Krill Oil 500 MG capsule Take 1 tablet by mouth Daily.     • levocetirizine (XYZAL) 5 MG tablet Take 5 mg by  "mouth Every Evening.     • levothyroxine (SYNTHROID, LEVOTHROID) 112 MCG tablet Take 112 mcg by mouth Daily.     • methocarbamol (ROBAXIN) 750 MG tablet Take 1 tablet by mouth 2 (Two) Times a Day. 60 tablet 5   • Multiple Vitamins-Minerals (MULTIVITAMIN WITH MINERALS) tablet tablet Take 1 tablet by mouth Daily.     • quinapril-hydrochlorothiazide (ACCURETIC) 10-12.5 MG per tablet      • [DISCONTINUED] aspirin 325 MG EC tablet Take 1 tablet by mouth Daily For 1 month (Patient taking differently: Take 81 mg by mouth Daily.) 30 tablet 0   • Dermatological Products, Misc. (EPICERAM) lotion Apply twice daily to back (Patient taking differently: Apply 1 application topically to the appropriate area as directed 2 (Two) Times a Day With Meals. Apply twice daily to back) 225 g 0   • [DISCONTINUED] atorvastatin (LIPITOR) 20 MG tablet Take 10 mg by mouth 2 (Two) Times a Week. MON AND THURS     • [DISCONTINUED] HYDROCHLOROTHIAZIDE PO Take 12.5 mg by mouth Daily.       No current facility-administered medications on file prior to visit.       Allergies   Allergen Reactions   • Betadine [Povidone Iodine] Rash     Itching, and blisters   • Adhesive Tape Rash     Plastic & cloth, can tolerate paper tape   • Codeine Itching   • Influenza Vaccines Other (See Comments) and Myalgia     fever   • Vitamin E & [Vit E-Vit K-Safflower Oil] Hives and Rash        Review of Systems   Constitutional: Negative.    HENT: Negative.    Eyes: Negative.    Respiratory: Negative.    Cardiovascular: Negative.    Gastrointestinal: Negative.    Endocrine: Negative.    Genitourinary: Negative.    Musculoskeletal: Positive for arthralgias.   Skin: Negative.    Allergic/Immunologic: Negative.    Neurological: Negative.    Hematological: Negative.    Psychiatric/Behavioral: Negative.         Objective      Physical Exam  Pulse 74   Ht 168.9 cm (66.5\")   Wt 94.5 kg (208 lb 6.4 oz)   SpO2 96%   BMI 33.14 kg/m²     Body mass index is 33.14 " kg/m².    General:   Mental Status:  Alert   Appearance: Cooperative, in no acute distress   Build and Nutrition: Well-nourished well-developed female   Orientation: Alert and oriented to person, place and time   Posture: Normal   Gait: Walking with a cane for vertigo    Integument:   Right knee: Wound is well-healed with no signs of infection    Lower Extremities:   Right Knee:    Tenderness:  None    Effusion:  None    Swelling: None    Crepitus:  None    Range of motion:  Extension: 0°       Flexion: 130°  Instability:  No varus laxity, no valgus laxity, negative anterior drawer  Deformities:  None      Imaging/Studies  Imaging Results (Last 24 Hours)     Procedure Component Value Units Date/Time    XR Knee 3+ View With [place] Right [[id number]] Resulted:  08/10/20 1350     Updated:  08/10/20 1351    Narrative:       Right Knee Radiographs  Indication: status-post right total knee arthroplasty  Views: AP, lateral, and sunrise views of the right knee    Comparison: no change compared to prior study, 9/11/2019    Findings:   The components are well aligned, with no signs of loosening or failure.            Assessment and Plan     Jewels was seen today for follow-up.    Diagnoses and all orders for this visit:    Status post total right knee replacement  -     XR Knee 3+ View With [place] Right    Postoperative examination        1. Status post total right knee replacement    2. Postoperative examination        I reviewed my findings with the patient today.  Her right total knee arthroplasty is functioning well, and she is pleased with results.  I will see her back in 4 years for her right knee.  I will see her back for her left knee replacement in 5 months, which will be a one-year follow-up, but sooner for any problems.    Return in about 5 months (around 1/10/2021) for Recheck with X-Rays.    Medical Decision Making  Data/Risk: radiology tests and independent visualization of imaging, lab tests, or  EMG/NCV      Faustino Weinstein MD  08/10/20  13:56    Dragon disclaimer:  Much of this encounter note is an electronic transcription/translation of spoken language to printed text. The electronic translation of spoken language may permit erroneous, or at times, nonsensical words or phrases to be inadvertently transcribed; Although I have reviewed the note for such errors, some may still exist.

## 2021-01-11 ENCOUNTER — OFFICE VISIT (OUTPATIENT)
Dept: ORTHOPEDIC SURGERY | Facility: CLINIC | Age: 81
End: 2021-01-11

## 2021-01-11 VITALS — HEART RATE: 88 BPM | BODY MASS INDEX: 34.07 KG/M2 | HEIGHT: 66 IN | WEIGHT: 212 LBS | OXYGEN SATURATION: 99 %

## 2021-01-11 DIAGNOSIS — Z96.652 STATUS POST TOTAL LEFT KNEE REPLACEMENT: Primary | ICD-10-CM

## 2021-01-11 PROCEDURE — 99213 OFFICE O/P EST LOW 20 MIN: CPT | Performed by: ORTHOPAEDIC SURGERY

## 2021-01-11 RX ORDER — QUINAPRIL 20 MG/1
20 TABLET ORAL NIGHTLY
COMMUNITY

## 2021-01-11 ASSESSMENT — KOOS JR
KOOS JR SCORE: 0
KOOS JR SCORE: 100

## 2021-01-11 NOTE — PROGRESS NOTES
"    [facility] [place] [facility] Note    Subjective     Chief Complaint   Patient presents with   • Follow-up     5 month follow up; 1 year s/p status post total knee replacement, left 01/02/20        HPI    It has been 5  month(s) since Ms. Venegas's last visit. She returns to clinic today for follow-up of left knee arthroplasty. She rates her pain a 0/10 on the pain scale. Previous/current treatments: cane/walker. Current symptoms: same as prior visit. Overall, she is doing better.  100% improvement compared to her preoperative symptoms.  She uses a cane for ambulation for balance secondary to her back.    I have reviewed the following portions of the patient's history and agree with: History of Present Illness and Review of Systems    Patient Active Problem List   Diagnosis   • Post laminectomy syndrome   • S/P insertion of spinal cord stimulator   • Osteoporosis   • Lumbar stenosis with neurogenic claudication   • Polyneuropathy   • Spondylarthrosis   • Benign paroxysmal positional vertigo   • Insomnia due to medical condition   • At high risk for falls   • Physical deconditioning   • Encounter for fitting and adjustment of neuropacemaker of spinal cord   • Primary osteoarthritis of both knees   • Primary osteoarthritis of right knee   • Status post total left knee replacement   • HTN (hypertension)   • HLD (hyperlipidemia)   • Hypothyroid   • Leukocytosis, mild, likely reactive   • Acute postoperative pain   • Acute blood loss anemia, mild, asymptomatic    • Primary osteoarthritis of left knee   • Prediabetes     Past Medical History:   Diagnosis Date   • Arthritis    • Back pain    • History of kidney stones    • Hyperlipidemia     triglycerides   • Hypertension    • Osteoporosis    • Seasonal allergies    • Thyroid disease    • TIA (transient ischemic attack)     \"Possibly\"   • Urinary frequency    • Urinary urgency    • Vertigo    • Wears dentures    • Wears glasses       Past Surgical History:   Procedure " Laterality Date   • APPENDECTOMY     • CARDIAC CATHETERIZATION     • CHOLECYSTECTOMY     • COLONOSCOPY      COLOGARD SUMMER 2019   • LUMBAR LAMINECTOMY  2015    Dr. Domenico Reid, [facility] L2-5   • SPINAL CORD STIMULATOR IMPLANT N/A 10/1/2018    Procedure: SPINAL CORD STIMULATOR INSERTION PHASE 1 ;  Surgeon: Vince Olivas MD;  Location:  [facility];  Service: Neurosurgery   • TONSILLECTOMY     • TOTAL KNEE ARTHROPLASTY Right 2019    Procedure: TOTAL KNEE ARTHROPLASTY RIGHT;  Surgeon: Faustino Weinstein MD;  Location:  [facility];  Service: Orthopedics   • TOTAL KNEE ARTHROPLASTY Left 2020    Procedure: TOTAL KNEE ARTHROPLASTY LEFT;  Surgeon: Faustino Weinstein MD;  Location:  [facility];  Service: Orthopedics      Family History   Problem Relation Age of Onset   • Cancer Father    • Heart disease Father    • Breast cancer Neg Hx      Social History     Socioeconomic History   • Marital status:      Spouse name: Not on file   • Number of children: Not on file   • Years of education: Not on file   • Highest education level: Not on file   Tobacco Use   • Smoking status: Former Smoker     Years: 20.00     Types: Cigarettes     Quit date: 1980     Years since quittin.0   • Smokeless tobacco: Never Used   • Tobacco comment: Smoking 4 (!!) PPD when patient quit    Substance and Sexual Activity   • Alcohol use: No   • Drug use: No   • Sexual activity: Defer      Current Outpatient Medications on File Prior to Visit   Medication Sig Dispense Refill   • acetaminophen (TYLENOL) 500 MG tablet Take 1,000 mg by mouth 2 (Two) Times a Day.     • amLODIPine (NORVASC) 5 MG tablet Take 5 mg by mouth Daily.     • ASPIRIN 81 PO Take  by mouth.     • atorvastatin (LIPITOR) 10 MG tablet      • Dermatological Products, Misc. (EPICERAM) lotion Apply twice daily to back (Patient taking differently: Apply 1 application topically to the appropriate area as directed 2 (Two) Times a Day With Meals. Apply  "twice daily to back) 225 g 0   • fluticasone (FLONASE) 50 MCG/ACT nasal spray 2 sprays into each nostril As Needed for Rhinitis.     • Icosapent Ethyl (VASCEPA PO) Take 1,000 mg by mouth 2 (Two) Times a Day.     • Krill Oil 500 MG capsule Take 1 tablet by mouth Daily.     • levocetirizine (XYZAL) 5 MG tablet Take 5 mg by mouth Every Evening.     • levothyroxine (SYNTHROID, LEVOTHROID) 112 MCG tablet Take 112 mcg by mouth Daily.     • methocarbamol (ROBAXIN) 750 MG tablet Take 1 tablet by mouth 2 (Two) Times a Day. 60 tablet 5   • Multiple Vitamins-Minerals (MULTIVITAMIN WITH MINERALS) tablet tablet Take 1 tablet by mouth Daily.     • quinapril (ACCUPRIL) 20 MG tablet Take 20 mg by mouth Every Night.     • [DISCONTINUED] quinapril-hydrochlorothiazide (ACCURETIC) 10-12.5 MG per tablet        No current facility-administered medications on file prior to visit.       Allergies   Allergen Reactions   • Betadine [Povidone Iodine] Rash     Itching, and blisters   • Adhesive Tape Rash     Plastic & cloth, can tolerate paper tape   • Codeine Itching   • Influenza Vaccines Other (See Comments) and Myalgia     fever   • Vitamin E & [Vit E-Vit K-Safflower Oil] Hives and Rash        Review of Systems   Constitutional: Negative.    HENT: Negative.    Eyes: Negative.    Respiratory: Negative.    Cardiovascular: Negative.    Gastrointestinal: Negative.    Endocrine: Negative.    Genitourinary: Negative.    Musculoskeletal: Positive for arthralgias.   Skin: Negative.    Allergic/Immunologic: Positive for environmental allergies.   Neurological: Negative.    Hematological: Negative.    Psychiatric/Behavioral: Negative.         Objective      Physical Exam  Pulse 88   Ht 168.9 cm (66.5\")   Wt 96.2 kg (212 lb)   SpO2 99%   BMI 33.71 kg/m²     Body mass index is 33.71 kg/m².    General:   Mental Status:  Alert   Appearance: Cooperative, in no acute distress   Build and Nutrition: Well-nourished well-developed female   Orientation: " Alert and oriented to person, place and time   Posture: Normal   Gait: With a cane, secondary to balance issues from her back    Integument:   Left knee: Wound is well-healed with no signs of infection    Lower Extremities:   Left Knee:    Tenderness:  None    Effusion:  None    Swelling: None    Crepitus:  None    Range of motion:  Extension: 0°       Flexion: 120°  Instability:  No varus laxity, no valgus laxity, negative anterior drawer  Deformities:  None      Imaging/Studies  Imaging Results (Last 24 Hours)     Procedure Component Value Units Date/Time    XR Knee 3+ View With [place] Left [[id number]] Resulted: 01/11/21 1048     Updated: 01/11/21 1048    Narrative:      Left Knee Radiographs  Indication: status-post left total knee arthroplasty  Views: AP, lateral, and sunrise views of the left knee    Comparison: no change compared to prior study, 1/22/2020    Findings:   The components are well aligned, with no signs of loosening or failure.            Assessment and Plan     Diagnoses and all orders for this visit:    1. Status post total left knee replacement (Primary)  -     XR Knee 3+ View With [place] Left        1. Status post total left knee replacement        I reviewed my findings with patient today.  Her left total knee arthroplasty is functioning well, and she is pleased with results.  I will see her back in 4 years, with x-rays, which will be a 5-year checkup.  I will see her back sooner for any problems.    Return in about 4 years (around 1/11/2025) for Recheck with X-Rays.    Medical Decision Making  Data/Risk: radiology tests and independent visualization of imaging, lab tests, or EMG/NCV    Faustino Weinstein MD  01/11/21  10:53 YVETTE Valverde disclaimer:  Much of this encounter note is an electronic transcription/translation of spoken language to printed text. The electronic translation of spoken language may permit erroneous, or at times, nonsensical words or phrases to be inadvertently  transcribed; Although I have reviewed the note for such errors, some may still exist.

## 2021-02-11 ENCOUNTER — IMMUNIZATION (OUTPATIENT)
Dept: VACCINE CLINIC | Facility: HOSPITAL | Age: 81
End: 2021-02-11

## 2021-02-11 PROCEDURE — 0001A: CPT | Performed by: INTERNAL MEDICINE

## 2021-02-11 PROCEDURE — 91300 HC SARSCOV02 VAC 30MCG/0.3ML IM: CPT | Performed by: INTERNAL MEDICINE

## 2021-03-04 ENCOUNTER — IMMUNIZATION (OUTPATIENT)
Dept: VACCINE CLINIC | Facility: HOSPITAL | Age: 81
End: 2021-03-04

## 2021-03-04 PROCEDURE — 0002A: CPT | Performed by: INTERNAL MEDICINE

## 2021-03-04 PROCEDURE — 91300 HC SARSCOV02 VAC 30MCG/0.3ML IM: CPT | Performed by: INTERNAL MEDICINE

## 2021-09-13 ENCOUNTER — HOSPITAL ENCOUNTER (OUTPATIENT)
Dept: MAMMOGRAPHY | Facility: HOSPITAL | Age: 81
Discharge: HOME | End: 2021-09-13
Admitting: INTERNAL MEDICINE

## 2021-09-13 DIAGNOSIS — Z12.31 VISIT FOR SCREENING MAMMOGRAM: ICD-10-CM

## 2021-09-13 PROCEDURE — 77067 SCR MAMMO BI INCL CAD: CPT | Performed by: RADIOLOGY

## 2021-09-13 PROCEDURE — 77067 SCR MAMMO BI INCL CAD: CPT

## 2021-09-13 PROCEDURE — 77063 BREAST TOMOSYNTHESIS BI: CPT | Performed by: RADIOLOGY

## 2021-09-13 PROCEDURE — 77063 BREAST TOMOSYNTHESIS BI: CPT

## 2021-11-02 ENCOUNTER — IMMUNIZATION (OUTPATIENT)
Dept: VACCINE CLINIC | Facility: HOSPITAL | Age: 81
End: 2021-11-02

## 2021-11-02 PROCEDURE — 0004A ADM SARSCOV2 30MCG/0.3ML BOOSTER: CPT | Performed by: INTERNAL MEDICINE

## 2021-11-02 PROCEDURE — 91300 HC SARSCOV02 VAC 30MCG/0.3ML IM: CPT | Performed by: INTERNAL MEDICINE

## 2022-04-25 ENCOUNTER — TELEPHONE (OUTPATIENT)
Dept: NEUROSURGERY | Facility: CLINIC | Age: 82
End: 2022-04-25

## 2022-04-25 NOTE — TELEPHONE ENCOUNTER
Provider: NASREEN  Caller: MADHAVI ROBLERO  Relationship to Patient: SELF  Pharmacy:   Phone Number: [phone]  Reason for Call: PT CALLED STATES OVER WEEKEND NATANAEL SHI STIMULATOR HAS MOVED AND IS CAUSING NERVE PAIN.    LAST OFFICE VISIT WITH NEUROSURGERY IS 10/17/2018.    INFORMED PT THAT BEING OVER 3 YEARS SINCE SHE HAD BEEN SHE WILL HAVE T GET A NEW REFERRAL .    PT VOICED UNDERSTANDING

## 2022-05-16 NOTE — PROGRESS NOTES
"Chief Complaint: \"I feel like my stimulator has been giving me \"shocking\" sensations.\"          Brief History: Mrs. Jewels Venegas is a 81 y.o. female, who underwent implantation of a spinal cord stimulator device with Dr. Vince Olivas on 10/01/2018 with [employer]. [employer] Penta paddle lead (Full Body MRI compatible)  with the top electrodes projecting at the level of the superior endplate of the T7 vertebral level. IPG: [employer] Proclaim 7 IPG Non-Rechargeable (Full Body MRI compatible).  The device was implanted primarily for treatment of lower back and lower extremity pain. Patient returns to the clinic for evaluation of chronic pain and possible spinal cord stimulator reprogramming.  She would like to obtain more coverage in her right leg.  Patient was last seen on 06/13/2019 for SCS reprogramming.  Since she was last seen she has also underwent bilateral knee arthroplasties with Dr. Faustino Weinstein in August 2019, with right total knee arthroplasty, as well as left total knee arthroplasty in January 2020.  She reports about 3 weeks ago, she was lying down, and she felt her stimulator \"move\" associated with stinging and burning sensation, around her IPG.  She also feels that her programming was not as accurate in areas of her pain as prior.  Ms. Jewels Venegas reports 80% relief along with remarkable functional improvement with the use of her stimulator device. Patient reports significant relief of her previously severe neurogenic claudication.   Pain level is rated as 2/10 (sitting) with the stimulator \"turned on.”  Patient level ranges from 0/10 to 6/10 with the use of the spinal cord stimulator device.    Patient denies pain, numbness or weakness in the lower extremities.  Patient denies any new bladder or bowel problems.      Pain History:  Pain history: Patient reports a 6-year history of pain, which began without incident. Patient underwent multilevel laminectomies L2-L4 in 2015 with Dr. Reid " for neurogenic claudication and did exceedingly well with resolution of her symptoms until about 2016. Pain has progressed in intensity over the past 1 year.   Pain description: constant lower back pain with intermittent exacerbation, described as crushing, sharp, pressure, and stabbing sensation.   Radiation of pain: The lower back pain radiates into the lateral and anterior aspect of the thighs, the lateral aspect of the shins, and into the dorsum of her feet  Pain intensity today: 3/10 (sitting) standing or walking (6/10)  Average pain intensity last week: 4/10  Pain intensity ranges from: 1/10 to 9/10  Aggravating factors: Pain increases with standing longer than 1 minutes and ambulating with her cane or walker more than 5-10 minutes.  Alleviating factors: Pain decreases with sitting, lying down, heat and analgesics.   Associated symptoms:   Patient reports pain, numbness and weakness in the lower extremities, worse on the right side.  Patient denies any new bladder or bowel problems. Patient has a history of chronic bladder incontinence.   Patient reports difficulties with her balance but denies recent falls.     Review of previous therapies and additional medical records:  Jewels Venegas has already failed the following measures, including:   Conservative measures: oral analgesics, topical analgesics, physical therapy, ice and heat   Interventional measures: Patient underwent three Racz procedures with . She reports no long-lasting relief from those procedures.  04/03/2019: Bilateral knee joint injections with Synvisc One  Surgical measures: Patient underwent L2-L4 laminectomies and medial factectomies with  on 06/24/2015. Patient experienced 100% pain relief from her surgery for over 1 year. bilateral knee arthroplasties with Dr. Faustino Weinstein in August 2019, with right total knee arthroplasty, as well as left total knee arthroplasty in January 2020.  Jewels Venegas underwent follow-up  neurosurgical consultation with Dr. Reid on 09/26/2017, and was found not to be a surgical candidate.  Patient underwent psychological evaluation with Dr. Coral melendez and has been found to be an appropriate candidate for a spinal cord stimulation  Jewels Venegas presents with significant comorbidities including insomnia, not engaged in treatment, hypertension, osteoporosis, engaged in treatment.  In terms of current analgesics, Jewels Venegas takes: Robaxin, and Tylenol  I have reviewed Bishop Report is consistent to medication reconciliation.    Global Pain Scale  07-31  2018  08-23  2018 10-25  2018  12-13  2018  03-12  2019           Pain  14  6  4  6  6           Feelings  3  0  0  1  0           Clinical outcomes  11  1  10  3  1           Activities  13  1  1  2  2           GPS Total:  41  8  15  12  9             Diagnostic Studies: There are no new diagnostics for review relevant to her condition  EMG/NCV of both lower extremities 10/04/2017: Sensorimotor neuropathy, axonal, moderate. 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 There is a minimal dextroscoliotic curvature of the lumbar spine apex L4 with slight rotatory component at the L4-L5 juncture. Multilevel osteophyte formation with bridging anteriorly of the L2-L3, L3-L4, L4-L5 and L5-S1 levels. Facets are well aligned with facet hypertrophy noted in the lower lumbar segments. Preservation of vertebral body heights. Intervertebral disc space heights are decreased at essentially all levels but most significant at the L2-L3, L3-L4, L4-L5 and L5-S1 levels where there is disc desiccation and associated degenerative change of the surrounding endplates. Axial datasets with level by level evaluation as follows:  L1-L2: Severely limited evaluation due to poor thecal sac opacification. No gross thecal sac contour irregularity.  L2-L3: Disc osteophyte complex  with associated circumferential disc bulge with concomitant facet hypertrophy producing moderate to severe thecal sac effacement with moderate bilateral nerve root sleeve effacement.  L3-L4: Disc osteophyte complex with associated disc bulge with left lateralizing component involving the left subarticular recess and left neural foramina producing moderate thecal sac effacement with moderate to severe left nerve root sleeve effacement.  L4-L5: Disc osteophyte complex with disc bulge containing far right lateral and right paracentral components producing mild to moderate thecal sac effacement with mild left and total right nerve root sleeve effacement.  L5-S1: Disc osteophyte complex with circumferential disc bulge component producing mild thecal sac effacement with moderate right nerve root sleeve effacement.  IR MYELOGRAM LUMBAR SPINE-, XR SPINE LUMBAR FLEX AND EXT: There are postoperative changes consistent with a laminectomy at the L2-L5 levels. Moderate diffuse arthropathy of the lumbar spine with disc space narrowing, endplate disease, and osteophyte formation. There is a grade 1 retrolisthesis of L2 on L3 which measures approximate 10 mm in the flexed, and extended positions. There was good filling of the thecal sac. No intrathecal mass was identified. The nerve root sleeves appear bilaterally symmetric. There is mild anterior impression on thecal sac at the L2-L3, L3-L4, and L4-L5 levels. Please correlate with CT imaging.      MRI of the lumbar spine July 31, 2017 revealed retrolisthesis of L2 on L3. Grade 1 anterior spondylolisthesis of L5 on S1. Vertebral body height is preserved. There is multilevel disc desiccation with disc space narrowing. There is normal signal intensity in the cervical cord.  The spinal cord ends at the level of L1-L2.  Disc levels:  L1-L2: Mild annular disc bulge with no central or significant neuroforaminal stenosis  L2-L3: Broad-based disc osteophyte complex.  No central canal  "stenosis. Moderate bilateral neuroforaminal stenosis.  L3-L4: Right paracentral protrusion.  Disc material a sense along the anterior aspect of the central canal and along the posterior aspect of the inferior endplate of L3.  This is superimposed on this osteophyte complex.  There is no central stenosis. There is severe bilateral neuroforaminal stenosis.  L4-L5: Broad-based disc osteophyte complex.  There is no central canal stenosis.  Bilateral neuroforaminal stenosis.  L5-S1: Disc osteophyte complex.  There is no central canal stenosis. Moderate to severe right and mild left neuroforaminal stenosis.    The following portions of the patient's history were reviewed and updated as appropriate: problem list, past medical history, past surgery history, social history, family history, medications, and allergies    Review of Systems   Musculoskeletal: Positive for joint swelling.   All other systems reviewed and are negative.    /72 (BP Location: Left arm, Patient Position: Sitting, Cuff Size: Adult)   Pulse 88   Resp 18   Ht 168.9 cm (66.5\")   Wt 94.3 kg (208 lb)   SpO2 96%   BMI 33.07 kg/m²       Physical Exam   Neurologic Exam  Constitutional: Patient is oriented to person, place, and time. Patient appears well-developed and well-nourished.   HEENT: Head: Normocephalic and atraumatic. Eyes: Conjunctivae and lids are normal. Pupils: Equal, round, reactive to light.   Neck: Trachea normal. Neck supple. No JVD present.   Pulmonary Respiratory effort: No increased work of breathing or signs of respiratory distress.   Peripheral vascular exam: Femoral: right 2+, left 2+. Posterior tibialis: right 2+ and left 2+. Dorsalis pedis: right 2+ and left 2+. No edema.   Abdomen: The abdomen was soft and nontender. Bowel sounds were normal.   Musculoskeletal   Gait and station: Gait evaluation demonstrated shuffling   Lumbar spine: The range of motion of the lumbar spine is limited secondary to pain. Extension of the " lumbar spine increased and reproduced pain. Lumbar facet joint loading maneuvers are positive.  Hip joints: The range of motion of the hip joints is full and without pain   Neurological: Patient is alert and oriented to person, place, and time. Speech: speech is normal. Cortical function: Normal mental status.   Cranial nerves: Cranial nerves 2-12 intact.   Reflex Scores:   Right patellar: 1+   Left patellar: 1+   Right achilles: 1+   Left achilles: 1+.   Motor strength: 5/5.   Negative long tract signs. Straight leg raising test is negative. Femoral stretch sign is negative.   Sensation: No sensory loss. Sensory exam: intact to light touch, intact pain and temperature sensation, intact vibration sensation and normal proprioception.   Coordination: Normal finger to nose and heel to shin. Normal balance and negative. Romberg's sign negative.   Skin and subcutaneous tissue: Skin is warm and intact. No rash noted. No cyanosis.  The IPG is mobile within the pocket, there is no pain with mobilization.  Psychiatric: Judgment and insight: Normal. Orientation to person, place and time: Normal. Recent and remote memory: Intact. Mood and affect: Normal.     PROCEDURE: Analysis of the spinal cord stimulator device with complex spinal cord stimulator reprogramming   Patient used the [employer] spinal cord stimulator device for 72762 hours since last reprogramming, and 94353 lifetime hours (average 24 hours per day). Analysis of impedence reveals normal impedence for all contacts. The spinal cord stimulator device was reprogrammed under my direct supervision and one new program was created by adjusting electrode polarities, amplitude, pulse width,  pulse rate, BurstDR, micro-dosing, in the following fashion;    Program FOUR (Best Program):    Electrode polarities: 5+, 8+, 9-, 10+, 13+   Amplitude: 0.3-1.25 mA     Pulse width: 1000 mcs  Rate: 40 Hz  IntraBurst rate: 500 Hz  Micro-dosin-90    Patient experienced significant  "pain relief with coverage with pleasant paresthesia in all areas of chronic pain.  Time spent reprogrammin minutes  A copy of the telemetry report will be scanned in the patient's chart.    ASSESSMENT:   1. Post laminectomy syndrome    2. Presence of neurostimulator    3. Lumbar stenosis with neurogenic claudication    4. Polyneuropathy    5. At high risk for falls    6. Encounter for fitting and adjustment of neuropacemaker of spinal cord        PLAN: Patient's chronic pain condition has been significantly improved since implantation of her SCS device.  More recently, she had an incident where she felt her stimulator was \"moving\" near the battery.  The battery site is mobile within the pocket, although there is no pain with mobilization.  I am going to order x-rays to assess the spinal cord stimulator leads to ensure there is no abnormalities, although upon diagnostic of her device today there are no impedances.  I had a lengthy conversation with Ms.Jewels Venegas regarding her chronic pain condition and potential therapeutic options including risks, benefits, alternative therapies, to name a few. Patient is very satisfied with the outcome of her spinal cord stimulator device.  Therefore, I have proposed the following plan:  1. Diagnostics: Lumbar spine x-rays AP and lateral views, thoracic spine x-rays to assess SCS leads and IPG.  2. Pharmacological measures: Reviewed. Discussed.   A. Patient takes Robaxin and Tylenol.   3.  Follow-up on as-needed basis for evaluation of chronic pain and possible spinal cord stimulator reprogramming  4.  Long-term rehabilitation efforts:  A. The patient does not have a history of falls. I did complete a risk assessment for falls. Fall precautions: Patient has been instructed regarding universal fall precautions, such as;   · Using gait aids a  cane or a rolling walker at the appropriate height at all times for ambulation   · Removing all area rugs and coffee tables to create a " safe environment at home  · Ensure clean, dry floors  · Wearing supportive footwear and properly fitting clothing  · Ensure bed/chair is appropriate height and patient's feet can touch the floor  · Using a shower transfer bench  · Using walk-in shower and having shower safety bars installed  · Ensure proper lighting, minimize glare  · Have nightlights operational and in use  · Participation in an exercise program for gait training, balance training and strength  · Avoid carrying laundry up and down steps  · Ensure proper compliance and organization of medications to avoid errors   · Avoid use of over the counter sedatives and alcohol consumption  · Ensure easy access to call bell, glasses, TV control, telephone  · Ensure glasses/hearing aids are in use or close by (on top of night table)  B.  Start an exercise program such as water therapy  D.  Contrast therapy: Apply ice-packs for 15-20 minutes, followed by heating pads for 15-20 minutes to affected area   5.  The patient has been instructed to contact my office with any questions or difficulties. The patient understands the plan and agrees to proceed accordingly.    Patient Care Team:  Renée Multani MD as PCP - General  Renée Multani MD as PCP - Family Medicine  Apro, Juju Pryor PA-C as Physician Assistant (Physician Assistant)  Doron Gale MD as Consulting Physician (Pain Medicine)  Domenico Reid MD (Inactive) as Consulting Physician (Neurosurgery)     No orders of the defined types were placed in this encounter.        No future appointments.      AMITA Cerrato

## 2022-05-17 ENCOUNTER — OFFICE VISIT (OUTPATIENT)
Dept: PAIN MEDICINE | Facility: CLINIC | Age: 82
End: 2022-05-17

## 2022-05-17 VITALS
RESPIRATION RATE: 18 BRPM | HEART RATE: 88 BPM | OXYGEN SATURATION: 96 % | DIASTOLIC BLOOD PRESSURE: 72 MMHG | HEIGHT: 67 IN | SYSTOLIC BLOOD PRESSURE: 144 MMHG | WEIGHT: 208 LBS | BODY MASS INDEX: 32.65 KG/M2

## 2022-05-17 DIAGNOSIS — Z96.82 PRESENCE OF NEUROSTIMULATOR: ICD-10-CM

## 2022-05-17 DIAGNOSIS — G62.9 POLYNEUROPATHY: ICD-10-CM

## 2022-05-17 DIAGNOSIS — M48.062 LUMBAR STENOSIS WITH NEUROGENIC CLAUDICATION: ICD-10-CM

## 2022-05-17 DIAGNOSIS — Z46.2 ENCOUNTER FOR FITTING AND ADJUSTMENT OF NEUROPACEMAKER OF SPINAL CORD: ICD-10-CM

## 2022-05-17 DIAGNOSIS — M96.1 POST LAMINECTOMY SYNDROME: ICD-10-CM

## 2022-05-17 DIAGNOSIS — Z91.81 AT HIGH RISK FOR FALLS: ICD-10-CM

## 2022-05-17 PROCEDURE — 99213 OFFICE O/P EST LOW 20 MIN: CPT | Performed by: NURSE PRACTITIONER

## 2022-05-17 PROCEDURE — 95972 ALYS CPLX SP/PN NPGT W/PRGRM: CPT | Performed by: NURSE PRACTITIONER

## 2022-05-17 RX ORDER — MIRABEGRON 25 MG/1
25 TABLET, FILM COATED, EXTENDED RELEASE ORAL DAILY
COMMUNITY
Start: 2022-04-15

## 2022-05-17 RX ORDER — CETIRIZINE HYDROCHLORIDE 10 MG/1
10 TABLET ORAL DAILY
COMMUNITY

## 2022-05-17 RX ORDER — HYDROCHLOROTHIAZIDE 12.5 MG/1
12.5 TABLET ORAL AS NEEDED
COMMUNITY
Start: 2022-04-15

## 2022-05-18 ENCOUNTER — HOSPITAL ENCOUNTER (OUTPATIENT)
Dept: GENERAL RADIOLOGY | Facility: HOSPITAL | Age: 82
Discharge: HOME | End: 2022-05-18
Admitting: NURSE PRACTITIONER

## 2022-05-18 DIAGNOSIS — Z96.82 PRESENCE OF NEUROSTIMULATOR: ICD-10-CM

## 2022-05-18 PROCEDURE — 72100 X-RAY EXAM L-S SPINE 2/3 VWS: CPT

## 2022-05-18 PROCEDURE — 72072 X-RAY EXAM THORAC SPINE 3VWS: CPT | Performed by: RADIOLOGY

## 2022-05-18 PROCEDURE — 72072 X-RAY EXAM THORAC SPINE 3VWS: CPT

## 2022-05-18 PROCEDURE — 72100 X-RAY EXAM L-S SPINE 2/3 VWS: CPT | Performed by: RADIOLOGY

## 2022-06-27 ENCOUNTER — IMMUNIZATION (OUTPATIENT)
Dept: VACCINE CLINIC | Facility: HOSPITAL | Age: 82
End: 2022-06-27

## 2022-06-27 DIAGNOSIS — Z23 NEED FOR VACCINATION: Primary | ICD-10-CM

## 2022-06-27 PROCEDURE — 0054A HC ADM SARSCV2 30MCG TRS-SUCR BOOSTER: CPT | Performed by: INTERNAL MEDICINE

## 2022-06-27 PROCEDURE — 91305 HC SARSCOV2 VAC 30 MCG TRS-SUCR PFIZER: CPT | Performed by: INTERNAL MEDICINE

## 2022-09-14 ENCOUNTER — HOSPITAL ENCOUNTER (OUTPATIENT)
Dept: MAMMOGRAPHY | Facility: HOSPITAL | Age: 82
Discharge: HOME | End: 2022-09-14
Admitting: INTERNAL MEDICINE

## 2022-09-14 DIAGNOSIS — Z12.31 VISIT FOR SCREENING MAMMOGRAM: ICD-10-CM

## 2022-09-14 PROCEDURE — 77067 SCR MAMMO BI INCL CAD: CPT

## 2022-09-14 PROCEDURE — 77063 BREAST TOMOSYNTHESIS BI: CPT | Performed by: RADIOLOGY

## 2022-09-14 PROCEDURE — 77067 SCR MAMMO BI INCL CAD: CPT | Performed by: RADIOLOGY

## 2022-09-14 PROCEDURE — 77063 BREAST TOMOSYNTHESIS BI: CPT

## 2023-09-05 ENCOUNTER — TRANSCRIBE ORDERS (OUTPATIENT)
Dept: ADMINISTRATIVE | Facility: HOSPITAL | Age: 83
End: 2023-09-05
Payer: MEDICARE

## 2023-09-05 DIAGNOSIS — Z12.31 ENCOUNTER FOR SCREENING MAMMOGRAM FOR BREAST CANCER: Primary | ICD-10-CM

## 2023-09-25 ENCOUNTER — HOSPITAL ENCOUNTER (OUTPATIENT)
Dept: MAMMOGRAPHY | Facility: HOSPITAL | Age: 83
Discharge: HOME | End: 2023-09-25
Admitting: INTERNAL MEDICINE
Payer: MEDICARE

## 2023-09-25 DIAGNOSIS — Z12.31 ENCOUNTER FOR SCREENING MAMMOGRAM FOR BREAST CANCER: ICD-10-CM

## 2023-09-25 PROCEDURE — 77067 SCR MAMMO BI INCL CAD: CPT

## 2023-09-25 PROCEDURE — 77063 BREAST TOMOSYNTHESIS BI: CPT

## 2024-04-26 ENCOUNTER — OFFICE VISIT (OUTPATIENT)
Age: 84
End: 2024-04-26
Payer: MEDICARE

## 2024-04-26 ENCOUNTER — CLINICAL SUPPORT (OUTPATIENT)
Age: 84
End: 2024-04-26
Payer: MEDICARE

## 2024-04-26 VITALS
HEIGHT: 67 IN | DIASTOLIC BLOOD PRESSURE: 80 MMHG | WEIGHT: 194.3 LBS | BODY MASS INDEX: 30.49 KG/M2 | SYSTOLIC BLOOD PRESSURE: 150 MMHG

## 2024-04-26 DIAGNOSIS — M25.551 RIGHT HIP PAIN: ICD-10-CM

## 2024-04-26 DIAGNOSIS — M16.11 PRIMARY OSTEOARTHRITIS OF RIGHT HIP: ICD-10-CM

## 2024-04-26 DIAGNOSIS — M25.551 RIGHT HIP PAIN: Primary | ICD-10-CM

## 2024-04-26 DIAGNOSIS — Z96.651 HISTORY OF TOTAL KNEE REPLACEMENT, RIGHT: Primary | ICD-10-CM

## 2024-04-26 DIAGNOSIS — Z09 POSTOPERATIVE EXAMINATION: ICD-10-CM

## 2024-04-26 RX ORDER — MAGNESIUM OXIDE 400 MG/1
400 TABLET ORAL DAILY
COMMUNITY

## 2024-04-26 RX ORDER — LIDOCAINE HYDROCHLORIDE 10 MG/ML
2 INJECTION, SOLUTION EPIDURAL; INFILTRATION; INTRACAUDAL; PERINEURAL
Status: COMPLETED | OUTPATIENT
Start: 2024-04-26 | End: 2024-04-26

## 2024-04-26 RX ORDER — OLMESARTAN MEDOXOMIL 20 MG/1
1 TABLET ORAL 2 TIMES DAILY
COMMUNITY
Start: 2024-04-08

## 2024-04-26 RX ORDER — BUPIVACAINE HYDROCHLORIDE 5 MG/ML
2 INJECTION, SOLUTION EPIDURAL; INTRACAUDAL
Status: COMPLETED | OUTPATIENT
Start: 2024-04-26 | End: 2024-04-26

## 2024-04-26 RX ORDER — FENOFIBRATE 145 MG/1
145 TABLET, COATED ORAL DAILY
COMMUNITY
Start: 2024-04-24

## 2024-04-26 RX ORDER — TRIAMCINOLONE ACETONIDE 40 MG/ML
80 INJECTION, SUSPENSION INTRA-ARTICULAR; INTRAMUSCULAR
Status: COMPLETED | OUTPATIENT
Start: 2024-04-26 | End: 2024-04-26

## 2024-04-26 RX ADMIN — LIDOCAINE HYDROCHLORIDE 2 ML: 10 INJECTION, SOLUTION EPIDURAL; INFILTRATION; INTRACAUDAL; PERINEURAL at 09:08

## 2024-04-26 RX ADMIN — TRIAMCINOLONE ACETONIDE 80 MG: 40 INJECTION, SUSPENSION INTRA-ARTICULAR; INTRAMUSCULAR at 09:08

## 2024-04-26 RX ADMIN — BUPIVACAINE HYDROCHLORIDE 2 ML: 5 INJECTION, SOLUTION EPIDURAL; INTRACAUDAL at 09:08

## 2024-04-26 NOTE — PROGRESS NOTES
Procedure   - Large Joint Arthrocentesis: R hip joint on 4/26/2024 9:08 AM  Indications: pain  Details: 22 G (spinal) needle, ultrasound-guided anterolateral approach  Medications: 2 mL bupivacaine (PF) 0.5 %; 80 mg triamcinolone acetonide 40 MG/ML; 2 mL lidocaine PF 1% 1 %  Outcome: tolerated well, no immediate complications  Procedure, treatment alternatives, risks and benefits explained, specific risks discussed. Consent was given by the patient. Immediately prior to procedure a time out was called to verify the correct patient, procedure, equipment, support staff and site/side marked as required. Patient was prepped and draped in the usual sterile fashion.        83-year-old female presents with right hip pain from primary right hip osteoarthritis.  Patient is a referral from Dr. Weinstein for ultrasound-guided right hip joint corticosteroid injection.  Previous office documentation and images were personally reviewed prior to the visit.  We discussed them in detail and how they correlate to experienced symptoms.  I explained the procedure in detail.  I answered all questions to the best of my ability.  Risks and benefits as well as post procedure instructions were provided.  Patient elected to proceed and tolerated this procedure well.  See procedure note.  Follow-up with me will be on an as-needed basis.

## 2024-04-26 NOTE — PROGRESS NOTES
[facility] Note    Subjective     Chief Complaint   Patient presents with    Right Knee - Pain     4.5 years s/p (R) TKA 08/20/2019        HPI    Jewels Venegas is a 83 y.o. female who presents with new problem of: right knee pain.  Onset: atraumatic and gradual in nature. The issue has been ongoing for 2 week(s). Pain is a 3-9/10 on the pain scale. Pain is described as burning and stabbing. Associated symptoms include pain, swelling, and giving way/buckling. The pain is worse with standing and sleeping; resting and sitting improve the pain. Previous treatments have included: cane/walker.  The pain just started about 2 weeks ago.  Prior to that her right doing very well since the time of her right total knee arthroplasty.  No fevers, chills or night sweats.  Ambulatory without her external aids at home, but she is on a rolling walker today.  Some posterior hip pain.    I have reviewed the following portions of the patient's history and agree with: History of Present Illness and Review of Systems    Patient Active Problem List   Diagnosis    Post laminectomy syndrome    S/P insertion of spinal cord stimulator    Osteoporosis    Lumbar stenosis with neurogenic claudication    Polyneuropathy    Spondylarthrosis    Benign paroxysmal positional vertigo    Insomnia due to medical condition    At high risk for falls    Physical deconditioning    Encounter for fitting and adjustment of neuropacemaker of spinal cord    Primary osteoarthritis of both knees    Primary osteoarthritis of right knee    Status post total left knee replacement    HTN (hypertension)    HLD (hyperlipidemia)    Hypothyroid    Leukocytosis, mild, likely reactive    Acute postoperative pain    Acute blood loss anemia, mild, asymptomatic     Primary osteoarthritis of left knee    Prediabetes     Past Medical History:   Diagnosis Date    Arthritis     Back pain     History of kidney stones     Hyperlipidemia     triglycerides  "   Hypertension     Osteoporosis     Seasonal allergies     Thyroid disease     TIA (transient ischemic attack)     \"Possibly\"    Urinary frequency     Urinary urgency     Vertigo     Wears dentures     Wears glasses       Past Surgical History:   Procedure Laterality Date    APPENDECTOMY      CARDIAC CATHETERIZATION      CHOLECYSTECTOMY      COLONOSCOPY      COLOGARD SUMMER 2019    LUMBAR LAMINECTOMY  2015    Dr. Domenico Reid, [facility] L2-5    SPINAL CORD STIMULATOR IMPLANT N/A 10/1/2018    Procedure: SPINAL CORD STIMULATOR INSERTION PHASE 1 ;  Surgeon: Vince Olivas MD;  Location:  [facility];  Service: Neurosurgery    TONSILLECTOMY      TOTAL KNEE ARTHROPLASTY Right 2019    Procedure: TOTAL KNEE ARTHROPLASTY RIGHT;  Surgeon: Faustino Weinstein MD;  Location:  [facility];  Service: Orthopedics    TOTAL KNEE ARTHROPLASTY Left 2020    Procedure: TOTAL KNEE ARTHROPLASTY LEFT;  Surgeon: Faustino Weinstein MD;  Location:  [facility];  Service: Orthopedics      Family History   Problem Relation Age of Onset    Cancer Father     Heart disease Father     Breast cancer Neg Hx      Social History     Socioeconomic History    Marital status:    Tobacco Use    Smoking status: Former     Current packs/day: 0.00     Types: Cigarettes     Start date: 1960     Quit date: 1980     Years since quittin.3    Smokeless tobacco: Never    Tobacco comments:     Smoking 4 (!!) PPD when patient quit    Vaping Use    Vaping status: Never Used   Substance and Sexual Activity    Alcohol use: No    Drug use: No    Sexual activity: Defer      Current Outpatient Medications on File Prior to Visit   Medication Sig Dispense Refill    acetaminophen (TYLENOL) 500 MG tablet Take 2 tablets by mouth 2 (Two) Times a Day.      amLODIPine (NORVASC) 5 MG tablet Take 1 tablet by mouth Daily.      ASPIRIN 81 PO Take  by mouth.      atorvastatin (LIPITOR) 10 MG tablet       Dermatological Products, Misc. " (EPICERAM) lotion Apply twice daily to back (Patient taking differently: Apply 1 application  topically to the appropriate area as directed 2 (Two) Times a Day With Meals. Apply twice daily to back) 225 g 0    fenofibrate (TRICOR) 145 MG tablet Take 1 tablet by mouth Daily.      fluticasone (FLONASE) 50 MCG/ACT nasal spray 2 sprays into the nostril(s) as directed by provider As Needed for Rhinitis.      hydroCHLOROthiazide (HYDRODIURIL) 12.5 MG tablet Take 1 tablet by mouth As Needed.      levothyroxine (SYNTHROID, LEVOTHROID) 112 MCG tablet Take 1 tablet by mouth Daily.      LORATADINE PO Take  by mouth.      magnesium oxide (MAG-OX) 400 MG tablet Take 1 tablet by mouth Daily.      methocarbamol (ROBAXIN) 750 MG tablet Take 1 tablet by mouth 2 (Two) Times a Day. 60 tablet 5    Multiple Vitamins-Minerals (MULTIVITAMIN WITH MINERALS) tablet tablet Take 1 tablet by mouth Daily.      Myrbetriq 25 MG tablet sustained-release 24 hour 24 hr tablet Take 1 tablet by mouth Daily.      olmesartan (BENICAR) 20 MG tablet Take 1 tablet by mouth 2 (Two) Times a Day.      [DISCONTINUED] cetirizine (zyrTEC) 10 MG tablet Take 1 tablet by mouth Daily. (Patient not taking: Reported on 4/26/2024)      [DISCONTINUED] FENOFIBRATE PO Take 25 mg by mouth Daily.      [DISCONTINUED] Icosapent Ethyl (VASCEPA PO) Take 1,000 mg by mouth 2 (Two) Times a Day. (Patient not taking: Reported on 4/26/2024)      [DISCONTINUED] Krill Oil 500 MG capsule Take 1 tablet by mouth Daily. (Patient not taking: Reported on 4/26/2024)      [DISCONTINUED] levocetirizine (XYZAL) 5 MG tablet Take 5 mg by mouth Every Evening. (Patient not taking: Reported on 4/26/2024)      [DISCONTINUED] quinapril (ACCUPRIL) 20 MG tablet Take 20 mg by mouth Every Night. (Patient not taking: Reported on 4/26/2024)       No current facility-administered medications on file prior to visit.      Allergies   Allergen Reactions    Betadine [Povidone Iodine] Rash     Itching, and blisters     Adhesive Tape Rash     Plastic & cloth, can tolerate paper tape    Codeine Itching    Influenza Vaccines Other (See Comments) and Myalgia     fever    Vitamin E & [Vit E-Vit K-Safflower Oil] Hives and Rash        Review of Systems   Constitutional:  Negative for activity change, appetite change, chills, diaphoresis, fatigue, fever and unexpected weight change.   HENT:  Negative for congestion, dental problem, drooling, ear discharge, ear pain, facial swelling, hearing loss, mouth sores, nosebleeds, postnasal drip, rhinorrhea, sinus pressure, sneezing, sore throat, tinnitus, trouble swallowing and voice change.    Eyes:  Negative for photophobia, pain, discharge, redness, itching and visual disturbance.   Respiratory:  Negative for apnea, cough, choking, chest tightness, shortness of breath, wheezing and stridor.    Cardiovascular:  Negative for chest pain, palpitations and leg swelling.   Gastrointestinal:  Negative for abdominal distention, abdominal pain, anal bleeding, blood in stool, constipation, diarrhea, nausea, rectal pain and vomiting.   Endocrine: Negative for cold intolerance, heat intolerance, polydipsia, polyphagia and polyuria.   Genitourinary:  Negative for decreased urine volume, difficulty urinating, dysuria, enuresis, flank pain, frequency, genital sores, hematuria and urgency.   Musculoskeletal:  Positive for arthralgias. Negative for back pain, gait problem, joint swelling, myalgias, neck pain and neck stiffness.   Skin:  Negative for color change, pallor, rash and wound.   Allergic/Immunologic: Negative for environmental allergies, food allergies and immunocompromised state.   Neurological:  Negative for dizziness, tremors, seizures, syncope, facial asymmetry, speech difficulty, weakness, light-headedness, numbness and headaches.   Hematological:  Negative for adenopathy. Does not bruise/bleed easily.   Psychiatric/Behavioral:  Negative for agitation, behavioral problems, confusion, decreased  "concentration, dysphoric mood, hallucinations, self-injury, sleep disturbance and suicidal ideas. The patient is not nervous/anxious and is not hyperactive.         Objective      Physical Exam  /80   Ht 168.9 cm (66.5\")   Wt 88.1 kg (194 lb 4.8 oz)   BMI 30.89 kg/m²     Body mass index is 30.89 kg/m².    General:   Mental Status:  Alert   Appearance: Cooperative, in no acute distress   Build and Nutrition: Well-nourished female   Orientation: Alert and oriented to person, place and time   Posture: Normal   Gait: Nonantalgic with a rolling walker    Integument:   Right knee: Wound is well-healed with no signs of infection    Lower Extremities:   Right Knee:    Tenderness:  Mild medial/lateral joint line tenderness    Effusion:  None    Swelling: None    Crepitus:  None    Range of motion:  Extension: 0°       Flexion: 130°  Instability:  No varus laxity, no valgus laxity, negative anterior drawer  Deformities:  None  Pain in the groin with internal rotation, negative Stinchfield on the right      Imaging/Studies      Imaging Results (Last 24 Hours)       Procedure Component Value Units Date/Time    XR Hip With or Without Pelvis 2 - 3 View Right [[id number]] Resulted: 04/26/24 0858     Updated: 04/26/24 0859    Narrative:      Right Hip Radiographs  Indication: right hip pain  Views: low AP pelvis and lateral of the right hip    Comparison: no prior studies available for review    Findings:   Joint space narrowing, no acute bony abnormalities, no unusual bony   features.  Right hip arthritis.      XR Knee 3+ View With [place] Burn Right [[id number]] Resulted: 04/26/24 0835     Updated: 04/26/24 0836    Narrative:      Right Knee Radiographs  Indication: status-post right total knee arthroplasty  Views: AP, lateral, and sunrise views of the right knee    Comparison: 8/10/2020    Findings:   The components are well aligned, with no signs of loosening or failure.               Assessment and Plan     Diagnoses and all " orders for this visit:    1. History of total knee replacement, right (Primary)  -     XR Knee 3+ View With [place] Right    2. Postoperative examination    3. Right hip pain  -     XR Hip With or Without Pelvis 2 - 3 View Right        1. History of total knee replacement, right    2. Postoperative examination    3. Right hip pain        Reviewed my findings with the patient.  Her right total knee arthroplasty appears to be functioning well, and I do not believe that her knee pain is intrinsic to the knee.  She has right hip arthritis, and I believe that that is her main pain generator.  I have recommended a trial of an intra-articular hip injection, and have referred her to Dr. Shepherd for an ultrasound-guided injection.  I will see her back in 4 weeks to ensure improvement, but I will be happy to see her back sooner for any problems.    Return in about 4 weeks (around 5/24/2024) for after hip injection.      Faustino Weinstein MD  04/26/24  09:04 EDT    Dictated Utilizing Dragon Dictation

## 2024-05-29 ENCOUNTER — OFFICE VISIT (OUTPATIENT)
Age: 84
End: 2024-05-29
Payer: MEDICARE

## 2024-05-29 VITALS
HEIGHT: 66 IN | BODY MASS INDEX: 31.16 KG/M2 | SYSTOLIC BLOOD PRESSURE: 138 MMHG | WEIGHT: 193.9 LBS | DIASTOLIC BLOOD PRESSURE: 60 MMHG

## 2024-05-29 DIAGNOSIS — Z96.651 HISTORY OF TOTAL KNEE REPLACEMENT, RIGHT: Primary | ICD-10-CM

## 2024-05-29 DIAGNOSIS — M16.11 PRIMARY OSTEOARTHRITIS OF RIGHT HIP: ICD-10-CM

## 2024-05-29 NOTE — PROGRESS NOTES
[facility] Follow Up Visit     Office Follow Up      Date: 05/29/2024   Patient Name: Jewels Venegas  MRN: [id number]  YOB: 1940    Referring Physician: Faustino Weinstein MD     Chief Complaint:   Chief Complaint   Patient presents with   • Follow-up     1 month follow up- Primary osteoarthritis of right hip     History of Present Illness: Jewels Venegas is a 83 y.o. female who is here today for follow up on right hip and knee pain.  Knee pain has been completely alleviated since right hip joint corticosteroid injection at last visit.  She had 1 week of Lantus from her hip injection and then symptoms have gradually returned since that time.  Her pain is a 3/10.  Pain is primarily with standing.  As long as she is walking she does not have significant pain.  She is using a walker to ambulate.  No new injury.    Subjective   Review of Systems: Review of Systems   Constitutional:  Negative for chills, fever, unexpected weight gain and unexpected weight loss.   HENT:  Negative for congestion, postnasal drip and rhinorrhea.    Eyes:  Negative for blurred vision.   Respiratory:  Negative for shortness of breath.    Cardiovascular:  Negative for leg swelling.   Gastrointestinal:  Negative for abdominal pain, nausea and vomiting.   Genitourinary:  Negative for difficulty urinating.   Musculoskeletal:  Positive for arthralgias. Negative for gait problem, joint swelling and myalgias.   Skin:  Negative for skin lesions and wound.   Neurological:  Negative for dizziness, weakness, light-headedness and numbness.   Hematological:  Does not bruise/bleed easily.   Psychiatric/Behavioral:  Negative for depressed mood.         Medications:   Current Outpatient Medications:   •  acetaminophen (TYLENOL) 500 MG tablet, Take 2 tablets by mouth 2 (Two) Times a Day., Disp: , Rfl:   •  amLODIPine (NORVASC) 5 MG tablet, Take 1 tablet by mouth Daily., Disp: ,  Rfl:   •  ASPIRIN 81 PO, Take  by mouth., Disp: , Rfl:   •  atorvastatin (LIPITOR) 10 MG tablet, , Disp: , Rfl:   •  Dermatological Products, Misc. (EPICERAM) lotion, Apply twice daily to back (Patient taking differently: Apply 1 application  topically to the appropriate area as directed 2 (Two) Times a Day With Meals. Apply twice daily to back), Disp: 225 g, Rfl: 0  •  fenofibrate (TRICOR) 145 MG tablet, Take 1 tablet by mouth Daily., Disp: , Rfl:   •  fluticasone (FLONASE) 50 MCG/ACT nasal spray, 2 sprays into the nostril(s) as directed by provider As Needed for Rhinitis., Disp: , Rfl:   •  hydroCHLOROthiazide (HYDRODIURIL) 12.5 MG tablet, Take 1 tablet by mouth As Needed., Disp: , Rfl:   •  levothyroxine (SYNTHROID, LEVOTHROID) 112 MCG tablet, Take 1 tablet by mouth Daily., Disp: , Rfl:   •  LORATADINE PO, Take  by mouth., Disp: , Rfl:   •  magnesium oxide (MAG-OX) 400 MG tablet, Take 1 tablet by mouth Daily., Disp: , Rfl:   •  methocarbamol (ROBAXIN) 750 MG tablet, Take 1 tablet by mouth 2 (Two) Times a Day., Disp: 60 tablet, Rfl: 5  •  Multiple Vitamins-Minerals (MULTIVITAMIN WITH MINERALS) tablet tablet, Take 1 tablet by mouth Daily., Disp: , Rfl:   •  Myrbetriq 25 MG tablet sustained-release 24 hour 24 hr tablet, Take 1 tablet by mouth Daily., Disp: , Rfl:   •  olmesartan (BENICAR) 20 MG tablet, Take 1 tablet by mouth 2 (Two) Times a Day., Disp: , Rfl:     Allergies:   Allergies   Allergen Reactions   • Betadine [Povidone Iodine] Rash     Itching, and blisters   • Adhesive Tape Rash     Plastic & cloth, can tolerate paper tape   • Codeine Itching   • Influenza Vaccines Other (See Comments) and Myalgia     fever   • Vitamin E & [Vit E-Vit K-Safflower Oil] Hives and Rash       I have reviewed and updated the patient's chief complaint, history of present illness, review of systems, past medical history, surgical history, family history, social history, medications and allergy list as appropriate.     Objective   "  Vital Signs:   Vitals:    05/29/24 0837   BP: 138/60   Weight: 88 kg (193 lb 14.4 oz)   Height: 168.9 cm (66.5\")     Body mass index is 30.83 kg/m².  BMI is >= 30 and <35. (Class 1 Obesity). The following options were offered after discussion;: exercise counseling/recommendations and nutrition counseling/recommendations    Patient reports that she is a non-smoker and has not ever been a smoker.  This behavior was applauded and she was encouraged to continue in smoking cessation.  We will continue to monitor at subsequent visits.    Ortho Exam:  Gait and Station: uses a walker  Cardiovascular System: Arterial Pulses Right: dorsalis pedis pulse normal. Varicosities Right: capillary refill test normal.   Lumbar Spine: Inspection: normal alignment.   Hip/Pelvis Appearance: Inspection: normal axial alignment.   Hips: Bony Palpation Right: no tenderness of the greater trochanter. Soft Tissue Palpation Right: tenderness of the hip flexor muscles. Active Range of Motion Right: limited (secondary to pain especially flexion and rotation). Strength Right: normal 5/5.   +FADIR  Skin: Right Lower Extremity: normal. Left Lower Extremity: normal.   Neurologic: Sensation on the Right: L1 normal, L2 normal, L3 normal, L4 normal, and S2 normal. Sensation on the Left: L1 normal, L2 normal, L3 normal, L4 normal, and S2 normal.     Results Review:   Imaging Results (Last 24 Hours)       ** No results found for the last 24 hours. **            Procedures    Assessment / Plan    Assessment/Plan:   Diagnoses and all orders for this visit:    1. History of total knee replacement, right (Primary)    2. Primary osteoarthritis of right hip  -     Ambulatory Referral to Physical Therapy    Follow-up right hip and knee pain.  She is status post right total knee arthroplasty.  Knee pain complete alleviated with ultrasound-guided right hip joint corticosteroid injection.  This also greatly reduced her symptoms in the hip.  She is walking with " less pain.  Still has pain with standing.  Uses a walker.  Much improved compared to last visit.    Plan:  1.  Take advantage of hip improvement and get her into physical therapy to increase and prolong this improvement.  Referral provided today.  2.  Continue with medications for pain as previous.  3.  Follow-up with me after 7/26/2024.  Repeat ultrasound-guided right hip joint corticosteroid injection if needed at that time.    Follow Up:   Return in about 2 months (around 7/29/2024) for Recheck.      Sid Shepherd MD  [facility]

## 2024-07-29 ENCOUNTER — OFFICE VISIT (OUTPATIENT)
Age: 84
End: 2024-07-29
Payer: MEDICARE

## 2024-07-29 VITALS
HEIGHT: 66 IN | BODY MASS INDEX: 31.47 KG/M2 | WEIGHT: 195.8 LBS | SYSTOLIC BLOOD PRESSURE: 158 MMHG | DIASTOLIC BLOOD PRESSURE: 80 MMHG

## 2024-07-29 DIAGNOSIS — M16.11 PRIMARY OSTEOARTHRITIS OF RIGHT HIP: Primary | ICD-10-CM

## 2024-07-29 PROCEDURE — 3077F SYST BP >= 140 MM HG: CPT | Performed by: STUDENT IN AN ORGANIZED HEALTH CARE EDUCATION/TRAINING PROGRAM

## 2024-07-29 PROCEDURE — 1160F RVW MEDS BY RX/DR IN RCRD: CPT | Performed by: STUDENT IN AN ORGANIZED HEALTH CARE EDUCATION/TRAINING PROGRAM

## 2024-07-29 PROCEDURE — 99213 OFFICE O/P EST LOW 20 MIN: CPT | Performed by: STUDENT IN AN ORGANIZED HEALTH CARE EDUCATION/TRAINING PROGRAM

## 2024-07-29 PROCEDURE — 20611 DRAIN/INJ JOINT/BURSA W/US: CPT | Performed by: STUDENT IN AN ORGANIZED HEALTH CARE EDUCATION/TRAINING PROGRAM

## 2024-07-29 PROCEDURE — 1159F MED LIST DOCD IN RCRD: CPT | Performed by: STUDENT IN AN ORGANIZED HEALTH CARE EDUCATION/TRAINING PROGRAM

## 2024-07-29 PROCEDURE — 3079F DIAST BP 80-89 MM HG: CPT | Performed by: STUDENT IN AN ORGANIZED HEALTH CARE EDUCATION/TRAINING PROGRAM

## 2024-07-29 RX ORDER — LIDOCAINE HYDROCHLORIDE 10 MG/ML
2 INJECTION, SOLUTION EPIDURAL; INFILTRATION; INTRACAUDAL; PERINEURAL
Status: COMPLETED | OUTPATIENT
Start: 2024-07-29 | End: 2024-07-29

## 2024-07-29 RX ORDER — TRIAMCINOLONE ACETONIDE 40 MG/ML
80 INJECTION, SUSPENSION INTRA-ARTICULAR; INTRAMUSCULAR
Status: COMPLETED | OUTPATIENT
Start: 2024-07-29 | End: 2024-07-29

## 2024-07-29 RX ORDER — MELOXICAM 7.5 MG/1
7.5 TABLET ORAL
Qty: 90 TABLET | Refills: 1 | Status: SHIPPED | OUTPATIENT
Start: 2024-07-29

## 2024-07-29 RX ORDER — BUPIVACAINE HYDROCHLORIDE 5 MG/ML
2 INJECTION, SOLUTION EPIDURAL; INTRACAUDAL
Status: COMPLETED | OUTPATIENT
Start: 2024-07-29 | End: 2024-07-29

## 2024-07-29 RX ADMIN — BUPIVACAINE HYDROCHLORIDE 2 ML: 5 INJECTION, SOLUTION EPIDURAL; INTRACAUDAL at 09:47

## 2024-07-29 RX ADMIN — TRIAMCINOLONE ACETONIDE 80 MG: 40 INJECTION, SUSPENSION INTRA-ARTICULAR; INTRAMUSCULAR at 09:47

## 2024-07-29 RX ADMIN — LIDOCAINE HYDROCHLORIDE 2 ML: 10 INJECTION, SOLUTION EPIDURAL; INFILTRATION; INTRACAUDAL; PERINEURAL at 09:47

## 2024-07-29 NOTE — PROGRESS NOTES
Procedure   - Large Joint Arthrocentesis: R hip joint on 7/29/2024 9:47 AM  Indications: pain  Details: 22 G needle, ultrasound-guided anterior approach  Medications: 2 mL lidocaine PF 1% 1 %; 2 mL bupivacaine (PF) 0.5 %; 80 mg triamcinolone acetonide 40 MG/ML  Outcome: tolerated well, no immediate complications  Procedure, treatment alternatives, risks and benefits explained, specific risks discussed. Consent was given by the patient. Immediately prior to procedure a time out was called to verify the correct patient, procedure, equipment, support staff and site/side marked as required. Patient was prepped and draped in the usual sterile fashion.

## 2024-07-29 NOTE — PROGRESS NOTES
[facility] [place] [facility] Follow Up Visit     Office Follow Up      Date: 07/29/2024   Patient Name: Jewels Venegas  MRN: [id number]  YOB: 1940    Referring Physician: No ref. provider found     Chief Complaint:   Chief Complaint   Patient presents with    Follow-up     2 month f/u-- Primary osteoarthritis of right hip       History of Present Illness: Jewels Venegas is a 83 y.o. female who returns to clinic today for follow up on right hip pain. Her pain is a 5 /10 on the pain scale. Patient has tried the following previous treatments walker, physical therapy, and injections: Cortisone/Visco. She mentions current symptoms of pain , stiffness, and giving Way . She states that these treatments have worsened.      Subjective     Review of Systems: Review of Systems   Constitutional:  Negative for chills, fever, unexpected weight gain and unexpected weight loss.   HENT:  Negative for congestion, postnasal drip and rhinorrhea.    Eyes:  Negative for blurred vision.   Respiratory:  Negative for shortness of breath.    Cardiovascular:  Negative for leg swelling.   Gastrointestinal:  Negative for abdominal pain, nausea and vomiting.   Genitourinary:  Negative for difficulty urinating.   Musculoskeletal:  Positive for arthralgias. Negative for gait problem, joint swelling and myalgias.   Skin:  Negative for skin lesions and wound.   Neurological:  Negative for dizziness, weakness, light-headedness and numbness.   Hematological:  Does not bruise/bleed easily.   Psychiatric/Behavioral:  Negative for depressed mood.         Medications:   Current Outpatient Medications:     acetaminophen (TYLENOL) 500 MG tablet, Take 2 tablets by mouth 2 (Two) Times a Day., Disp: , Rfl:     amLODIPine (NORVASC) 5 MG tablet, Take 1 tablet by mouth Daily., Disp: , Rfl:     ASPIRIN 81 PO, Take  by mouth., Disp: , Rfl:     atorvastatin (LIPITOR) 10 MG tablet, , Disp: , Rfl:      Dermatological Products, Misc. (EPICERAM) lotion, Apply twice daily to back (Patient taking differently: Apply 1 application  topically to the appropriate area as directed 2 (Two) Times a Day With Meals. Apply twice daily to back), Disp: 225 g, Rfl: 0    fenofibrate (TRICOR) 145 MG tablet, Take 1 tablet by mouth Daily., Disp: , Rfl:     fluticasone (FLONASE) 50 MCG/ACT nasal spray, 2 sprays into the nostril(s) as directed by provider As Needed for Rhinitis., Disp: , Rfl:     hydroCHLOROthiazide (HYDRODIURIL) 12.5 MG tablet, Take 1 tablet by mouth As Needed., Disp: , Rfl:     levothyroxine (SYNTHROID, LEVOTHROID) 112 MCG tablet, Take 1 tablet by mouth Daily., Disp: , Rfl:     LORATADINE PO, Take  by mouth., Disp: , Rfl:     magnesium oxide (MAG-OX) 400 MG tablet, Take 1 tablet by mouth Daily., Disp: , Rfl:     meloxicam (MOBIC) 7.5 MG tablet, Take 1 tablet by mouth Daily With Breakfast., Disp: 90 tablet, Rfl: 1    methocarbamol (ROBAXIN) 750 MG tablet, Take 1 tablet by mouth 2 (Two) Times a Day., Disp: 60 tablet, Rfl: 5    Multiple Vitamins-Minerals (MULTIVITAMIN WITH MINERALS) tablet tablet, Take 1 tablet by mouth Daily., Disp: , Rfl:     Myrbetriq 25 MG tablet sustained-release 24 hour 24 hr tablet, Take 1 tablet by mouth Daily., Disp: , Rfl:     olmesartan (BENICAR) 20 MG tablet, Take 1 tablet by mouth 2 (Two) Times a Day., Disp: , Rfl:     Allergies:   Allergies   Allergen Reactions    Betadine [Povidone Iodine] Rash     Itching, and blisters    Adhesive Tape Rash     Plastic & cloth, can tolerate paper tape    Codeine Itching    Influenza Vaccines Other (See Comments) and Myalgia     fever    Vitamin E & [Vit E-Vit K-Safflower Oil] Hives and Rash       I have reviewed and updated the patient's chief complaint, history of present illness, review of systems, past medical history, surgical history, family history, social history, medications and allergy list as appropriate.     Objective      Vital Signs:   Vitals:  "   07/29/24 0931   BP: 158/80   Weight: 88.8 kg (195 lb 12.8 oz)   Height: 168.9 cm (66.5\")     Body mass index is 31.13 kg/m².  BMI is >= 30 and <35. (Class 1 Obesity). The following options were offered after discussion;: exercise counseling/recommendations and nutrition counseling/recommendations     Patient reports that she is a non-smoker and has not ever been a smoker.  This behavior was applauded and she was encouraged to continue in smoking cessation.  We will continue to monitor at subsequent visits.     Ortho Exam:  Gait and Station: uses a walker  Cardiovascular System: Arterial Pulses Right: dorsalis pedis pulse normal. Varicosities Right: capillary refill test normal.   Lumbar Spine: Inspection: normal alignment.   Hip/Pelvis Appearance: Inspection: normal axial alignment.   Hips: Bony Palpation Right: no tenderness of the greater trochanter. Soft Tissue Palpation Right: tenderness of the hip flexor muscles. Active Range of Motion Right: limited (secondary to pain especially flexion and rotation). Strength Right: normal 5/5.   +FADIR  Skin: Right Lower Extremity: normal. Left Lower Extremity: normal.   Neurologic: Sensation on the Right: L1 normal, L2 normal, L3 normal, L4 normal, and S2 normal. Sensation on the Left: L1 normal, L2 normal, L3 normal, L4 normal, and S2 normal.      Results Review:   Imaging Results (Last 24 Hours)       Procedure Component Value Units Date/Time    US Guided Injection [[id number]] Resulted: 07/29/24 0950     Updated: 07/29/24 1010            Procedures    Assessment / Plan      Assessment/Plan:   Diagnoses and all orders for this visit:    1. Primary osteoarthritis of right hip (Primary)  -     US Guided Injection  -     meloxicam (MOBIC) 7.5 MG tablet; Take 1 tablet by mouth Daily With Breakfast.  Dispense: 90 tablet; Refill: 1    Other orders  -     - Large Joint Arthrocentesis: R hip joint    Follow-up on right hip pain from primary hip osteoarthritis.  Recently " completed physical therapy with good improvement in her symptoms.  However, pain has returned especially with weightbearing activity.  She experiences a catching sensation of falling.  She did response in the past to a corticosteroid injection to the hip joint.  We repeated today.  Also started her on low-dose meloxicam to help with day-to-day pain.  I will see her back in 3 months or as symptoms dictate.    Follow Up:   No follow-ups on file.      Sid Shepherd MD  [facility]

## 2024-08-19 ENCOUNTER — OFFICE VISIT (OUTPATIENT)
Age: 84
End: 2024-08-19
Payer: MEDICARE

## 2024-08-19 VITALS
HEIGHT: 66 IN | SYSTOLIC BLOOD PRESSURE: 156 MMHG | WEIGHT: 195.77 LBS | BODY MASS INDEX: 31.46 KG/M2 | DIASTOLIC BLOOD PRESSURE: 72 MMHG

## 2024-08-19 DIAGNOSIS — M48.062 LUMBAR STENOSIS WITH NEUROGENIC CLAUDICATION: ICD-10-CM

## 2024-08-19 DIAGNOSIS — M16.11 PRIMARY OSTEOARTHRITIS OF RIGHT HIP: Primary | ICD-10-CM

## 2024-08-19 DIAGNOSIS — M51.36 DDD (DEGENERATIVE DISC DISEASE), LUMBAR: ICD-10-CM

## 2024-08-19 PROCEDURE — 3077F SYST BP >= 140 MM HG: CPT | Performed by: STUDENT IN AN ORGANIZED HEALTH CARE EDUCATION/TRAINING PROGRAM

## 2024-08-19 PROCEDURE — 3078F DIAST BP <80 MM HG: CPT | Performed by: STUDENT IN AN ORGANIZED HEALTH CARE EDUCATION/TRAINING PROGRAM

## 2024-08-19 PROCEDURE — 1159F MED LIST DOCD IN RCRD: CPT | Performed by: STUDENT IN AN ORGANIZED HEALTH CARE EDUCATION/TRAINING PROGRAM

## 2024-08-19 PROCEDURE — 1160F RVW MEDS BY RX/DR IN RCRD: CPT | Performed by: STUDENT IN AN ORGANIZED HEALTH CARE EDUCATION/TRAINING PROGRAM

## 2024-08-19 PROCEDURE — 99213 OFFICE O/P EST LOW 20 MIN: CPT | Performed by: STUDENT IN AN ORGANIZED HEALTH CARE EDUCATION/TRAINING PROGRAM

## 2024-08-19 NOTE — PROGRESS NOTES
[facility] [place] [facility] Follow Up Visit     Office Follow Up      Date: 08/19/2024   Patient Name: Jewels Venegas  MRN: [id number]  YOB: 1940    Referring Physician: Renée Multani MD     Chief Complaint:   Chief Complaint   Patient presents with    Follow-up     3 week follow up- Primary osteoarthritis of right hip       History of Present Illness: Jewels Venegas is a 84 y.o. female who returns to clinic today for follow up on right hip pain. Her pain is a 10 /10 on the pain scale. Patient has tried the following previous treatments walker , anti-inflammatories, physical therapy , and injections: Cortisone/Visco. She mentions current symptoms of same as prior . She states that these treatments have worsened.    Subjective     Review of Systems: Review of Systems   Constitutional:  Negative for chills, fever, unexpected weight gain and unexpected weight loss.   HENT:  Negative for congestion, postnasal drip and rhinorrhea.    Eyes:  Negative for blurred vision.   Respiratory:  Negative for shortness of breath.    Cardiovascular:  Negative for leg swelling.   Gastrointestinal:  Negative for abdominal pain, nausea and vomiting.   Genitourinary:  Negative for difficulty urinating.   Musculoskeletal:  Positive for arthralgias. Negative for gait problem, joint swelling and myalgias.   Skin:  Negative for skin lesions and wound.   Neurological:  Negative for dizziness, weakness, light-headedness and numbness.   Hematological:  Does not bruise/bleed easily.   Psychiatric/Behavioral:  Negative for depressed mood.         Medications:   Current Outpatient Medications:     acetaminophen (TYLENOL) 500 MG tablet, Take 2 tablets by mouth 2 (Two) Times a Day., Disp: , Rfl:     amLODIPine (NORVASC) 5 MG tablet, Take 1 tablet by mouth Daily., Disp: , Rfl:     ASPIRIN 81 PO, Take  by mouth., Disp: , Rfl:     atorvastatin (LIPITOR) 10 MG tablet, , Disp: , Rfl:      Dermatological Products, Misc. (EPICERAM) lotion, Apply twice daily to back (Patient taking differently: Apply 1 application  topically to the appropriate area as directed 2 (Two) Times a Day With Meals. Apply twice daily to back), Disp: 225 g, Rfl: 0    fenofibrate (TRICOR) 145 MG tablet, Take 1 tablet by mouth Daily., Disp: , Rfl:     fluticasone (FLONASE) 50 MCG/ACT nasal spray, 2 sprays into the nostril(s) as directed by provider As Needed for Rhinitis., Disp: , Rfl:     hydroCHLOROthiazide (HYDRODIURIL) 12.5 MG tablet, Take 1 tablet by mouth As Needed., Disp: , Rfl:     levothyroxine (SYNTHROID, LEVOTHROID) 112 MCG tablet, Take 1 tablet by mouth Daily., Disp: , Rfl:     LORATADINE PO, Take  by mouth., Disp: , Rfl:     magnesium oxide (MAG-OX) 400 MG tablet, Take 1 tablet by mouth Daily., Disp: , Rfl:     meloxicam (MOBIC) 7.5 MG tablet, Take 1 tablet by mouth Daily With Breakfast., Disp: 90 tablet, Rfl: 1    methocarbamol (ROBAXIN) 750 MG tablet, Take 1 tablet by mouth 2 (Two) Times a Day., Disp: 60 tablet, Rfl: 5    Multiple Vitamins-Minerals (MULTIVITAMIN WITH MINERALS) tablet tablet, Take 1 tablet by mouth Daily., Disp: , Rfl:     Myrbetriq 25 MG tablet sustained-release 24 hour 24 hr tablet, Take 1 tablet by mouth Daily., Disp: , Rfl:     olmesartan (BENICAR) 20 MG tablet, Take 1 tablet by mouth 2 (Two) Times a Day., Disp: , Rfl:     Allergies:   Allergies   Allergen Reactions    Betadine [Povidone Iodine] Rash     Itching, and blisters    Adhesive Tape Rash     Plastic & cloth, can tolerate paper tape    Codeine Itching    Influenza Vaccines Other (See Comments) and Myalgia     fever    Vitamin E & [Vit E-Vit K-Safflower Oil] Hives and Rash       I have reviewed and updated the patient's chief complaint, history of present illness, review of systems, past medical history, surgical history, family history, social history, medications and allergy list as appropriate.     Objective      Vital Signs:   Vitals:  "   08/19/24 1306   BP: 156/72   Weight: 88.8 kg (195 lb 12.3 oz)   Height: 168.9 cm (66.5\")     Body mass index is 31.13 kg/m².  BMI is >= 30 and <35. (Class 1 Obesity). The following options were offered after discussion;: exercise counseling/recommendations and nutrition counseling/recommendations     Patient reports that she is a non-smoker and has not ever been a smoker.  This behavior was applauded and she was encouraged to continue in smoking cessation.  We will continue to monitor at subsequent visits.     Ortho Exam:  Gait and Station: uses a walker  Cardiovascular System: Arterial Pulses Right: dorsalis pedis pulse normal. Varicosities Right: capillary refill test normal.   Lumbar Spine: TTP over midline of L spine and right SI joint. Pain with SLR. 4+/5 strength.  Hip/Pelvis Appearance: Inspection: normal axial alignment.   Hips: Bony Palpation Right: no tenderness of the greater trochanter. Soft Tissue Palpation Right: tenderness of the hip flexor muscles. Active Range of Motion Right: limited (secondary to pain especially flexion and rotation). Strength Right: normal 5/5.   +FADIR  Skin: Right Lower Extremity: normal. Left Lower Extremity: normal.   Neurologic: Sensation on the Right: L1 normal, L2 normal, L3 normal, L4 normal, and S2 normal. Sensation on the Left: L1 normal, L2 normal, L3 normal, L4 normal, and S2 normal.       Results Review:   Imaging Results (Last 24 Hours)       ** No results found for the last 24 hours. **            Procedures    Assessment / Plan      Assessment/Plan:   Diagnoses and all orders for this visit:    1. Primary osteoarthritis of right hip (Primary)    2. DDD (degenerative disc disease), lumbar    3. Lumbar stenosis with neurogenic claudication    Follow-up on right hip pain from primary hip osteoarthritis.  Symptoms were slightly improved by corticosteroid injection into the hip joint.  She is also been on meloxicam.  However, she still continues to have significant " anterior hip and groin pain.  However, her most pressing complaint is low back pain with radicular symptoms down her right leg.  Has a history of lumbar stenosis with neurogenic claudication and has had a laminectomy in the past.  Has significant degenerative disc disease also.  Recommend that she get back into see her specialist Dr. Reid or someone in his practice.  She can continue with meloxicam as needed.  I discussed with her the role for hip joint replacement but do believe that her low back would give her much more relief of symptoms given her history at this time.  I would have her receive treatment for the low back and then follow-up afterwards if she still having symptoms.    Follow Up:   Return if symptoms worsen or fail to improve.      Sdi Shepherd MD  [facility]

## 2024-08-20 ENCOUNTER — TELEPHONE (OUTPATIENT)
Dept: ORTHOPEDIC SURGERY | Facility: CLINIC | Age: 84
End: 2024-08-20
Payer: MEDICARE

## 2024-08-20 ENCOUNTER — TRANSCRIBE ORDERS (OUTPATIENT)
Dept: ADMINISTRATIVE | Facility: HOSPITAL | Age: 84
End: 2024-08-20
Payer: MEDICARE

## 2024-08-20 DIAGNOSIS — M51.36 DDD (DEGENERATIVE DISC DISEASE), LUMBAR: Primary | ICD-10-CM

## 2024-08-20 DIAGNOSIS — Z12.31 BREAST CANCER SCREENING BY MAMMOGRAM: Primary | ICD-10-CM

## 2024-08-20 DIAGNOSIS — M48.062 LUMBAR STENOSIS WITH NEUROGENIC CLAUDICATION: ICD-10-CM

## 2024-08-20 NOTE — TELEPHONE ENCOUNTER
Dr. Shepherd-please see below and advise. Thank you!    Spoke to pt and pt's daughter (on speaker phone) regarding previous message; She reports reaching out to Dr. Denton's office and they are requiring an MRI of her back before she can be re-evaluated. She is requesting Dr. Shepherd place that order and she would like her MRI completed @ [facility] in [place].    Also, requesting something to take for the pain until she is able to get in to see Dr. Denton's; Reports taking Meloxicam regularly but it is not very effective in reducing her pain; She reports having taken Tramadol in the past with little relief but wondered if gabapentin would be more beneficial if Dr. Shepherd believes her pain is nerve related.    I told her I would send this message on to Dr. Shepherd and get back with her once I had a response. She understood.    Neisha DAVISON CMA ([facility]), ROT

## 2024-08-20 NOTE — TELEPHONE ENCOUNTER
Spoke to pt's daughter who reports the pt is currently on robaxin, in addition to, the Tramadol, Meloxicam, and Tylenol Arthritis.     She mentioned that due to the severity of the patient's pain, she will probably reach out to the patient's PCP to see if they could prescribe gabapentin or a short course of narcotics such as hydrocodone until she can get into see Dr. Reid.    I told her to let us know if her PCP was not willing to prescribe either of these and then I would reach back out to Dr. Shepherd to see if he had any other recommendations. She understood.    Neisha DAVISON CMA ([facility]), ROT

## 2024-08-20 NOTE — TELEPHONE ENCOUNTER
Patients daughter called stating that she is needing an order for an MRI put in for her mother due to her having nerve issues in her back, daughter also stated that the mother will need some pain medication.    [phone] - Adilene Serrano     Please advise, thank you

## 2024-08-20 NOTE — TELEPHONE ENCOUNTER
I can order the mri to help her out but gabapentin is not a medication that I prescribe. That would be a better conversation to have with Dr. Reid. She can add tylenol arthritis to the tramadol and meloxicam.

## 2024-08-20 NOTE — TELEPHONE ENCOUNTER
Dr. Shepherd-please advise any other recommendations or if pt should contact PCP for anything stronger than Tramadol and/or gabapentin Rx. Thanks!    Spoke to pt's daughter to let her know Dr. Shepherd's message; Unfortunately she states that pt has tried the Tramadol, Tylenol Arthritis, and Meloxicam regimen and it has not provided a ton of relief; I asked if the pt has tried taking alternative anti-inflammatories such as ibuprofen or Aleve in the past to see if that worked better for her and if so, she could discontinue the Meloxicam and try one of those instead; Unfortunately she was instructed after a colonoscopy to avoid NSAIDs and therefore, she would like to try to avoid taking NSAIDs any more frequently than she already does with the once daily Meloxicam.     I told her I wasn't sure if there was anything further that Dr. Shepherd could recommend, but I would send him the message. She understood.    Neisha DAVISON CMA ([facility]), ROT

## 2024-08-20 NOTE — TELEPHONE ENCOUNTER
It looks like she was prescribed robaxin/methacarbamol at one point in the past. See if that helped her.

## 2024-08-21 DIAGNOSIS — M48.062 LUMBAR STENOSIS WITH NEUROGENIC CLAUDICATION: ICD-10-CM

## 2024-08-21 DIAGNOSIS — M51.36 DDD (DEGENERATIVE DISC DISEASE), LUMBAR: Primary | ICD-10-CM

## 2024-08-21 NOTE — TELEPHONE ENCOUNTER
Dr. Shepherd-can you place new referral for pt to see Dr. Espinoza since it has been over 3 years?    Thank you!  Neisha DAVISON CMA ([facility]), ROT

## 2024-08-21 NOTE — TELEPHONE ENCOUNTER
Spoke to pt's daughter Adilene to let her know referral has been placed. She verbalized understanding.    Neisha DAVISON CMA ([facility]), ROT

## 2024-08-21 NOTE — TELEPHONE ENCOUNTER
Caller: Adilene Serrano    Relationship: Emergency Contact    Best call back number: [phone]    What is the best time to reach you: ANY    Who are you requesting to speak with (clinical staff, provider,  specific staff member): CLINICAL    What was the call regarding: PT NEEDS REFERRAL PLACED TO SEE DR EDDY NEUROSURGEON SINCE ITS BEEN OVER 3 YRS- PLEASE CALL    Is it okay if the provider responds through MyChart: CALL

## 2024-08-26 ENCOUNTER — APPOINTMENT (OUTPATIENT)
Facility: HOSPITAL | Age: 84
End: 2024-08-26
Payer: MEDICARE

## 2024-08-26 ENCOUNTER — HOSPITAL ENCOUNTER (EMERGENCY)
Facility: HOSPITAL | Age: 84
Discharge: HOME | End: 2024-08-26
Attending: EMERGENCY MEDICINE
Payer: MEDICARE

## 2024-08-26 VITALS
DIASTOLIC BLOOD PRESSURE: 82 MMHG | BODY MASS INDEX: 31.82 KG/M2 | TEMPERATURE: 97.6 F | HEIGHT: 66 IN | WEIGHT: 198 LBS | HEART RATE: 73 BPM | SYSTOLIC BLOOD PRESSURE: 156 MMHG | RESPIRATION RATE: 18 BRPM | OXYGEN SATURATION: 100 %

## 2024-08-26 DIAGNOSIS — M53.86 SCIATICA ASSOCIATED WITH DISORDER OF LUMBAR SPINE: Primary | ICD-10-CM

## 2024-08-26 PROCEDURE — 99284 EMERGENCY DEPT VISIT MOD MDM: CPT

## 2024-08-26 PROCEDURE — 72131 CT LUMBAR SPINE W/O DYE: CPT

## 2024-08-26 RX ORDER — HYDROCODONE BITARTRATE AND ACETAMINOPHEN 5; 325 MG/1; MG/1
1 TABLET ORAL ONCE
Status: COMPLETED | OUTPATIENT
Start: 2024-08-26 | End: 2024-08-26

## 2024-08-26 RX ORDER — HYDROCODONE BITARTRATE AND ACETAMINOPHEN 5; 325 MG/1; MG/1
1 TABLET ORAL EVERY 6 HOURS PRN
Qty: 8 TABLET | Refills: 0 | Status: SHIPPED | OUTPATIENT
Start: 2024-08-26

## 2024-08-26 RX ADMIN — HYDROCODONE BITARTRATE AND ACETAMINOPHEN 1 TABLET: 5; 325 TABLET ORAL at 17:14

## 2024-08-26 NOTE — FSED PROVIDER NOTE
"Subjective   History of Present Illness  Patient presents to the emergency department for right-sided back pain.  Has a history of a bad back with multiple surgeries and sciatica however this episode has gotten significantly worse.  She denies any falls and says the pain is sharp radiating down the right leg.  Says it hurts to walk.  She denies any change in bowel or bladder habit.    History provided by:  Patient   used: No        Review of Systems   Musculoskeletal:  Positive for back pain.   All other systems reviewed and are negative.      Past Medical History:   Diagnosis Date    Arthritis     Back pain     History of kidney stones     Hyperlipidemia     triglycerides    Hypertension     Osteoporosis     Seasonal allergies     Thyroid disease     TIA (transient ischemic attack)     \"Possibly\"    Urinary frequency     Urinary urgency     Vertigo     Wears dentures     Wears glasses        Allergies   Allergen Reactions    Betadine [Povidone Iodine] Rash     Itching, and blisters    Adhesive Tape Rash     Plastic & cloth, can tolerate paper tape    Codeine Itching    Influenza Vaccines Other (See Comments) and Myalgia     fever    Vitamin E & [Vit E-Vit K-Safflower Oil] Hives and Rash       Past Surgical History:   Procedure Laterality Date    APPENDECTOMY  1977    CARDIAC CATHETERIZATION      CHOLECYSTECTOMY      COLONOSCOPY      COLOGARD SUMMER 2019    LUMBAR LAMINECTOMY  06/2015    Dr. Domenico Reid, [facility] L2-5    SPINAL CORD STIMULATOR IMPLANT N/A 10/1/2018    Procedure: SPINAL CORD STIMULATOR INSERTION PHASE 1 ;  Surgeon: Vince Olivas MD;  Location: [facility] OR;  Service: Neurosurgery    TONSILLECTOMY      TOTAL KNEE ARTHROPLASTY Right 8/20/2019    Procedure: TOTAL KNEE ARTHROPLASTY RIGHT;  Surgeon: Faustino Weinstein MD;  Location: [facility] OR;  Service: Orthopedics    TOTAL KNEE ARTHROPLASTY Left 1/2/2020    Procedure: TOTAL KNEE ARTHROPLASTY LEFT;  Surgeon: Js, " Fautsino LOVETT MD;  Location: [facility];  Service: Orthopedics       Family History   Problem Relation Age of Onset    No Known Problems Mother     Cancer Father     Heart disease Father     Breast cancer Neg Hx        Social History     Socioeconomic History    Marital status:    Tobacco Use    Smoking status: Former     Current packs/day: 0.00     Types: Cigarettes     Start date: 1960     Quit date: 1980     Years since quittin.6    Smokeless tobacco: Never    Tobacco comments:     Smoking 4 (!!) PPD when patient quit    Vaping Use    Vaping status: Never Used   Substance and Sexual Activity    Alcohol use: No    Drug use: No    Sexual activity: Defer           Objective   Physical Exam  Vitals and nursing note reviewed.   Constitutional:       Appearance: Normal appearance. She is not toxic-appearing.   HENT:      Head: Normocephalic and atraumatic.      Nose: Nose normal.      Mouth/Throat:      Mouth: Mucous membranes are moist.      Pharynx: Oropharynx is clear.   Eyes:      Extraocular Movements: Extraocular movements intact.      Pupils: Pupils are equal, round, and reactive to light.   Cardiovascular:      Rate and Rhythm: Normal rate and regular rhythm.      Pulses: Normal pulses.      Heart sounds: Normal heart sounds.   Pulmonary:      Effort: Pulmonary effort is normal. No respiratory distress.      Breath sounds: Normal breath sounds. No wheezing or rales.   Abdominal:      General: There is no distension.      Palpations: Abdomen is soft.      Tenderness: There is no abdominal tenderness. There is no guarding.   Musculoskeletal:      Cervical back: Normal, normal range of motion and neck supple.      Thoracic back: Normal.      Lumbar back: Tenderness (Right paraspinal) present. No deformity, signs of trauma or bony tenderness. Positive right straight leg raise test.   Skin:     General: Skin is warm and dry.      Capillary Refill: Capillary refill takes less than 2 seconds.    Neurological:      General: No focal deficit present.      Mental Status: She is alert and oriented to person, place, and time.      Cranial Nerves: No cranial nerve deficit.      Deep Tendon Reflexes: Reflexes normal (Patellar bilaterally).   Psychiatric:         Mood and Affect: Mood normal.         Behavior: Behavior normal.         Procedures           ED Course                                   KIKI reviewed by Alberto Sosa MD       Medical Decision Making  Hemodynamically stable and afebrile.  CT scan of the lumbar spine shows degenerative changes but no acute abnormalities.  She has no red flags for cauda equina or central cord lesion.  She does have scheduled follow-up with neurosurgery which I encouraged her to keep.  Will give short course of Norco for her acute back pain.  Given return precautions care instructions and discharged    Problems Addressed:  Sciatica associated with disorder of lumbar spine: complicated acute illness or injury    Amount and/or Complexity of Data Reviewed  External Data Reviewed: radiology and notes.  Radiology: ordered. Decision-making details documented in ED Course.    Risk  Prescription drug management.        Final diagnoses:   Sciatica associated with disorder of lumbar spine       ED Disposition  ED Disposition       ED Disposition   Discharge    Condition   Stable    Comment   --               Renée Multani MD  [address]   [facility] [id number] [phone]    Schedule an appointment as soon as possible for a visit   As needed    [facility] EM[facility]Y DEPART[facility] [place]  [address]  [facility] [address] [phone]  Go to   As needed    Vince Olivas MD  [address]  [facility] [address]  [phone]    Schedule an appointment as soon as possible for a visit            Medication List        New Prescriptions      HYDROcodone-acetaminophen 5-325 MG per tablet  Commonly known as: NORCO  Take 1  tablet by mouth Every 6 (Six) Hours As Needed for Moderate Pain.               Where to Get Your Medications        These medications were sent to [facility] [id number] - [place], [place] - [address] - [phone]  - [phone]   [address] [address]      Phone: [phone]   HYDROcodone-acetaminophen 5-325 MG per tablet

## 2024-08-30 ENCOUNTER — HOSPITAL ENCOUNTER (OUTPATIENT)
Dept: MRI IMAGING | Facility: HOSPITAL | Age: 84
Discharge: HOME | End: 2024-08-30
Payer: MEDICARE

## 2024-08-30 DIAGNOSIS — M51.36 DDD (DEGENERATIVE DISC DISEASE), LUMBAR: ICD-10-CM

## 2024-08-30 DIAGNOSIS — M48.062 LUMBAR STENOSIS WITH NEUROGENIC CLAUDICATION: ICD-10-CM

## 2024-09-13 ENCOUNTER — HOSPITAL ENCOUNTER (OUTPATIENT)
Dept: MRI IMAGING | Facility: HOSPITAL | Age: 84
Discharge: HOME | End: 2024-09-13
Payer: MEDICARE

## 2024-09-13 PROCEDURE — 72148 MRI LUMBAR SPINE W/O DYE: CPT

## 2024-09-17 ENCOUNTER — OFFICE VISIT (OUTPATIENT)
Dept: NEUROSURGERY | Facility: CLINIC | Age: 84
End: 2024-09-17
Payer: MEDICARE

## 2024-09-17 ENCOUNTER — HOSPITAL ENCOUNTER (OUTPATIENT)
Dept: GENERAL RADIOLOGY | Facility: HOSPITAL | Age: 84
Discharge: HOME | End: 2024-09-17
Payer: MEDICARE

## 2024-09-17 VITALS
BODY MASS INDEX: 31.82 KG/M2 | HEIGHT: 66 IN | SYSTOLIC BLOOD PRESSURE: 116 MMHG | TEMPERATURE: 98 F | WEIGHT: 198 LBS | DIASTOLIC BLOOD PRESSURE: 68 MMHG

## 2024-09-17 DIAGNOSIS — G89.29 CHRONIC BILATERAL LOW BACK PAIN WITH RIGHT-SIDED SCIATICA: Primary | ICD-10-CM

## 2024-09-17 DIAGNOSIS — M54.41 CHRONIC BILATERAL LOW BACK PAIN WITH RIGHT-SIDED SCIATICA: Primary | ICD-10-CM

## 2024-09-17 PROCEDURE — 3078F DIAST BP <80 MM HG: CPT

## 2024-09-17 PROCEDURE — 3074F SYST BP LT 130 MM HG: CPT

## 2024-09-17 PROCEDURE — 99204 OFFICE O/P NEW MOD 45 MIN: CPT

## 2024-09-17 PROCEDURE — 72120 X-RAY BEND ONLY L-S SPINE: CPT

## 2024-09-17 RX ORDER — TRAMADOL HYDROCHLORIDE 50 MG/1
50 TABLET ORAL
COMMUNITY
Start: 2024-08-29

## 2024-09-17 RX ORDER — PREGABALIN 75 MG/1
75 CAPSULE ORAL
COMMUNITY
Start: 2024-08-29

## 2024-09-30 ENCOUNTER — HOSPITAL ENCOUNTER (OUTPATIENT)
Dept: MAMMOGRAPHY | Facility: HOSPITAL | Age: 84
Discharge: HOME | End: 2024-09-30
Admitting: INTERNAL MEDICINE
Payer: MEDICARE

## 2024-09-30 DIAGNOSIS — Z12.31 BREAST CANCER SCREENING BY MAMMOGRAM: ICD-10-CM

## 2024-09-30 PROCEDURE — 77063 BREAST TOMOSYNTHESIS BI: CPT

## 2024-09-30 PROCEDURE — 77067 SCR MAMMO BI INCL CAD: CPT

## 2024-09-30 PROCEDURE — 77063 BREAST TOMOSYNTHESIS BI: CPT | Performed by: RADIOLOGY

## 2024-09-30 PROCEDURE — 77067 SCR MAMMO BI INCL CAD: CPT | Performed by: RADIOLOGY

## 2024-10-30 ENCOUNTER — OFFICE VISIT (OUTPATIENT)
Dept: NEUROSURGERY | Facility: CLINIC | Age: 84
End: 2024-10-30
Payer: MEDICARE

## 2024-10-30 VITALS
HEIGHT: 66 IN | DIASTOLIC BLOOD PRESSURE: 60 MMHG | WEIGHT: 198 LBS | TEMPERATURE: 97.5 F | BODY MASS INDEX: 31.82 KG/M2 | SYSTOLIC BLOOD PRESSURE: 124 MMHG

## 2024-10-30 DIAGNOSIS — Z91.81 AT HIGH RISK FOR FALLS: ICD-10-CM

## 2024-10-30 DIAGNOSIS — M96.1 POST LAMINECTOMY SYNDROME: ICD-10-CM

## 2024-10-30 DIAGNOSIS — R29.898 RIGHT LEG WEAKNESS: ICD-10-CM

## 2024-10-30 DIAGNOSIS — M54.41 CHRONIC BILATERAL LOW BACK PAIN WITH RIGHT-SIDED SCIATICA: Primary | ICD-10-CM

## 2024-10-30 DIAGNOSIS — G89.29 CHRONIC BILATERAL LOW BACK PAIN WITH RIGHT-SIDED SCIATICA: Primary | ICD-10-CM

## 2024-10-30 RX ORDER — PANTOPRAZOLE SODIUM 40 MG/1
40 TABLET, DELAYED RELEASE ORAL DAILY
COMMUNITY
Start: 2024-10-07

## 2024-10-30 RX ORDER — ZOLEDRONIC ACID 0.05 MG/ML
5 INJECTION, SOLUTION INTRAVENOUS
COMMUNITY

## 2024-10-30 NOTE — PROGRESS NOTES
"Patient: Jewels Venegas  : 1940  Chart #: [id number]    Date of Service: 10/30/2024    CHIEF COMPLAINT: Back pain with radiation into the legs    History of Present Illness Ms. Venegas is seen in follow-up.  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  She is known to our clinic.  She had laminectomies by Dr. Reid in the past.  She did well for about a year and then pain returned.  She was evaluated by Dr. Gale.  In 2018 Dr. Olivas placed [place] epidural spinal cord stimulator.  Her stimulator apparently still works well for her.  More recently she presented with increased low back pain with radiation down the right leg.  This began around  of this year.  She describes pain that extends into the lateral calf and top of the foot on the right.  She was treated with physical therapy over the summer with no improvement in symptoms.  About 6 weeks ago her leg began getting weaker.  She is now no longer independently ambulatory.  She is using a wheelchair most of the time.  No left leg weakness.  No upper extremity complaints.  No new bowel or bladder difficulties.  She was prescribed Lyrica which she is taking 75 mg 3 times a day.  This has almost eradicated the pain in her leg.  She restarted physical therapy and has made very subtle improvements in leg strength.      Past Medical History:   Diagnosis Date    Arthritis     Back pain     History of kidney stones     Hyperlipidemia     triglycerides    Hypertension     Osteoporosis     Seasonal allergies     Thyroid disease     TIA (transient ischemic attack)     \"Possibly\"    Urinary frequency     Urinary urgency     Vertigo     Wears dentures     Wears glasses          Current Outpatient Medications:     pantoprazole (PROTONIX) 40 MG EC tablet, Take 1 tablet by mouth Daily., Disp: , Rfl:     pregabalin (LYRICA) 75 MG capsule, Take 1 capsule by mouth. (Patient taking differently: Take 1 capsule by mouth 3 " (Three) Times a Day.), Disp: , Rfl:     amLODIPine (NORVASC) 5 MG tablet, Take 1 tablet by mouth Daily., Disp: , Rfl:     ASPIRIN 81 PO, Take  by mouth., Disp: , Rfl:     atorvastatin (LIPITOR) 10 MG tablet, , Disp: , Rfl:     Dermatological Products, Misc. (EPICERAM) lotion, Apply twice daily to back (Patient not taking: Reported on 9/17/2024), Disp: 225 g, Rfl: 0    fenofibrate (TRICOR) 145 MG tablet, Take 1 tablet by mouth Daily., Disp: , Rfl:     fluticasone (FLONASE) 50 MCG/ACT nasal spray, 2 sprays into the nostril(s) as directed by provider As Needed for Rhinitis., Disp: , Rfl:     hydroCHLOROthiazide (HYDRODIURIL) 12.5 MG tablet, Take 1 tablet by mouth As Needed., Disp: , Rfl:     levothyroxine (SYNTHROID, LEVOTHROID) 112 MCG tablet, Take 1 tablet by mouth Daily., Disp: , Rfl:     LORATADINE PO, Take  by mouth., Disp: , Rfl:     magnesium oxide (MAG-OX) 400 MG tablet, Take 1 tablet by mouth Daily., Disp: , Rfl:     meloxicam (MOBIC) 7.5 MG tablet, Take 1 tablet by mouth Daily With Breakfast., Disp: 90 tablet, Rfl: 1    methocarbamol (ROBAXIN) 750 MG tablet, Take 1 tablet by mouth 2 (Two) Times a Day., Disp: 60 tablet, Rfl: 5    Multiple Vitamins-Minerals (MULTIVITAMIN WITH MINERALS) tablet tablet, Take 1 tablet by mouth Daily., Disp: , Rfl:     Myrbetriq 25 MG tablet sustained-release 24 hour 24 hr tablet, Take 1 tablet by mouth Daily., Disp: , Rfl:     olmesartan (BENICAR) 20 MG tablet, Take 1 tablet by mouth 2 (Two) Times a Day., Disp: , Rfl:     traMADol (ULTRAM) 50 MG tablet, Take 1 tablet by mouth., Disp: , Rfl:     zoledronic acid (RECLAST) 5 MG/100ML solution infusion, Infuse 100 mL into a venous catheter., Disp: , Rfl:     Past Surgical History:   Procedure Laterality Date    APPENDECTOMY  1977    CARDIAC CATHETERIZATION      CHOLECYSTECTOMY      COLONOSCOPY      COLOGARD SUMMER 2019    EYE SURGERY N/A     cataract eye surgery    LUMBAR LAMINECTOMY  06/2015    Dr. Domenico Reid, [facility]  [facility] L2-5    SPINAL CORD STIMULATOR IMPLANT N/A 10/01/2018    Procedure: SPINAL CORD STIMULATOR INSERTION PHASE 1 ;  Surgeon: Vince Olivas MD;  Location:  [facility];  Service: Neurosurgery    TONSILLECTOMY      TOTAL KNEE ARTHROPLASTY Right 2019    Procedure: TOTAL KNEE ARTHROPLASTY RIGHT;  Surgeon: Faustino Weinstein MD;  Location:  [facility];  Service: Orthopedics    TOTAL KNEE ARTHROPLASTY Left 2020    Procedure: TOTAL KNEE ARTHROPLASTY LEFT;  Surgeon: Faustino Weinstein MD;  Location:  [facility];  Service: Orthopedics       Social History     Socioeconomic History    Marital status:    Tobacco Use    Smoking status: Former     Current packs/day: 0.00     Types: Cigarettes     Start date: 1960     Quit date: 1980     Years since quittin.8    Smokeless tobacco: Never    Tobacco comments:     Smoking 4 (!!) PPD when patient quit    Vaping Use    Vaping status: Never Used   Substance and Sexual Activity    Alcohol use: No    Drug use: No    Sexual activity: Defer         Review of Systems   Constitutional:  Negative for activity change, appetite change, chills, diaphoresis, fatigue, fever and unexpected weight change.   HENT:  Negative for congestion, dental problem, drooling, ear discharge, ear pain, facial swelling, hearing loss, mouth sores, nosebleeds, postnasal drip, rhinorrhea, sinus pressure, sinus pain, sneezing, sore throat, tinnitus, trouble swallowing and voice change.    Eyes:  Negative for photophobia, pain, discharge, redness, itching and visual disturbance.   Respiratory:  Negative for apnea, cough, choking, chest tightness, shortness of breath, wheezing and stridor.    Cardiovascular:  Negative for chest pain, palpitations and leg swelling.   Gastrointestinal:  Negative for abdominal distention, abdominal pain, anal bleeding, blood in stool, constipation, diarrhea, nausea, rectal pain and vomiting.   Endocrine: Negative for cold intolerance, heat intolerance,  "polydipsia, polyphagia and polyuria.   Genitourinary:  Negative for decreased urine volume, difficulty urinating, dyspareunia, dysuria, enuresis, flank pain, frequency, genital sores, hematuria, menstrual problem, pelvic pain, urgency, vaginal bleeding, vaginal discharge and vaginal pain.   Musculoskeletal:  Positive for back pain. Negative for arthralgias, gait problem, joint swelling, myalgias, neck pain and neck stiffness.   Skin:  Negative for color change, pallor, rash and wound.   Allergic/Immunologic: Negative for environmental allergies, food allergies and immunocompromised state.   Neurological:  Negative for dizziness, tremors, seizures, syncope, facial asymmetry, speech difficulty, weakness, light-headedness, numbness and headaches.   Hematological:  Negative for adenopathy. Does not bruise/bleed easily.   Psychiatric/Behavioral:  Negative for agitation, behavioral problems, confusion, decreased concentration, dysphoric mood, hallucinations, self-injury, sleep disturbance and suicidal ideas. The patient is not nervous/anxious and is not hyperactive.        Objective   Vital Signs: Blood pressure 124/60, temperature 97.5 °F (36.4 °C), temperature source Infrared, height 167.6 cm (66\"), weight 89.8 kg (198 lb), not currently breastfeeding.  Physical Exam  Vitals and nursing note reviewed.   Constitutional:       General: She is not in acute distress.     Appearance: She is well-developed.   HENT:      Head: Normocephalic and atraumatic.   Psychiatric:         Behavior: Behavior normal.         Thought Content: Thought content normal.     Musculoskeletal:     Strength in the right leg is diffusely 3/5     She is able to stand from sitting but unable to take steps even with assistance     Straight leg raising is negative.   Neurologic:     Muscle tone is normal throughout.     Coordination is intact.     Deep tendon reflexes: Diminished throughout     Slightly diminished sensation in the lower leg.     " Patient is oriented to person, place, and time.         Independent review of radiographic imaging: MRI of the lumbar spine demonstrates diffuse degenerative disc and joint disease.  There is bilateral foraminal narrowing at multiple levels, probably most pronounced at the right at L4-5.  Spinal canal is capacious throughout.  No evidence of instability.    Assessment & Plan   Diagnosis:   Lumbar radiculopathy  Right leg weakness.     Medical Decision Making: Patient's radicular complaints have improved with lyrica. She has diffuse weakness in the right leg which I cannot explain. I would like to check and MRI of the cervical and thoracic spine to investigate the weakness. Patient would like to put that on hold for now since her symptoms are stable and she is not in pain. She continues with therapy and is making some improvements.  I have asked her to keep me updated. If anything progresses, I would check additional MRIs and emg/ncv studies.         Diagnoses and all orders for this visit:    1. Chronic bilateral low back pain with right-sided sciatica (Primary)    2. At high risk for falls    3. Post laminectomy syndrome    4. Right leg weakness                  Total time of encounter: 40 minutes. This time includes face to face with patient, counseling and discussion, lab/result interpretation, coordination of follow-up care. Documents reviewed: previous note, and imaging                 Juju Brown PA-C  Patient Care Team:  Renée Multani MD as PCP - General  Renée Multani MD as PCP - Family Medicine  Juju Brown PA-C as Physician Assistant (Physician Assistant)  Doron Gale MD as Consulting Physician (Pain Medicine)  Domenico Reid MD (Inactive) as Consulting Physician (Neurosurgery)  Faustino Weinstein MD as Consulting Physician (Orthopedic Surgery)  Roberta Mckeon PA-C as Physician Assistant (Physician Assistant)  Domenico Shepherd MD as Surgeon (Orthopedic  Surgery)

## 2024-12-18 DIAGNOSIS — M16.11 PRIMARY OSTEOARTHRITIS OF RIGHT HIP: ICD-10-CM

## 2024-12-18 RX ORDER — MELOXICAM 7.5 MG/1
7.5 TABLET ORAL
Qty: 90 TABLET | Refills: 1 | OUTPATIENT
Start: 2024-12-18

## 2025-03-25 DIAGNOSIS — M16.11 PRIMARY OSTEOARTHRITIS OF RIGHT HIP: ICD-10-CM

## 2025-03-25 RX ORDER — MELOXICAM 7.5 MG/1
7.5 TABLET ORAL
Qty: 90 TABLET | Refills: 1 | Status: SHIPPED | OUTPATIENT
Start: 2025-03-25

## 2025-08-22 ENCOUNTER — TRANSCRIBE ORDERS (OUTPATIENT)
Dept: ADMINISTRATIVE | Facility: HOSPITAL | Age: 85
End: 2025-08-22
Payer: MEDICARE

## 2025-08-22 DIAGNOSIS — Z12.31 VISIT FOR SCREENING MAMMOGRAM: Primary | ICD-10-CM

## (undated) DEVICE — IRRIGATOR BULB ASEPTO 60CC STRL

## (undated) DEVICE — CEMENT MIXING SYSTEM WITH MIS FEMORAL BREAKAWAY NOZZLE: Brand: REVOLUTION

## (undated) DEVICE — PUMP PAIN AUTOFUSER AUTO SELCT NOBOLUS 1TO14ML/HR 550ML DISP

## (undated) DEVICE — NDL HYPO ECLPS SFTY 18G 1 1/2IN

## (undated) DEVICE — ADHS LIQ MASTISOL 2/3ML

## (undated) DEVICE — GLV SURG PREMIERPRO MIC LTX PF SZ8.5 BRN

## (undated) DEVICE — SYR LUERLOK 20CC BX/50

## (undated) DEVICE — TOOL 14MH30D LEGEND 14CM 3MM MH DIAM: Brand: MIDAS REX ™

## (undated) DEVICE — ELECTRD BLD EXT EDGE/INSUL 1P 4IN

## (undated) DEVICE — ANTIBACTERIAL UNDYED BRAIDED (POLYGLACTIN 910), SYNTHETIC ABSORBABLE SUTURE: Brand: COATED VICRYL

## (undated) DEVICE — STRYKER PERFORMANCE SERIES SAGITTAL BLADE: Brand: STRYKER PERFORMANCE SERIES

## (undated) DEVICE — PK NEURO DISC 10

## (undated) DEVICE — Device

## (undated) DEVICE — GLV SURG SENSICARE W/ALOE PF LF 9 STRL

## (undated) DEVICE — CODMAN® DISPOSABLE CATHETER PASSER: Brand: CODMAN®

## (undated) DEVICE — GENESIS TROCHLEAR PIN 1/8 X 3: Brand: GENESIS

## (undated) DEVICE — SYS SKIN CLS DERMABOND PRINEO W/22CM MESH TP

## (undated) DEVICE — SUT SILK 2/0 SH CR8 18IN CR8 C012D

## (undated) DEVICE — UNDERCAST PADDING: Brand: DEROYAL

## (undated) DEVICE — SUT VIC 3/0 PS2 27IN J427H

## (undated) DEVICE — SPNG GZ WOVN 4X4IN 12PLY 10/BX STRL

## (undated) DEVICE — BNDG ELAS W/CLIP 6IN 10YD LF STRL

## (undated) DEVICE — SUT SILK 2/0 TIES 18IN A185H

## (undated) DEVICE — CVR HNDL LT SURG ACCSSRY BLU STRL

## (undated) DEVICE — DRSNG WND BORDR/ADHS NONADHR/GZ LF 4X4IN STRL

## (undated) DEVICE — PK KN TOTL 10

## (undated) DEVICE — ACCY PA700 LUBRICANT DIFFUSER MR7 4 PACK: Brand: MIDAS REX

## (undated) DEVICE — SOL LR 1000ML

## (undated) DEVICE — MEDI-VAC NON-CONDUCTIVE SUCTION TUBING: Brand: CARDINAL HEALTH

## (undated) DEVICE — BRAIN SPATULA, 7 7/8", (200 MM), DOUBLE ENDED, MALLEABLE, WIDTH: 10 MM, SILICONE, STERILE, DISPOSABLE, PACKAGE OF 10 PIECES: Brand: AESCULAP

## (undated) DEVICE — APPL CHLORAPREP W/TINT 26ML BLU

## (undated) DEVICE — MEDI-VAC YANKAUER SUCTION HANDLE W/BULBOUS TIP: Brand: CARDINAL HEALTH

## (undated) DEVICE — SUT VIC PLS CTD ANTIB BR 3/0 8/18IN 45CM

## (undated) DEVICE — ENCORE® LATEX MICRO SIZE 7.5, STERILE LATEX POWDER-FREE SURGICAL GLOVE: Brand: ENCORE

## (undated) DEVICE — DRSNG PAD ABD 8X10IN STRL

## (undated) DEVICE — ENCORE® LATEX MICRO SIZE 7, STERILE LATEX POWDER-FREE SURGICAL GLOVE: Brand: ENCORE

## (undated) DEVICE — HDRST INTUB GENTLETOUCH SLOT 7IN RT

## (undated) DEVICE — ENCORE® LATEX MICRO SIZE 6.5, STERILE LATEX POWDER-FREE SURGICAL GLOVE: Brand: ENCORE

## (undated) DEVICE — 3M™ STERI-STRIP™ REINFORCED ADHESIVE SKIN CLOSURES, R1547, 1/2 IN X 4 IN (12 MM X 100 MM), 6 STRIPS/ENVELOPE: Brand: 3M™ STERI-STRIP™

## (undated) DEVICE — 3M™ STERI-DRAPE™ INCISE DRAPE 1050 (60CM X 45CM): Brand: STERI-DRAPE™